# Patient Record
Sex: FEMALE | Race: WHITE | NOT HISPANIC OR LATINO | Employment: OTHER | URBAN - METROPOLITAN AREA
[De-identification: names, ages, dates, MRNs, and addresses within clinical notes are randomized per-mention and may not be internally consistent; named-entity substitution may affect disease eponyms.]

---

## 2017-09-29 ENCOUNTER — HISTORICAL (OUTPATIENT)
Dept: ADMINISTRATIVE | Facility: HOSPITAL | Age: 62
End: 2017-09-29

## 2018-02-19 ENCOUNTER — HISTORICAL (OUTPATIENT)
Dept: ADMINISTRATIVE | Facility: HOSPITAL | Age: 63
End: 2018-02-19

## 2018-04-09 ENCOUNTER — HISTORICAL (OUTPATIENT)
Dept: ADMINISTRATIVE | Facility: HOSPITAL | Age: 63
End: 2018-04-09

## 2018-10-08 ENCOUNTER — HISTORICAL (OUTPATIENT)
Dept: ADMINISTRATIVE | Facility: HOSPITAL | Age: 63
End: 2018-10-08

## 2019-05-01 ENCOUNTER — HISTORICAL (OUTPATIENT)
Dept: ADMINISTRATIVE | Facility: HOSPITAL | Age: 64
End: 2019-05-01

## 2019-05-06 ENCOUNTER — HISTORICAL (OUTPATIENT)
Dept: ADMINISTRATIVE | Facility: HOSPITAL | Age: 64
End: 2019-05-06

## 2019-08-05 LAB — BMD RECOMMENDATION EXT: NORMAL

## 2019-09-26 ENCOUNTER — HOSPITAL ENCOUNTER (OUTPATIENT)
Dept: MEDSURG UNIT | Facility: HOSPITAL | Age: 64
End: 2019-09-27
Attending: SURGERY | Admitting: SURGERY

## 2019-09-26 LAB
ABS NEUT (OLG): 11.84 X10(3)/MCL (ref 2.1–9.2)
ALBUMIN SERPL-MCNC: 3.6 GM/DL (ref 3.4–5)
ALBUMIN/GLOB SERPL: 1.3 RATIO (ref 1.1–2)
ALP SERPL-CCNC: 65 UNIT/L (ref 38–126)
ALT SERPL-CCNC: 15 UNIT/L (ref 12–78)
APPEARANCE, UA: CLEAR
AST SERPL-CCNC: 14 UNIT/L (ref 15–37)
BACTERIA SPEC CULT: NORMAL /HPF
BASOPHILS # BLD AUTO: 0.1 X10(3)/MCL (ref 0–0.2)
BASOPHILS NFR BLD AUTO: 0 %
BILIRUB SERPL-MCNC: 0.3 MG/DL (ref 0.2–1)
BILIRUB UR QL STRIP: NEGATIVE
BILIRUBIN DIRECT+TOT PNL SERPL-MCNC: 0.1 MG/DL (ref 0–0.5)
BILIRUBIN DIRECT+TOT PNL SERPL-MCNC: 0.2 MG/DL (ref 0–0.8)
BUN SERPL-MCNC: 18 MG/DL (ref 7–18)
CALCIUM SERPL-MCNC: 8.5 MG/DL (ref 8.5–10.1)
CHLORIDE SERPL-SCNC: 106 MMOL/L (ref 98–107)
CO2 SERPL-SCNC: 27 MMOL/L (ref 21–32)
COLOR UR: YELLOW
CREAT SERPL-MCNC: 0.89 MG/DL (ref 0.55–1.02)
EOSINOPHIL # BLD AUTO: 0 X10(3)/MCL (ref 0–0.9)
EOSINOPHIL NFR BLD AUTO: 0 %
ERYTHROCYTE [DISTWIDTH] IN BLOOD BY AUTOMATED COUNT: 12.9 % (ref 11.5–17)
GLOBULIN SER-MCNC: 2.7 GM/DL (ref 2.4–3.5)
GLUCOSE (UA): NEGATIVE
GLUCOSE SERPL-MCNC: 118 MG/DL (ref 74–106)
HCT VFR BLD AUTO: 41.1 % (ref 37–47)
HGB BLD-MCNC: 13.2 GM/DL (ref 12–16)
HGB UR QL STRIP: NEGATIVE
KETONES UR QL STRIP: NEGATIVE
LEUKOCYTE ESTERASE UR QL STRIP: NEGATIVE
LIPASE SERPL-CCNC: 113 UNIT/L (ref 73–393)
LYMPHOCYTES # BLD AUTO: 1 X10(3)/MCL (ref 0.6–4.6)
LYMPHOCYTES NFR BLD AUTO: 7 %
MCH RBC QN AUTO: 28.9 PG (ref 27–31)
MCHC RBC AUTO-ENTMCNC: 32.1 GM/DL (ref 33–36)
MCV RBC AUTO: 89.9 FL (ref 80–94)
MONOCYTES # BLD AUTO: 0.7 X10(3)/MCL (ref 0.1–1.3)
MONOCYTES NFR BLD AUTO: 5 %
NEUTROPHILS # BLD AUTO: 11.84 X10(3)/MCL (ref 2.1–9.2)
NEUTROPHILS NFR BLD AUTO: 87 %
NITRITE UR QL STRIP: NEGATIVE
PH UR STRIP: 6.5 [PH] (ref 5–9)
PLATELET # BLD AUTO: 189 X10(3)/MCL (ref 130–400)
PMV BLD AUTO: 9.4 FL (ref 9.4–12.4)
POTASSIUM SERPL-SCNC: 4.6 MMOL/L (ref 3.5–5.1)
PROT SERPL-MCNC: 6.3 GM/DL (ref 6.4–8.2)
PROT UR QL STRIP: NEGATIVE
RBC # BLD AUTO: 4.57 X10(6)/MCL (ref 4.2–5.4)
RBC #/AREA URNS HPF: NORMAL /[HPF]
SODIUM SERPL-SCNC: 142 MMOL/L (ref 136–145)
SP GR UR STRIP: 1.02 (ref 1–1.03)
SQUAMOUS EPITHELIAL, UA: NORMAL
TROPONIN I SERPL-MCNC: <0.02 NG/ML (ref 0.02–0.49)
UROBILINOGEN UR STRIP-ACNC: 1
WBC # SPEC AUTO: 13.7 X10(3)/MCL (ref 4.5–11.5)
WBC #/AREA URNS HPF: NORMAL /[HPF]

## 2019-10-07 ENCOUNTER — HISTORICAL (OUTPATIENT)
Dept: ADMINISTRATIVE | Facility: HOSPITAL | Age: 64
End: 2019-10-07

## 2019-10-07 LAB
ABS NEUT (OLG): 2.96 X10(3)/MCL (ref 2.1–9.2)
BASOPHILS # BLD AUTO: 0.1 X10(3)/MCL (ref 0–0.2)
BASOPHILS NFR BLD AUTO: 1 %
EOSINOPHIL # BLD AUTO: 0.2 X10(3)/MCL (ref 0–0.9)
EOSINOPHIL NFR BLD AUTO: 3 %
ERYTHROCYTE [DISTWIDTH] IN BLOOD BY AUTOMATED COUNT: 12.9 % (ref 11.5–17)
HCT VFR BLD AUTO: 41.5 % (ref 37–47)
HGB BLD-MCNC: 13.3 GM/DL (ref 12–16)
LYMPHOCYTES # BLD AUTO: 2 X10(3)/MCL (ref 0.6–4.6)
LYMPHOCYTES NFR BLD AUTO: 35 %
MCH RBC QN AUTO: 28.9 PG (ref 27–31)
MCHC RBC AUTO-ENTMCNC: 32 GM/DL (ref 33–36)
MCV RBC AUTO: 90.2 FL (ref 80–94)
MONOCYTES # BLD AUTO: 0.5 X10(3)/MCL (ref 0.1–1.3)
MONOCYTES NFR BLD AUTO: 9 %
NEUTROPHILS # BLD AUTO: 2.96 X10(3)/MCL (ref 2.1–9.2)
NEUTROPHILS NFR BLD AUTO: 52 %
PLATELET # BLD AUTO: 231 X10(3)/MCL (ref 130–400)
PMV BLD AUTO: 9.3 FL (ref 9.4–12.4)
RBC # BLD AUTO: 4.6 X10(6)/MCL (ref 4.2–5.4)
WBC # SPEC AUTO: 5.7 X10(3)/MCL (ref 4.5–11.5)

## 2019-12-17 ENCOUNTER — HISTORICAL (OUTPATIENT)
Dept: ADMINISTRATIVE | Facility: HOSPITAL | Age: 64
End: 2019-12-17

## 2019-12-18 ENCOUNTER — HISTORICAL (OUTPATIENT)
Dept: ADMINISTRATIVE | Facility: HOSPITAL | Age: 64
End: 2019-12-18

## 2020-03-03 ENCOUNTER — DOCUMENTATION ONLY (OUTPATIENT)
Dept: GYNECOLOGIC ONCOLOGY | Facility: CLINIC | Age: 65
End: 2020-03-03

## 2020-03-03 NOTE — PROGRESS NOTES
Referring physician: Roger Baker MD  Reason for referral: persistent LSIL vaginal pap    March 2014:  Total laparoscopic hysterectomy with bilateral salpingo oophorectomy.  Pathology showed cervix negative for dysplasia.  Inactive endometrium.  Normal tubes and ovaries.  Uterine fibroid.    August 2017:  Vaginal Pap:  LSI L. Positive high risk HPV.    February 2018:  Vaginal Pap:  ASCUS.  Negative high-risk HPV.    August 2018:  Vaginal Pap:  Normal.    September 2019:  Vaginal Pap:  LSIL.  Negative high-risk HPV.    February 2020:  Vaginal Pap:  LSIL.  Negative high-risk HPV.    Patient referred for further management of abnormal vaginal cytology.

## 2020-03-04 ENCOUNTER — TELEPHONE (OUTPATIENT)
Dept: ADMINISTRATIVE | Facility: OTHER | Age: 65
End: 2020-03-04

## 2020-03-04 RX ORDER — PREDNISONE 20 MG/1
TABLET ORAL
COMMUNITY
Start: 2019-12-23 | End: 2020-12-29

## 2020-03-04 RX ORDER — OSPEMIFENE 60 MG/1
TABLET, FILM COATED ORAL
COMMUNITY
Start: 2020-02-20 | End: 2020-12-29

## 2020-03-04 RX ORDER — CYANOCOBALAMIN 1000 UG/ML
INJECTION, SOLUTION INTRAMUSCULAR; SUBCUTANEOUS
COMMUNITY
Start: 2019-12-23 | End: 2020-12-29

## 2020-03-04 RX ORDER — VORTIOXETINE 20 MG/1
TABLET, FILM COATED ORAL
COMMUNITY
Start: 2020-02-16 | End: 2022-10-27

## 2020-03-04 RX ORDER — LUBIPROSTONE 24 UG/1
CAPSULE, GELATIN COATED ORAL
COMMUNITY
Start: 2020-02-27 | End: 2022-05-13

## 2020-03-04 RX ORDER — ZALEPLON 10 MG/1
CAPSULE ORAL
COMMUNITY
Start: 2019-12-18 | End: 2022-05-13

## 2020-03-04 RX ORDER — ESTROGENS, CONJUGATED 0.45 MG/1
TABLET, FILM COATED ORAL DAILY
COMMUNITY
Start: 2020-02-18

## 2020-03-04 RX ORDER — ESOMEPRAZOLE MAGNESIUM 40 MG/1
CAPSULE, DELAYED RELEASE ORAL
COMMUNITY
Start: 2020-02-16 | End: 2022-10-27

## 2020-03-04 RX ORDER — LEVOTHYROXINE SODIUM 50 UG/1
TABLET ORAL
COMMUNITY
Start: 2019-12-16 | End: 2022-08-09

## 2020-03-04 RX ORDER — TIZANIDINE 4 MG/1
TABLET ORAL
COMMUNITY
Start: 2019-12-23 | End: 2020-12-29

## 2020-03-04 RX ORDER — BENZONATATE 200 MG/1
CAPSULE ORAL
COMMUNITY
Start: 2019-12-23 | End: 2020-12-29

## 2020-03-04 RX ORDER — AZITHROMYCIN 250 MG/1
TABLET, FILM COATED ORAL
COMMUNITY
Start: 2019-12-18 | End: 2020-12-29

## 2020-03-04 RX ORDER — ALBUTEROL SULFATE 90 UG/1
AEROSOL, METERED RESPIRATORY (INHALATION)
COMMUNITY
Start: 2019-12-13 | End: 2020-12-29

## 2020-03-11 ENCOUNTER — TELEPHONE (OUTPATIENT)
Dept: ADMINISTRATIVE | Facility: OTHER | Age: 65
End: 2020-03-11

## 2020-03-12 ENCOUNTER — INITIAL CONSULT (OUTPATIENT)
Dept: GYNECOLOGIC ONCOLOGY | Facility: CLINIC | Age: 65
End: 2020-03-12
Payer: COMMERCIAL

## 2020-03-12 VITALS
WEIGHT: 192.88 LBS | DIASTOLIC BLOOD PRESSURE: 65 MMHG | BODY MASS INDEX: 32.93 KG/M2 | SYSTOLIC BLOOD PRESSURE: 136 MMHG | HEIGHT: 64 IN | HEART RATE: 75 BPM

## 2020-03-12 DIAGNOSIS — R87.622 LOW GRADE SQUAMOUS INTRAEPITH LESION ON CYTOLOGIC SMEAR VAGINA (LGSIL): ICD-10-CM

## 2020-03-12 DIAGNOSIS — N95.2 VAGINAL ATROPHY: ICD-10-CM

## 2020-03-12 PROCEDURE — 57420 COLPOSCOPY: ICD-10-PCS | Mod: S$GLB,,, | Performed by: OBSTETRICS & GYNECOLOGY

## 2020-03-12 PROCEDURE — 3008F PR BODY MASS INDEX (BMI) DOCUMENTED: ICD-10-PCS | Mod: CPTII,S$GLB,, | Performed by: OBSTETRICS & GYNECOLOGY

## 2020-03-12 PROCEDURE — 99204 OFFICE O/P NEW MOD 45 MIN: CPT | Mod: S$GLB,,, | Performed by: OBSTETRICS & GYNECOLOGY

## 2020-03-12 PROCEDURE — 99999 PR PBB SHADOW E&M-EST. PATIENT-LVL III: ICD-10-PCS | Mod: PBBFAC,,, | Performed by: OBSTETRICS & GYNECOLOGY

## 2020-03-12 PROCEDURE — 57420 EXAM OF VAGINA W/SCOPE: CPT | Mod: S$GLB,,, | Performed by: OBSTETRICS & GYNECOLOGY

## 2020-03-12 PROCEDURE — 3008F BODY MASS INDEX DOCD: CPT | Mod: CPTII,S$GLB,, | Performed by: OBSTETRICS & GYNECOLOGY

## 2020-03-12 PROCEDURE — 99999 PR PBB SHADOW E&M-EST. PATIENT-LVL III: CPT | Mod: PBBFAC,,, | Performed by: OBSTETRICS & GYNECOLOGY

## 2020-03-12 PROCEDURE — 99204 PR OFFICE/OUTPT VISIT, NEW, LEVL IV, 45-59 MIN: ICD-10-PCS | Mod: S$GLB,,, | Performed by: OBSTETRICS & GYNECOLOGY

## 2020-03-12 RX ORDER — ESTRADIOL 10 UG/1
10 INSERT VAGINAL
Qty: 24 TABLET | Refills: 3 | Status: SHIPPED | OUTPATIENT
Start: 2020-03-12 | End: 2020-12-29 | Stop reason: SDUPTHER

## 2020-03-12 RX ORDER — ACETAMINOPHEN 500 MG
5000 TABLET ORAL DAILY
COMMUNITY
End: 2023-09-18

## 2020-03-12 RX ORDER — NAPROXEN SODIUM 220 MG/1
81 TABLET, FILM COATED ORAL DAILY
COMMUNITY
End: 2023-09-18

## 2020-03-12 NOTE — LETTER
March 12, 2020      Roger Baker MD  [address] [place] [address]  [place] [address]           [facility] - GYN Oncology  [address]  [place] [address]  Phone: [phone]          Patient: Beronica Olguin   MR Number: [id number]   YOB: 1955   Date of Visit: 3/12/2020       Dear Dr. Roger Baker:    Thank you for referring Beronica Olguin to me for evaluation. Attached you will find relevant portions of my assessment and plan of care.    If you have questions, please do not hesitate to call me. I look forward to following Beronica Olguin along with you.    Sincerely,    Eris Garcia MD    Enclosure  CC:  No Recipients    If you would like to receive this communication electronically, please contact [email or web address] or [phone] to request more information on [facility] [employer] [facility] access.    For providers and/or their staff who would like to refer a patient to [facility], please contact us through our one-stop-shop provider referral line, [facility] , at [phone].    If you feel you have received this communication in error or would no longer like to receive these types of communications, please e-mail [email or web address]

## 2020-03-12 NOTE — PROGRESS NOTES
"Subjective:       Patient ID: Beronica Olguin is a 64 y.o. female.    Chief Complaint: Advice Only and Abnormal Pap Smear    HPI     Referring physician: Roger Baker MD  Reason for referral: persistent LSIL vaginal pap     March 2014:  Total laparoscopic hysterectomy with bilateral salpingo oophorectomy.  Pathology showed cervix negative for dysplasia.  Inactive endometrium.  Normal tubes and ovaries.  Uterine fibroid.     August 2017:  Vaginal Pap:  LSIL. Positive high risk HPV. No treatment was prescribed. No colposcopy was done.      February 2018:  Vaginal Pap:  ASCUS.  Negative high-risk HPV.     August 2018:  Vaginal Pap:  Normal.     September 2019:  Vaginal Pap:  LSIL.  Negative high-risk HPV.     February 2020:  Vaginal Pap:  LSIL.  Negative high-risk HPV.     Patient referred for further management of abnormal vaginal cytology.    Not sexually active at present.   Denies vaginal pain or discomfort. Several years has a "bout" of where the vaginal felt irritated.     Review of Systems   Constitutional: Negative for chills, fatigue and fever.   Respiratory: Negative for cough, shortness of breath and wheezing.    Cardiovascular: Negative for chest pain, palpitations and leg swelling.   Gastrointestinal: Positive for constipation and diarrhea. Negative for abdominal pain, nausea and vomiting.        IBS   Genitourinary: Positive for urgency. Negative for difficulty urinating, dysuria, frequency, genital sores, hematuria, vaginal bleeding, vaginal discharge and vaginal pain.   Musculoskeletal: Positive for back pain.   Neurological: Negative for weakness.   Hematological: Negative for adenopathy. Does not bruise/bleed easily.   Psychiatric/Behavioral: The patient is nervous/anxious.        Objective:   /65   Pulse 75   Ht 5' 4" (1.626 m)   Wt 87.5 kg (192 lb 14.4 oz)   BMI 33.11 kg/m²      Physical Exam   Constitutional: She is oriented to person, place, and time. She appears well-developed and " well-nourished.   HENT:   Head: Normocephalic and atraumatic.   Eyes: No scleral icterus.   Neck: Neck supple. No tracheal deviation present. No thyroid mass and no thyromegaly present.   Cardiovascular: Normal rate and regular rhythm.   Pulmonary/Chest: Effort normal and breath sounds normal. She has no wheezes.   Abdominal: Soft. She exhibits no distension and no mass. There is no hepatosplenomegaly. There is no tenderness. There is no rebound and no guarding.   Genitourinary:   Genitourinary Comments: Bimanual exam:  Vulva: no lesions. Normal appearance  Urethra: Normal size and location. No lesions  Bladder: No masses or tenderness.  Vagina: normal mucosa. No lesion  Cervix: absent.   Uterus: absent.  Adnexa: no masses.  Rectovaginal: Not performed     Musculoskeletal: She exhibits no edema or tenderness.   Lymphadenopathy:     She has no cervical adenopathy.     She has no axillary adenopathy.        Right: No inguinal and no supraclavicular adenopathy present.        Left: No inguinal and no supraclavicular adenopathy present.   Neurological: She is alert and oriented to person, place, and time.   Skin: Skin is warm and dry.   Psychiatric: She has a normal mood and affect. Her behavior is normal. Judgment and thought content normal.       Assessment:       1. Low grade squamous intraepith lesion on cytologic smear vagina (lgsil)    2. Vaginal atrophy        Plan:   Low grade squamous intraepith lesion on cytologic smear vagina (lgsil)  -     estradioL (VAGIFEM) 10 mcg Tab; Place 1 tablet (10 mcg total) vaginally twice a week.  Dispense: 24 tablet; Refill: 3  -     Colposcopy    Vaginal atrophy  -     estradioL (VAGIFEM) 10 mcg Tab; Place 1 tablet (10 mcg total) vaginally twice a week.  Dispense: 24 tablet; Refill: 3      I discussed the patient that I believe the abnormal vaginal Pap is due to atrophic changes.  I reassured her that I did not think  there was a serious problem in terms of dysplasia or cancer  of the vagina.  This helped to relieve her anxiety.    Plan will be for 3 months of vaginal estrogen and then she will return to see me for a follow-up Pap.  If that Pap is normal then she can return to Dr. Baker

## 2020-03-18 NOTE — PROCEDURES
Colposcopy  Date/Time: 3/12/2020 2:00 PM  Performed by: Eris Garcia MD  Authorized by: Eris Garcia MD     Consent Done?:  Yes (Written)    Colposcopy Site:  Vagina  Position:  Supine  Acrowhite Lesion: No    Atypical Vessels: No    Biopsy?: No     Patient tolerated the procedure well with no immediate complications.   Post-operative instructions were provided for the patient.   Patient was discharged and will follow up if any complications occur     Atrophic changes were present in the vagina.  No lesions were seen.

## 2020-06-24 ENCOUNTER — OFFICE VISIT (OUTPATIENT)
Dept: GYNECOLOGIC ONCOLOGY | Facility: CLINIC | Age: 65
End: 2020-06-24
Payer: COMMERCIAL

## 2020-06-24 VITALS
HEART RATE: 76 BPM | SYSTOLIC BLOOD PRESSURE: 134 MMHG | DIASTOLIC BLOOD PRESSURE: 63 MMHG | WEIGHT: 192.25 LBS | BODY MASS INDEX: 33 KG/M2

## 2020-06-24 DIAGNOSIS — B00.9 HERPES: ICD-10-CM

## 2020-06-24 DIAGNOSIS — R87.622 LOW GRADE SQUAMOUS INTRAEPITH LESION ON CYTOLOGIC SMEAR VAGINA (LGSIL): Primary | ICD-10-CM

## 2020-06-24 PROCEDURE — 99213 PR OFFICE/OUTPT VISIT, EST, LEVL III, 20-29 MIN: ICD-10-PCS | Mod: S$GLB,,, | Performed by: OBSTETRICS & GYNECOLOGY

## 2020-06-24 PROCEDURE — 99999 PR PBB SHADOW E&M-EST. PATIENT-LVL III: CPT | Mod: PBBFAC,,, | Performed by: OBSTETRICS & GYNECOLOGY

## 2020-06-24 PROCEDURE — 88141 PR  CYTOPATH CERV/VAG INTERPRET: ICD-10-PCS | Mod: ,,, | Performed by: PATHOLOGY

## 2020-06-24 PROCEDURE — 88141 CYTOPATH C/V INTERPRET: CPT | Mod: ,,, | Performed by: PATHOLOGY

## 2020-06-24 PROCEDURE — 99213 OFFICE O/P EST LOW 20 MIN: CPT | Mod: S$GLB,,, | Performed by: OBSTETRICS & GYNECOLOGY

## 2020-06-24 PROCEDURE — 3008F PR BODY MASS INDEX (BMI) DOCUMENTED: ICD-10-PCS | Mod: CPTII,S$GLB,, | Performed by: OBSTETRICS & GYNECOLOGY

## 2020-06-24 PROCEDURE — 99999 PR PBB SHADOW E&M-EST. PATIENT-LVL III: ICD-10-PCS | Mod: PBBFAC,,, | Performed by: OBSTETRICS & GYNECOLOGY

## 2020-06-24 PROCEDURE — 88175 CYTOPATH C/V AUTO FLUID REDO: CPT | Performed by: PATHOLOGY

## 2020-06-24 PROCEDURE — 3008F BODY MASS INDEX DOCD: CPT | Mod: CPTII,S$GLB,, | Performed by: OBSTETRICS & GYNECOLOGY

## 2020-06-24 RX ORDER — VALACYCLOVIR HYDROCHLORIDE 500 MG/1
500 TABLET, FILM COATED ORAL 2 TIMES DAILY
Qty: 14 TABLET | Refills: 2 | Status: SHIPPED | OUTPATIENT
Start: 2020-06-24 | End: 2020-12-29

## 2020-06-24 NOTE — PROGRESS NOTES
Subjective:       Patient ID: Beronica Olguin is a 64 y.o. female.    Chief Complaint: Follow-up (abn vaginal pap)    HPI     Patient comes in today for follow-up Pap for abnormal vaginal cytology is detailed below.  When seen by me in March 2020 and she was placed on vaginal estrogen. Today she reports a painful lesion on her buttocks that comes and goes over the years.       Her oncologic history is:  Referring physician: Roger Baker MD  Reason for referral: persistent LSIL vaginal pap     March 2014:  Total laparoscopic hysterectomy with bilateral salpingo oophorectomy.  Pathology showed cervix negative for dysplasia.  Inactive endometrium.  Normal tubes and ovaries.  Uterine fibroid.     August 2017:  Vaginal Pap:  LSIL. Positive high risk HPV. No treatment was prescribed. No colposcopy was done.      February 2018:  Vaginal Pap:  ASCUS.  Negative high-risk HPV.     August 2018:  Vaginal Pap:  Normal.     September 2019:  Vaginal Pap:  LSIL.  Negative high-risk HPV.     February 2020:  Vaginal Pap:  LSIL.  Negative high-risk HPV.    March 2020 I performed colposcopy: normal. No biopsy. Started on Vagifem.     Review of Systems   Constitutional: Negative for appetite change, chills, diaphoresis, fatigue, fever and unexpected weight change.   Respiratory: Negative for cough, chest tightness, shortness of breath and wheezing.    Cardiovascular: Negative for chest pain, palpitations and leg swelling.   Gastrointestinal: Negative for abdominal distention, abdominal pain, blood in stool, constipation, diarrhea, nausea and vomiting.   Genitourinary: Positive for frequency. Negative for difficulty urinating, dysuria, flank pain, hematuria, menstrual problem, pelvic pain, vaginal bleeding, vaginal discharge and vaginal pain.   Musculoskeletal: Negative for arthralgias and back pain.   Skin: Positive for rash (L buttock). Negative for color change.   Neurological: Negative for dizziness, weakness, numbness and  headaches.   Hematological: Negative for adenopathy.   Psychiatric/Behavioral: Negative for confusion and sleep disturbance. The patient is not nervous/anxious.        Objective:   /63   Pulse 76   Wt 87.2 kg (192 lb 3.9 oz)   BMI 33.00 kg/m²      Physical Exam  Vitals signs and nursing note reviewed.   Constitutional:       General: She is not in acute distress.     Appearance: Normal appearance. She is well-developed. She is not diaphoretic.   HENT:      Head: Normocephalic and atraumatic.   Eyes:      General: No scleral icterus.  Neck:      Musculoskeletal: Normal range of motion.   Pulmonary:      Effort: Pulmonary effort is normal. No respiratory distress.   Abdominal:      General: There is no distension.      Palpations: Abdomen is soft.   Genitourinary:     Comments: Bimanual exam:  Vulva: no lesions. Normal appearance  Urethra: Normal size and location. No lesions  Bladder: No masses or tenderness.  Vagina: normal mucosa. No lesion. Pap taken  Cervix: absent.   Uterus: absent.  Remainder of exam deferred.  Musculoskeletal: Normal range of motion.      Right lower leg: No edema.      Left lower leg: No edema.   Skin:     General: Skin is warm and dry.      Findings: No rash.             Comments: 2cm erythemic area to upper L buttock with multiple crusted lesions, tender to palpation   Neurological:      Mental Status: She is alert and oriented to person, place, and time.   Psychiatric:         Mood and Affect: Mood normal.         Behavior: Behavior normal.         Assessment:       1. Low grade squamous intraepith lesion on cytologic smear vagina (lgsil)    2. Herpes        Plan:   Low grade squamous intraepith lesion on cytologic smear vagina (lgsil)  -     Liquid-Based Pap Smear, Diagnostic    Herpes  -     valACYclovir (VALTREX) 500 MG tablet; Take 1 tablet (500 mg total) by mouth 2 (two) times daily.  Dispense: 14 tablet; Refill: 2    SHREYA on today's exam  Doing well with Vagifem and reports  improvement with symptoms/dryness  Valtrex rx as above with refills. Advised daily suppression therapy for multiple recurrences.  F/u pap smear  If normal, may resume [facility] care with Dr. Baker

## 2020-07-02 LAB
FINAL PATHOLOGIC DIAGNOSIS: ABNORMAL
Lab: ABNORMAL

## 2020-10-19 ENCOUNTER — HISTORICAL (OUTPATIENT)
Dept: ADMINISTRATIVE | Facility: HOSPITAL | Age: 65
End: 2020-10-19

## 2020-10-19 LAB
ABS NEUT (OLG): 2.8 X10(3)/MCL (ref 2.1–9.2)
ALBUMIN SERPL-MCNC: 3.9 GM/DL (ref 3.4–5)
ALBUMIN/GLOB SERPL: 1.6 RATIO (ref 1.1–2)
ALP SERPL-CCNC: 62 UNIT/L (ref 40–150)
ALT SERPL-CCNC: 10 UNIT/L (ref 0–55)
APPEARANCE, UA: CLEAR
AST SERPL-CCNC: 14 UNIT/L (ref 5–34)
BACTERIA SPEC CULT: NORMAL /HPF
BASOPHILS # BLD AUTO: 0.1 X10(3)/MCL (ref 0–0.2)
BASOPHILS NFR BLD AUTO: 1 %
BILIRUB SERPL-MCNC: 0.6 MG/DL
BILIRUB UR QL STRIP: NEGATIVE
BILIRUBIN DIRECT+TOT PNL SERPL-MCNC: 0.2 MG/DL (ref 0–0.5)
BILIRUBIN DIRECT+TOT PNL SERPL-MCNC: 0.4 MG/DL (ref 0–0.8)
BUN SERPL-MCNC: 17.4 MG/DL (ref 9.8–20.1)
CALCIUM SERPL-MCNC: 8.8 MG/DL (ref 8.4–10.2)
CHLORIDE SERPL-SCNC: 103 MMOL/L (ref 98–107)
CHOLEST SERPL-MCNC: 258 MG/DL
CHOLEST/HDLC SERPL: 3 {RATIO} (ref 0–5)
CO2 SERPL-SCNC: 30 MMOL/L (ref 23–31)
COLOR UR: YELLOW
CREAT SERPL-MCNC: 0.8 MG/DL (ref 0.55–1.02)
EOSINOPHIL # BLD AUTO: 0.3 X10(3)/MCL (ref 0–0.9)
EOSINOPHIL NFR BLD AUTO: 5 %
ERYTHROCYTE [DISTWIDTH] IN BLOOD BY AUTOMATED COUNT: 12.7 % (ref 11.5–17)
GLOBULIN SER-MCNC: 2.5 GM/DL (ref 2.4–3.5)
GLUCOSE (UA): NEGATIVE
GLUCOSE SERPL-MCNC: 102 MG/DL (ref 82–115)
HCT VFR BLD AUTO: 43.8 % (ref 37–47)
HDLC SERPL-MCNC: 74 MG/DL (ref 35–60)
HGB BLD-MCNC: 14 GM/DL (ref 12–16)
HGB UR QL STRIP: NEGATIVE
KETONES UR QL STRIP: NEGATIVE
LDLC SERPL CALC-MCNC: 165 MG/DL (ref 50–140)
LEUKOCYTE ESTERASE UR QL STRIP: NEGATIVE
LYMPHOCYTES # BLD AUTO: 1.9 X10(3)/MCL (ref 0.6–4.6)
LYMPHOCYTES NFR BLD AUTO: 35 %
MCH RBC QN AUTO: 28.7 PG (ref 27–31)
MCHC RBC AUTO-ENTMCNC: 32 GM/DL (ref 33–36)
MCV RBC AUTO: 89.9 FL (ref 80–94)
MONOCYTES # BLD AUTO: 0.4 X10(3)/MCL (ref 0.1–1.3)
MONOCYTES NFR BLD AUTO: 8 %
NEUTROPHILS # BLD AUTO: 2.8 X10(3)/MCL (ref 2.1–9.2)
NEUTROPHILS NFR BLD AUTO: 52 %
NITRITE UR QL STRIP: NEGATIVE
PH UR STRIP: 7 [PH] (ref 5–9)
PLATELET # BLD AUTO: 216 X10(3)/MCL (ref 130–400)
PMV BLD AUTO: 9.9 FL (ref 9.4–12.4)
POTASSIUM SERPL-SCNC: 4.8 MMOL/L (ref 3.5–5.1)
PROT SERPL-MCNC: 6.4 GM/DL (ref 5.8–7.6)
PROT UR QL STRIP: NEGATIVE
RBC # BLD AUTO: 4.87 X10(6)/MCL (ref 4.2–5.4)
RBC #/AREA URNS HPF: NORMAL /[HPF]
SODIUM SERPL-SCNC: 138 MMOL/L (ref 136–145)
SP GR UR STRIP: 1.01 (ref 1–1.03)
SQUAMOUS EPITHELIAL, UA: NORMAL
TRIGL SERPL-MCNC: 97 MG/DL (ref 37–140)
TSH SERPL-ACNC: 1.66 UIU/ML (ref 0.35–4.94)
UROBILINOGEN UR STRIP-ACNC: 0.2
VLDLC SERPL CALC-MCNC: 19 MG/DL
WBC # SPEC AUTO: 5.4 X10(3)/MCL (ref 4.5–11.5)
WBC #/AREA URNS HPF: NORMAL /[HPF]

## 2020-11-03 ENCOUNTER — HISTORICAL (OUTPATIENT)
Dept: ADMINISTRATIVE | Facility: HOSPITAL | Age: 65
End: 2020-11-03

## 2020-12-28 NOTE — PROGRESS NOTES
Subjective:       Patient ID: Beronica Olguin is a 65 y.o. female.    Chief Complaint: Follow-up    HPI     Patient comes in today for follow-up for abnormal vaginal cytology as detailed below.  Using vaginal estrogen.   Pap in June 2020 after vaginal estrogen use was LSIL.      Pap: 6/24/2020: LSIL   Her oncologic history is:  Referring physician: Roger Baker MD  Reason for referral: persistent LSIL vaginal pap     March 2014:  Total laparoscopic hysterectomy with bilateral salpingo oophorectomy.  Pathology showed cervix negative for dysplasia.  Inactive endometrium.  Normal tubes and ovaries.  Uterine fibroid.     August 2017:  Vaginal Pap:  LSIL. Positive high risk HPV. No treatment was prescribed. No colposcopy was done.      February 2018:  Vaginal Pap:  ASCUS.  Negative high-risk HPV.     August 2018:  Vaginal Pap:  Normal.     September 2019:  Vaginal Pap:  LSIL.  Negative high-risk HPV.     February 2020:  Vaginal Pap:  LSIL.  Negative high-risk HPV.     March 2020 I performed colposcopy: normal. No biopsy. Started on Vagifem.     Review of Systems   Constitutional: Negative for chills, fatigue and fever.   Respiratory: Negative for cough, shortness of breath and wheezing.    Cardiovascular: Negative for chest pain, palpitations and leg swelling.   Gastrointestinal: Negative for abdominal pain, constipation, diarrhea, nausea and vomiting.   Genitourinary: Negative for difficulty urinating, dysuria, frequency, genital sores, hematuria, urgency, vaginal bleeding, vaginal discharge and vaginal pain.   Neurological: Negative for weakness.   Hematological: Negative for adenopathy. Does not bruise/bleed easily.   Psychiatric/Behavioral: The patient is not nervous/anxious.        Objective:   /71   Pulse 73   Wt 82.5 kg (181 lb 14.1 oz)   BMI 31.22 kg/m²      Physical Exam  Constitutional:       Appearance: Normal appearance. She is well-developed.   HENT:      Head: Normocephalic and atraumatic.    Eyes:      General: No scleral icterus.  Neck:      Musculoskeletal: Neck supple.      Thyroid: No thyroid mass or thyromegaly.      Trachea: No tracheal deviation.   Cardiovascular:      Rate and Rhythm: Normal rate and regular rhythm.   Pulmonary:      Effort: Pulmonary effort is normal.      Breath sounds: Normal breath sounds. No wheezing.   Abdominal:      General: There is no distension.      Palpations: Abdomen is soft. There is no mass.      Tenderness: There is no abdominal tenderness. There is no guarding or rebound.   Genitourinary:     Comments: Vulva: no lesions. Normal appearance  Urethra: Normal size and location. No lesions  Bladder: No masses or tenderness.  Vagina: normal mucosa. No lesion.   Cervix: absent.   Uterus: absent.  Bimanual: no masses.   Musculoskeletal:         General: No tenderness.   Lymphadenopathy:      Cervical: No cervical adenopathy.      Upper Body:      Right upper body: No supraclavicular adenopathy.      Left upper body: No supraclavicular adenopathy.   Skin:     General: Skin is warm and dry.   Neurological:      Mental Status: She is alert and oriented to person, place, and time.   Psychiatric:         Behavior: Behavior normal.         Thought Content: Thought content normal.         Judgment: Judgment normal.         Assessment:       1. Low grade squamous intraepith lesion on cytologic smear vagina (lgsil)    2. Vaginal atrophy        Plan:   Low grade squamous intraepith lesion on cytologic smear vagina (lgsil)  RTC in 6 months for pap  If normal, then can follow up with Dr. Baker in [place]  Vaginal atrophy  Continue vaginal estrogen  Vagina is healthier appearing.   Refill of vagifem

## 2020-12-29 ENCOUNTER — OFFICE VISIT (OUTPATIENT)
Dept: GYNECOLOGIC ONCOLOGY | Facility: CLINIC | Age: 65
End: 2020-12-29
Payer: COMMERCIAL

## 2020-12-29 VITALS
WEIGHT: 181.88 LBS | HEART RATE: 73 BPM | SYSTOLIC BLOOD PRESSURE: 114 MMHG | BODY MASS INDEX: 31.22 KG/M2 | DIASTOLIC BLOOD PRESSURE: 71 MMHG

## 2020-12-29 DIAGNOSIS — N95.2 VAGINAL ATROPHY: ICD-10-CM

## 2020-12-29 DIAGNOSIS — R87.622 LOW GRADE SQUAMOUS INTRAEPITH LESION ON CYTOLOGIC SMEAR VAGINA (LGSIL): Primary | ICD-10-CM

## 2020-12-29 PROCEDURE — 3008F PR BODY MASS INDEX (BMI) DOCUMENTED: ICD-10-PCS | Mod: CPTII,S$GLB,, | Performed by: OBSTETRICS & GYNECOLOGY

## 2020-12-29 PROCEDURE — 99213 OFFICE O/P EST LOW 20 MIN: CPT | Mod: S$GLB,,, | Performed by: OBSTETRICS & GYNECOLOGY

## 2020-12-29 PROCEDURE — 3008F BODY MASS INDEX DOCD: CPT | Mod: CPTII,S$GLB,, | Performed by: OBSTETRICS & GYNECOLOGY

## 2020-12-29 PROCEDURE — 99999 PR PBB SHADOW E&M-EST. PATIENT-LVL III: ICD-10-PCS | Mod: PBBFAC,,, | Performed by: OBSTETRICS & GYNECOLOGY

## 2020-12-29 PROCEDURE — 1126F PR PAIN SEVERITY QUANTIFIED, NO PAIN PRESENT: ICD-10-PCS | Mod: S$GLB,,, | Performed by: OBSTETRICS & GYNECOLOGY

## 2020-12-29 PROCEDURE — 99999 PR PBB SHADOW E&M-EST. PATIENT-LVL III: CPT | Mod: PBBFAC,,, | Performed by: OBSTETRICS & GYNECOLOGY

## 2020-12-29 PROCEDURE — 99213 PR OFFICE/OUTPT VISIT, EST, LEVL III, 20-29 MIN: ICD-10-PCS | Mod: S$GLB,,, | Performed by: OBSTETRICS & GYNECOLOGY

## 2020-12-29 PROCEDURE — 1126F AMNT PAIN NOTED NONE PRSNT: CPT | Mod: S$GLB,,, | Performed by: OBSTETRICS & GYNECOLOGY

## 2020-12-29 RX ORDER — ESTRADIOL 10 UG/1
10 INSERT VAGINAL
Qty: 24 TABLET | Refills: 3 | Status: SHIPPED | OUTPATIENT
Start: 2020-12-31 | End: 2021-08-03 | Stop reason: SDUPTHER

## 2021-02-24 ENCOUNTER — HISTORICAL (OUTPATIENT)
Dept: ADMINISTRATIVE | Facility: HOSPITAL | Age: 66
End: 2021-02-24

## 2021-03-03 ENCOUNTER — HISTORICAL (OUTPATIENT)
Dept: RESPIRATORY THERAPY | Facility: HOSPITAL | Age: 66
End: 2021-03-03

## 2021-05-13 ENCOUNTER — HISTORICAL (OUTPATIENT)
Dept: ADMINISTRATIVE | Facility: HOSPITAL | Age: 66
End: 2021-05-13

## 2021-05-13 LAB
BUN SERPL-MCNC: 13.3 MG/DL (ref 9.8–20.1)
CALCIUM SERPL-MCNC: 9.1 MG/DL (ref 8.4–10.2)
CHLORIDE SERPL-SCNC: 104 MMOL/L (ref 98–107)
CO2 SERPL-SCNC: 31 MMOL/L (ref 23–31)
CREAT SERPL-MCNC: 0.84 MG/DL (ref 0.55–1.02)
CREAT/UREA NIT SERPL: 16
GLUCOSE SERPL-MCNC: 93 MG/DL (ref 82–115)
POTASSIUM SERPL-SCNC: 4.7 MMOL/L (ref 3.5–5.1)
SODIUM SERPL-SCNC: 143 MMOL/L (ref 136–145)
TSH SERPL-ACNC: 2.31 UIU/ML (ref 0.35–4.94)

## 2021-06-28 ENCOUNTER — HISTORICAL (OUTPATIENT)
Dept: ADMINISTRATIVE | Facility: HOSPITAL | Age: 66
End: 2021-06-28

## 2021-07-26 ENCOUNTER — HISTORICAL (OUTPATIENT)
Dept: RADIOLOGY | Facility: HOSPITAL | Age: 66
End: 2021-07-26

## 2021-08-03 ENCOUNTER — OFFICE VISIT (OUTPATIENT)
Dept: GYNECOLOGIC ONCOLOGY | Facility: CLINIC | Age: 66
End: 2021-08-03
Payer: COMMERCIAL

## 2021-08-03 VITALS
WEIGHT: 186.06 LBS | DIASTOLIC BLOOD PRESSURE: 65 MMHG | BODY MASS INDEX: 31.94 KG/M2 | SYSTOLIC BLOOD PRESSURE: 122 MMHG | HEART RATE: 70 BPM

## 2021-08-03 DIAGNOSIS — R87.622 LOW GRADE SQUAMOUS INTRAEPITH LESION ON CYTOLOGIC SMEAR VAGINA (LGSIL): Primary | ICD-10-CM

## 2021-08-03 DIAGNOSIS — N95.2 VAGINAL ATROPHY: ICD-10-CM

## 2021-08-03 DIAGNOSIS — N90.5 VULVAR ATROPHY: ICD-10-CM

## 2021-08-03 PROCEDURE — 3288F FALL RISK ASSESSMENT DOCD: CPT | Mod: CPTII,S$GLB,, | Performed by: OBSTETRICS & GYNECOLOGY

## 2021-08-03 PROCEDURE — 3078F PR MOST RECENT DIASTOLIC BLOOD PRESSURE < 80 MM HG: ICD-10-PCS | Mod: CPTII,S$GLB,, | Performed by: OBSTETRICS & GYNECOLOGY

## 2021-08-03 PROCEDURE — 99213 OFFICE O/P EST LOW 20 MIN: CPT | Mod: S$GLB,,, | Performed by: OBSTETRICS & GYNECOLOGY

## 2021-08-03 PROCEDURE — 1160F PR REVIEW ALL MEDS BY PRESCRIBER/CLIN PHARMACIST DOCUMENTED: ICD-10-PCS | Mod: CPTII,S$GLB,, | Performed by: OBSTETRICS & GYNECOLOGY

## 2021-08-03 PROCEDURE — 3078F DIAST BP <80 MM HG: CPT | Mod: CPTII,S$GLB,, | Performed by: OBSTETRICS & GYNECOLOGY

## 2021-08-03 PROCEDURE — 3074F SYST BP LT 130 MM HG: CPT | Mod: CPTII,S$GLB,, | Performed by: OBSTETRICS & GYNECOLOGY

## 2021-08-03 PROCEDURE — 1126F PR PAIN SEVERITY QUANTIFIED, NO PAIN PRESENT: ICD-10-PCS | Mod: CPTII,S$GLB,, | Performed by: OBSTETRICS & GYNECOLOGY

## 2021-08-03 PROCEDURE — 1159F MED LIST DOCD IN RCRD: CPT | Mod: CPTII,S$GLB,, | Performed by: OBSTETRICS & GYNECOLOGY

## 2021-08-03 PROCEDURE — 1100F PR PT FALLS ASSESS DOC 2+ FALLS/FALL W/INJURY/YR: ICD-10-PCS | Mod: CPTII,S$GLB,, | Performed by: OBSTETRICS & GYNECOLOGY

## 2021-08-03 PROCEDURE — 99213 PR OFFICE/OUTPT VISIT, EST, LEVL III, 20-29 MIN: ICD-10-PCS | Mod: S$GLB,,, | Performed by: OBSTETRICS & GYNECOLOGY

## 2021-08-03 PROCEDURE — 3008F BODY MASS INDEX DOCD: CPT | Mod: CPTII,S$GLB,, | Performed by: OBSTETRICS & GYNECOLOGY

## 2021-08-03 PROCEDURE — 1160F RVW MEDS BY RX/DR IN RCRD: CPT | Mod: CPTII,S$GLB,, | Performed by: OBSTETRICS & GYNECOLOGY

## 2021-08-03 PROCEDURE — 99999 PR PBB SHADOW E&M-EST. PATIENT-LVL III: ICD-10-PCS | Mod: PBBFAC,,, | Performed by: OBSTETRICS & GYNECOLOGY

## 2021-08-03 PROCEDURE — 3074F PR MOST RECENT SYSTOLIC BLOOD PRESSURE < 130 MM HG: ICD-10-PCS | Mod: CPTII,S$GLB,, | Performed by: OBSTETRICS & GYNECOLOGY

## 2021-08-03 PROCEDURE — 3008F PR BODY MASS INDEX (BMI) DOCUMENTED: ICD-10-PCS | Mod: CPTII,S$GLB,, | Performed by: OBSTETRICS & GYNECOLOGY

## 2021-08-03 PROCEDURE — 1100F PTFALLS ASSESS-DOCD GE2>/YR: CPT | Mod: CPTII,S$GLB,, | Performed by: OBSTETRICS & GYNECOLOGY

## 2021-08-03 PROCEDURE — 1159F PR MEDICATION LIST DOCUMENTED IN MEDICAL RECORD: ICD-10-PCS | Mod: CPTII,S$GLB,, | Performed by: OBSTETRICS & GYNECOLOGY

## 2021-08-03 PROCEDURE — 3288F PR FALLS RISK ASSESSMENT DOCUMENTED: ICD-10-PCS | Mod: CPTII,S$GLB,, | Performed by: OBSTETRICS & GYNECOLOGY

## 2021-08-03 PROCEDURE — 88175 CYTOPATH C/V AUTO FLUID REDO: CPT | Performed by: OBSTETRICS & GYNECOLOGY

## 2021-08-03 PROCEDURE — 99999 PR PBB SHADOW E&M-EST. PATIENT-LVL III: CPT | Mod: PBBFAC,,, | Performed by: OBSTETRICS & GYNECOLOGY

## 2021-08-03 PROCEDURE — 1126F AMNT PAIN NOTED NONE PRSNT: CPT | Mod: CPTII,S$GLB,, | Performed by: OBSTETRICS & GYNECOLOGY

## 2021-08-03 RX ORDER — NAPROXEN SODIUM 220 MG/1
81 TABLET, FILM COATED ORAL
COMMUNITY
End: 2022-10-27

## 2021-08-03 RX ORDER — ESTRADIOL 10 UG/1
10 INSERT VAGINAL
Qty: 24 TABLET | Refills: 3 | Status: SHIPPED | OUTPATIENT
Start: 2021-08-05 | End: 2021-10-12 | Stop reason: SDUPTHER

## 2021-08-03 RX ORDER — CYCLOBENZAPRINE HCL 10 MG
10 TABLET ORAL 2 TIMES DAILY
COMMUNITY
Start: 2021-06-28 | End: 2022-05-13

## 2021-08-03 RX ORDER — ESOMEPRAZOLE MAGNESIUM 40 MG/1
40 CAPSULE, DELAYED RELEASE ORAL
COMMUNITY
Start: 2020-03-04 | End: 2022-10-27

## 2021-08-03 RX ORDER — VALACYCLOVIR HYDROCHLORIDE 500 MG/1
500 TABLET, FILM COATED ORAL 2 TIMES DAILY
COMMUNITY
Start: 2021-06-22 | End: 2022-05-13

## 2021-08-03 RX ORDER — METOPROLOL SUCCINATE 25 MG/1
TABLET, EXTENDED RELEASE ORAL
COMMUNITY
Start: 2021-05-19 | End: 2021-08-03

## 2021-08-03 RX ORDER — ESTRADIOL 0.1 MG/G
CREAM VAGINAL
Qty: 1 TUBE | Refills: 6 | Status: SHIPPED | OUTPATIENT
Start: 2021-08-03 | End: 2022-05-13

## 2021-08-09 LAB
CLINICAL INFO: ABNORMAL
CYTO CVX: ABNORMAL
CYTOLOGIST CVX/VAG CYTO: ABNORMAL
CYTOLOGIST CVX/VAG CYTO: ABNORMAL
CYTOLOGY CMNT CVX/VAG CYTO-IMP: ABNORMAL
CYTOLOGY PAP THIN PREP EXPLANATION: ABNORMAL
DATE OF PREVIOUS PAP: ABNORMAL
DATE PREVIOUS BX: NO
GEN CATEG CVX/VAG CYTO-IMP: ABNORMAL
LMP START DATE: ABNORMAL
MICROORGANISM CVX/VAG CYTO: ABNORMAL
PATHOLOGIST CVX/VAG CYTO: ABNORMAL
SERVICE CMNT-IMP: ABNORMAL
SPECIMEN SOURCE CVX/VAG CYTO: ABNORMAL
STAT OF ADQ CVX/VAG CYTO-IMP: ABNORMAL

## 2021-09-13 ENCOUNTER — TELEPHONE (OUTPATIENT)
Dept: GYNECOLOGIC ONCOLOGY | Facility: CLINIC | Age: 66
End: 2021-09-13

## 2021-09-21 ENCOUNTER — HISTORICAL (OUTPATIENT)
Dept: ADMINISTRATIVE | Facility: HOSPITAL | Age: 66
End: 2021-09-21

## 2021-09-21 ENCOUNTER — TELEPHONE (OUTPATIENT)
Dept: GYNECOLOGIC ONCOLOGY | Facility: CLINIC | Age: 66
End: 2021-09-21

## 2021-09-21 LAB — SARS-COV-2 RNA RESP QL NAA+PROBE: NEGATIVE

## 2021-09-23 ENCOUNTER — HISTORICAL (OUTPATIENT)
Dept: ADMINISTRATIVE | Facility: HOSPITAL | Age: 66
End: 2021-09-23

## 2021-09-23 LAB — SARS-COV-2 RNA RESP QL NAA+PROBE: NEGATIVE

## 2021-10-12 ENCOUNTER — OFFICE VISIT (OUTPATIENT)
Dept: GYNECOLOGIC ONCOLOGY | Facility: CLINIC | Age: 66
End: 2021-10-12
Payer: COMMERCIAL

## 2021-10-12 VITALS
HEART RATE: 69 BPM | SYSTOLIC BLOOD PRESSURE: 138 MMHG | WEIGHT: 183 LBS | DIASTOLIC BLOOD PRESSURE: 67 MMHG | BODY MASS INDEX: 31.41 KG/M2

## 2021-10-12 DIAGNOSIS — R87.622 LOW GRADE SQUAMOUS INTRAEPITH LESION ON CYTOLOGIC SMEAR VAGINA (LGSIL): ICD-10-CM

## 2021-10-12 DIAGNOSIS — N95.2 VAGINAL ATROPHY: ICD-10-CM

## 2021-10-12 PROCEDURE — 99213 OFFICE O/P EST LOW 20 MIN: CPT | Mod: PBBFAC,25 | Performed by: OBSTETRICS & GYNECOLOGY

## 2021-10-12 PROCEDURE — 99999 PR PBB SHADOW E&M-EST. PATIENT-LVL III: CPT | Mod: PBBFAC,,, | Performed by: OBSTETRICS & GYNECOLOGY

## 2021-10-12 PROCEDURE — 99214 PR OFFICE/OUTPT VISIT, EST, LEVL IV, 30-39 MIN: ICD-10-PCS | Mod: 25,S$GLB,, | Performed by: OBSTETRICS & GYNECOLOGY

## 2021-10-12 PROCEDURE — 57420 COLPOSCOPY: ICD-10-PCS | Mod: S$GLB,,, | Performed by: OBSTETRICS & GYNECOLOGY

## 2021-10-12 PROCEDURE — 57420 EXAM OF VAGINA W/SCOPE: CPT | Mod: PBBFAC | Performed by: OBSTETRICS & GYNECOLOGY

## 2021-10-12 PROCEDURE — 99214 OFFICE O/P EST MOD 30 MIN: CPT | Mod: 25,S$GLB,, | Performed by: OBSTETRICS & GYNECOLOGY

## 2021-10-12 PROCEDURE — 99999 PR PBB SHADOW E&M-EST. PATIENT-LVL III: ICD-10-PCS | Mod: PBBFAC,,, | Performed by: OBSTETRICS & GYNECOLOGY

## 2021-10-12 RX ORDER — IVERMECTIN 3 MG/1
15 TABLET ORAL DAILY
COMMUNITY
Start: 2021-09-24 | End: 2022-05-13

## 2021-10-12 RX ORDER — TRAZODONE HYDROCHLORIDE 50 MG/1
50 TABLET ORAL NIGHTLY
COMMUNITY
Start: 2021-09-24 | End: 2022-05-13

## 2021-10-12 RX ORDER — FLUOXETINE HYDROCHLORIDE 40 MG/1
40 CAPSULE ORAL
COMMUNITY
Start: 2021-09-21 | End: 2023-09-18

## 2021-10-12 RX ORDER — ESTRADIOL 10 UG/1
10 INSERT VAGINAL
Qty: 24 TABLET | Refills: 3 | Status: SHIPPED | OUTPATIENT
Start: 2021-10-14 | End: 2022-05-13

## 2021-12-08 ENCOUNTER — HISTORICAL (OUTPATIENT)
Dept: ADMINISTRATIVE | Facility: HOSPITAL | Age: 66
End: 2021-12-08

## 2021-12-09 LAB — RAPID GROUP A STREP (OHS): NEGATIVE

## 2022-03-29 ENCOUNTER — TELEPHONE (OUTPATIENT)
Dept: GYNECOLOGIC ONCOLOGY | Facility: CLINIC | Age: 67
End: 2022-03-29
Payer: MEDICARE

## 2022-03-29 NOTE — TELEPHONE ENCOUNTER
----- Message from Gloria Hinkle MA sent at 3/28/2022  9:53 AM CDT -----    ----- Message -----  From: Leann Cortes  Sent: 3/28/2022   9:38 AM CDT  To: Wilfr[facility] Pace Staff    Pt#[phone]    Pt is calling to reschedule her appt. She's going to be out of town

## 2022-04-10 ENCOUNTER — HISTORICAL (OUTPATIENT)
Dept: ADMINISTRATIVE | Facility: HOSPITAL | Age: 67
End: 2022-04-10
Payer: MEDICARE

## 2022-04-29 VITALS
SYSTOLIC BLOOD PRESSURE: 139 MMHG | WEIGHT: 182 LBS | SYSTOLIC BLOOD PRESSURE: 142 MMHG | OXYGEN SATURATION: 97 % | BODY MASS INDEX: 31.07 KG/M2 | OXYGEN SATURATION: 97 % | BODY MASS INDEX: 30.48 KG/M2 | HEIGHT: 64 IN | HEIGHT: 64 IN | WEIGHT: 180.75 LBS | DIASTOLIC BLOOD PRESSURE: 86 MMHG | DIASTOLIC BLOOD PRESSURE: 85 MMHG | WEIGHT: 178.56 LBS | BODY MASS INDEX: 30.86 KG/M2 | DIASTOLIC BLOOD PRESSURE: 68 MMHG | SYSTOLIC BLOOD PRESSURE: 115 MMHG | HEIGHT: 64 IN

## 2022-04-30 NOTE — H&P
Patient:   Beronica Olguin            MRN: [id number]            FIN: [id number]               Age:   64 years     Sex:  Female     :  1955   Associated Diagnoses:   None   Author:   George Pichardo      Basic Information   Admit information:  Abd pain .    Source of history:  Self, Spouse, Medical record.    Present at bedside:  Significant other.    Referral source:  Emergency department.    History limitation:  None.       History of Present Illness   64F presented to the ER with 4 days of abd pain. States that she had worsening pain over the last 12-24 horus with n/v and localization of pain to the RLQ. Had chills but did not check temperature. Presented to ER for evaluation and was found to have leukocytosis and appendicitis on CT .      Review of Systems   Constitutional:  Chills, Fatigue, Decreased activity.    Eye:  No recent visual problem.    Ear/Nose/Mouth/Throat:  Negative.    Respiratory:  No shortness of breath, No cough, No sputum production.    Cardiovascular:  No chest pain, No bradycardia, No syncope.    Gastrointestinal:  Nausea, Vomiting.         Abdominal pain: Right, Lower quadrant, The severity is moderate, Characterized as ( Continuous ).    Genitourinary:  Negative.    Immunologic:  Negative.    Musculoskeletal:  No neck pain, No joint pain, No muscle pain.    Integumentary:  Negative.    Neurologic:  Alert and oriented X4, No headache.    Psychiatric:  No anxiety, No depression.       Health Status   Allergies:    Allergies (3) Active Reaction  codeine Vomiting  Demerol HCl Vomiting  Ultram Vomiting     Current medications:    Medications (6) Active  Scheduled: (2)  enoxaparin 40mg/0.4ml Inj  40 mg 0.4 mL, Subcutaneous, Daily  piperacillin-tazobactam  3.375 gm 1 EA, IV Piggyback, q8hr  Continuous: (2)  sodium chloride 0.9% 1,000 mL  1,000 mL, IV, 1,000 mL/hr  sodium chloride 0.9% 1,000 mL  1,000 mL, IV, 125 mL/hr  PRN: (2)  morphine 4 mg/mL preservative-free Soln  2  mg 0.5 mL, IV Push, q4hr  ondansetron 2 mg/mL inj - 2mL  4 mg 2 mL, IV Push, q4hr     Problem list:    Active Problems (3)  Acid reflux   Hypercholesterolemia   Hypothyroidism         Histories   Past Medical History:    Active  Hypercholesterolemia (27354791)  Acid reflux (844628119)  Hypothyroidism (66985360)  Resolved  arrythmias (9077157029):  Resolved.  Endometrial hyperplasia (403473526):  Resolved.   Family History:    Father  Arthritis.  Mother  Hypertension.  Diabetes mellitus type 2  Cervix cancer.  Skin cancer.     Procedure history:    Hysterectomy Laparoscopic Robotic Assist (.) on 3/5/2014 at 58 Years.  Comments:  3/5/2014 12:55 CST - Edis HARRISON , Adelia RG  auto-populated from documented surgical case  Breast augmentation (3946540208) in  at 47 Years.  FESS - Functional endoscopic sinus surgery - posterior ethmoidectomy (271419615) in  at 45 Years.   section (31721697) in  at 28 Years.   section (51096899) in  at 27 Years.  Comments:  3/3/2014 9:51 ALBA - Odette Perez RN  x2  Tonsillectomy (930394240).   Social History        Social & Psychosocial Habits    Alcohol  2014 Risk Assessment: Low Risk      Comment: rarely - 2014 09:54 - Odette Perez RN    Substance Use  2014 Risk Assessment: Denies Substance Abuse    2019  Use: Never    Tobacco  2014 Risk Assessment: Denies Tobacco Use    2019  Use: Never (less than 100 in l    Patient Wants Consult For Cessation Counseling N/A  .        Physical Examination   General:  Alert and oriented, No acute distress.    Eye:  Pupils are equal, round and reactive to light, Extraocular movements are intact.    Neck:  Supple, Non-tender.    Respiratory:  Lungs are clear to auscultation, Respirations are non-labored, Breath sounds are equal, Symmetrical chest wall expansion.    Cardiovascular:  Normal rate, Regular rhythm, Good pulses equal in all extremities, Normal peripheral perfusion.     Gastrointestinal:  Soft, Non-distended, Normal bowel sounds, tender to RLQ.       Vital Signs (last 24 hrs)_____  Last Charted___________  Temp Oral     37.4 DegC  (SEP 26 12:31)  Heart Rate Peripheral   78 bpm  (SEP 26 12:31)  Resp Rate         16 br/min  (SEP 26 08:51)  SBP      102 mmHg  (SEP 26 12:31)  DBP      L 57mmHg  (SEP 26 12:31)  SpO2      96 %  (SEP 26 12:31)     Musculoskeletal:  Normal range of motion, Normal strength, No deformity.    Integumentary:  Warm, Dry.    Neurologic:  Alert, Oriented.    Psychiatric:  Cooperative, Appropriate mood & affect.       Review / Management   Labs Last 24 Hours   Chemistry  Hematology/Coagulation    Sodium Lvl: 142 mmol/L (09/26/19 09:01:00) WBC: 13.7 x10(3)/mcL High (09/26/19 09:01:00)   Potassium Lvl: 4.6 mmol/L (09/26/19 09:01:00) RBC: 4.57 x10(6)/mcL (09/26/19 09:01:00)   Chloride: 106 mmol/L (09/26/19 09:01:00) Hgb: 13.2 gm/dL (09/26/19 09:01:00)   CO2: 27 mmol/L (09/26/19 09:01:00) Hct: 41.1 % (09/26/19 09:01:00)   Calcium Lvl: 8.5 mg/dL (09/26/19 09:01:00) Platelet: 189 x10(3)/mcL (09/26/19 09:01:00)   Glucose Lvl: 118 mg/dL High (09/26/19 09:01:00) MCV: 89.9 fL (09/26/19 09:01:00)   BUN: 18 mg/dL (09/26/19 09:01:00) MCH: 28.9 pg (09/26/19 09:01:00)   Creatinine: 0.89 mg/dL (09/26/19 09:01:00) MCHC: 32.1 gm/dL Low (09/26/19 09:01:00)   eGFR-AA: >60 (09/26/19 09:01:00) RDW: 12.9 % (09/26/19 09:01:00)   eGFR-DOMENICO: >60 (09/26/19 09:01:00) MPV: 9.4 fL (09/26/19 09:01:00)   Bili Total: 0.3 mg/dL (09/26/19 09:01:00) Abs Neut: 11.84 x10(3)/mcL High (09/26/19 09:01:00)   Bili Direct: 0.1 mg/dL (09/26/19 09:01:00) Neutro Auto: 87 % (09/26/19 09:01:00)   Bili Indirect: 0.2 mg/dL (09/26/19 09:01:00) Lymph Auto: 7 % (09/26/19 09:01:00)   AST: 14 unit/L Low (09/26/19 09:01:00) Mono Auto: 5 % (09/26/19 09:01:00)   ALT: 15 unit/L (09/26/19 09:01:00) Eos Auto: 0 % (09/26/19 09:01:00)   Alk Phos: 65 unit/L (09/26/19 09:01:00) Abs Eos: 0 x10(3)/mcL (09/26/19 09:01:00)   Total  Protein: 6.3 gm/dL Low (09/26/19 09:01:00) Basophil Auto: 0 % (09/26/19 09:01:00)   Albumin Lvl: 3.6 gm/dL (09/26/19 09:01:00) Abs Neutro: 11.84 x10(3)/mcL High (09/26/19 09:01:00)   Globulin: 2.7 gm/dL (09/26/19 09:01:00) Abs Lymph: 1 x10(3)/mcL (09/26/19 09:01:00)   A/G Ratio: 1.3 ratio (09/26/19 09:01:00) Abs Mono: 0.7 x10(3)/mcL (09/26/19 09:01:00)   Lipase Lvl: 113 unit/L (09/26/19 09:01:00) Abs Baso: 0.1 x10(3)/mcL (09/26/19 09:01:00)   Troponin-I: <0.02 (09/26/19 09:01:00)      Accession: [id number]  Order: CT Abdomen and Pelvis W Contrast  Report Dt/Tm: 09/26/2019 11:39  Report:      History.  Lower abdominal pain.     Reference.  No prior CT images are available for comparison     Technique.  Helical acquisition through the abdomen and pelvis with IV contrast.  Three plane reconstructions were provided for review. DLP 1107 mGycm.  Automatic exposure control, adjustment of mA/kV or iterative  reconstruction technique was used to reduce radiation.     Findings.  There is some bibasilar atelectasis or scarring. Heart size upper  limit normal.     The liver, spleen, gallbladder, pancreas, adrenals and kidneys are  within normal limits.      No bowel obstruction. Some colonic wall thickening most conspicuous  around the splenic flexure is favored related to under distention. The  appendix is mildly dilated and fluid-filled with wall thickening and  mild periappendiceal fat stranding. No gross perforation or abscess.     Urinary bladder is unremarkable. The uterus is surgically absent.  There is no free fluid. The abdominal aorta is normal in caliber.  There are no enlarged retroperitoneal lymph nodes.      There are moderate degenerative changes of the spine.     Impression.  1. Findings compatible with early acute appendicitis. No gross  perforation or drainable abscess.  2. Areas of colonic wall thickening are favored related to under  distention but would recommend correlation with colonoscopy if  patient  is not up-to-date on screening     Critical Result: Appendicitis     Results were reported to [facility] in the ED at 1132 on 9/26/2019.           Impression and Plan   Acute appendicitis  Leukocytosis    To OR  Admit to floor  Zosyn  NPO  IVF

## 2022-04-30 NOTE — ED PROVIDER NOTES
Patient:   Beronica Olguin            MRN: [id number]            FIN: [id number]               Age:   64 years     Sex:  Female     :  1955   Associated Diagnoses:   Acute appendicitis   Author:   Carmita Escobar      Basic Information   Time seen: Date & time 2019 08:18:00.   History source: Patient.   Arrival mode: Private vehicle.   History limitation: None.   Additional information: Chief Complaint from Nursing Triage Note : Chief Complaint   2019 7:37 CDT       Chief Complaint           Patient c/o lower abd pain with N/V that started last night, she has seen Dr Farmer in the past  .      History of Present Illness   The patient presents with 63 y/o female who presents with diffuse abdominal pain but worse in RLQ with n/v x 12 hours. no known fever but does c/o chills. addie bailey.  The onset was 1  days ago.  The course/duration of symptoms is constant.  The character of symptoms is dull.  The Location of pain at onset was diffuse and abdominal.  The Location of pain at present is diffuse, abdominal and worse in rlq.  Radiating pain: none. The exacerbating factor is none.  The relieving factor is none.  Therapy today: none.  Associated symptoms: nausea and vomiting.        Review of Systems   Constitutional symptoms:  No fever, no chills, no weakness.    Skin symptoms:  No rash, no pruritus.    Respiratory symptoms:  No shortness of breath, no orthopnea, no cough.    Cardiovascular symptoms:  No chest pain, no palpitations, no peripheral edema.    Gastrointestinal symptoms:  Abdominal pain, moderate, diffuse, worse in RLQ, nausea, vomiting.    Neurologic symptoms:  No headache, no dizziness.              Additional review of systems information: All other systems reviewed and otherwise negative.      Health Status   Allergies:    Allergic Reactions (Selected)  Severity Not Documented  Codeine- Vomiting.  Demerol HCl- Vomiting.  Ultram- Nausea and vomiting.,    Allergies (3)  Active Reaction  codeine Vomiting  Demerol HCl Vomiting  Ultram Vomiting  .   Medications:  (Selected)   Documented Medications  Documented  Premarin 0.45 mg oral tablet: 0.45 mg = 1 tab(s), Oral, Daily, # 30 tab(s), 0 Refill(s)  Trintellix 20 mg oral tablet: 20 mg = 1 tab(s), Oral, Daily  aspirin 81 mg oral tablet: 81 mg = 1 tab(s), Oral, Daily, 0 Refill(s)  levothyroxine 50 mcg (0.05 mg) oral tablet: Oral, Daily, 0 Refill(s).      Past Medical/ Family/ Social History   Medical history:    Active  Hypercholesterolemia (31728183)  Acid reflux (836688256)  Hypothyroidism (22405161)  Resolved  arrythmias (3868896434):  Resolved.  Endometrial hyperplasia (977975975):  Resolved..   Surgical history:    Hysterectomy Laparoscopic Robotic Assist (.) performed by Samuel POWELL, Roger BENAVIDES on 3/5/2014 at 58 Years.  Comments:  3/5/2014 12:55 CST - Edis HARRISON , Adelia RG  auto-populated from documented surgical case  Breast augmentation (SNOMED CT 6723353089) in  at 47 Years.  FESS - Functional endoscopic sinus surgery - posterior ethmoidectomy (SNOMED CT 378968520) in  at 45 Years.   section (SNOMED CT 43241489) in  at 28 Years.   section (SNOMED CT 89480583) in  at 27 Years.  Comments:  3/3/2014 9:51 CST - Chris HARRISON , Odette NASSAR  x2  Tonsillectomy (SNOMED CT 677402052)..   Family history:    Diabetes mellitus type 2  Mother  Hypertension.  Mother  Cervix cancer.  Mother  Arthritis.  Father  Skin cancer.  Mother  .   Problem list:    Active Problems (3)  Acid reflux   Hypercholesterolemia   Hypothyroidism   .      Physical Examination               Vital Signs   Vital Signs   2019 7:37 CDT       Peripheral Pulse Rate     89 bpm                             Respiratory Rate          18 br/min                             SpO2                      98 %                             Oxygen Therapy            Room air                             Systolic Blood Pressure   122 mmHg                              Diastolic Blood Pressure  72 mmHg  .      Vital Signs (last 24 hrs)_____  Last Charted___________  Heart Rate Peripheral   89 bpm  (SEP 26 07:37)  Resp Rate         18 br/min  (SEP 26 07:37)  SBP      122 mmHg  (SEP 26 07:37)  DBP      72 mmHg  (SEP 26 07:37)  SpO2      98 %  (SEP 26 07:37)  .   Measurements   9/26/2019 7:37 CDT       Weight Dosing             81 kg                             Weight Measured and Calculated in Lbs     178.57 lb                             Weight Estimated          81 kg                             Height/Length Estimated   165 cm                             Body Mass Index Estimated 29.75 kg/m2  .   Basic Oxygen Information   9/26/2019 7:37 CDT       SpO2                      98 %                             Oxygen Therapy            Room air  .   General:  Alert, no acute distress.    Skin:  Warm, dry, pink.    Head:  Normocephalic, atraumatic.    Neck:  Supple, trachea midline, no tenderness.    Eye   Cardiovascular:  Regular rate and rhythm, No murmur, Normal peripheral perfusion.    Respiratory:  Lungs are clear to auscultation, respirations are non-labored, breath sounds are equal.    Gastrointestinal:  Soft, Non distended, Tenderness: Mild, generalized, worse upon RLQ palpation, Guarding: Minimal, right lower quadrant, Rebound: Negative, Bowel sounds: Normal, Signs: McBurney's negative.    Back:  Nontender, Normal range of motion.    Musculoskeletal:  Normal ROM, normal strength.    Neurological:  Alert and oriented to person, place, time, and situation, normal sensory observed, normal motor observed, normal speech observed, normal coordination observed.    Psychiatric:  Cooperative, appropriate mood & affect.       Medical Decision Making   Differential Diagnosis:  Abdominal pain, Appendicitis.    Documents reviewed:  Emergency department nurses' notes.   Orders  Launch Orders   Laboratory:  UA Total a reflex to culture (Order): Stat collect, Urine, 9/26/2019 8:18 CDT,  Nurse collect, Print Label By Order Location  Lipase Level (Order): Stat collect, 9/26/2019 8:18 CDT, Blood, Lab Collect, Print Label By Order Location, 9/26/2019 8:18 CDT  CMP (Order): Stat collect, 9/26/2019 8:18 CDT, Blood, Lab Collect, Print Label By Order Location, 9/26/2019 8:18 CDT  CBC w/ Auto Diff (Order): Now collect, 9/26/2019 8:18 CDT, Blood, Lab Collect, Print Label By Order Location, 9/26/2019 8:18 CDT  Pharmacy:  morphine 4 mg/mL preservative-free intravenous solution (Order): 4 mg, form: Injection, IV Push, Once-NOW, first dose 9/26/2019 8:19 CDT, stop date 9/26/2019 8:19 CDT, STAT, ( > 7 on pain scale)  Zofran 2 mg/mL injectable solution (Order): 4 mg, form: Injection, IV Push, Once, first dose 9/26/2019 8:19 CDT, stop date 9/26/2019 8:19 CDT, STAT  Normal Saline Infusion 1,000 mL (Order): 1,000 mL, 1,000 mL, IV, 1,000 mL/hr, start date 9/26/2019 8:18 CDT, Launch Orders   Radiology:  CT Abdomen and Pelvis W Contrast (Order): Stat, 9/26/2019 10:19 CDT, Abdominal Pain, None, Stretcher, Patient Has IV?, Rad Type, Launch Orders   Pharmacy:  Zosyn 3.375 gm (for IVPB) (Order): 3.375 gm, form: Infusion, IV Piggyback, Once, first dose 9/26/2019 12:24 CDT, stop date 9/26/2019 12:24 CDT, STAT.    Results review:  Lab results : Lab View   9/26/2019 9:22 CDT       UA Appear                 CLEAR                             UA Color                  YELLOW                             UA Spec Grav              1.021                             UA Bili                   Negative                             UA pH                     6.5                             UA Urobilinogen           1.0                             UA Blood                  Negative                             UA Glucose                Negative                             UA Ketones                Negative                             UA Protein                Negative                             UA Nitrite                Negative                              UA Leuk Est               Negative                             UA WBC                    NONE SEEN                             UA RBC                    NONE SEEN                             UA Bacteria               NONE SEEN /HPF                             UA Squam Epithelial       NONE SEEN    9/26/2019 9:01 CDT       Sodium Lvl                142 mmol/L                             Potassium Lvl             4.6 mmol/L                             Chloride                  106 mmol/L                             CO2                       27.0 mmol/L                             Calcium Lvl               8.5 mg/dL                             Glucose Lvl               118 mg/dL  HI                             BUN                       18.0 mg/dL                             Creatinine                0.89 mg/dL                             eGFR-AA                   >60 mL/min/1.73 m2  NA                             eGFR-DOMENICO                  >60 mL/min/1.73 m2  NA                             Bili Total                0.3 mg/dL                             Bili Direct               0.10 mg/dL                             Bili Indirect             0.20 mg/dL                             AST                       14 unit/L  LOW                             ALT                       15 unit/L                             Alk Phos                  65 unit/L                             Total Protein             6.3 gm/dL  LOW                             Albumin Lvl               3.60 gm/dL                             Globulin                  2.70 gm/dL                             A/G Ratio                 1.3 ratio                             Lipase Lvl                113 unit/L                             Troponin-I                <0.02 ng/mL                             WBC                       13.7 x10(3)/mcL  HI                             RBC                       4.57 x10(6)/mcL                             Hgb                        13.2 gm/dL                             Hct                       41.1 %                             Platelet                  189 x10(3)/mcL                             MCV                       89.9 fL                             MCH                       28.9 pg                             MCHC                      32.1 gm/dL  LOW                             RDW                       12.9 %                             MPV                       9.4 fL                             Abs Neut                  11.84 x10(3)/mcL  HI                             Neutro Auto               87 %  NA                             Lymph Auto                7 %  NA                             Mono Auto                 5 %  NA                             Eos Auto                  0 %  NA                             Abs Eos                   0.0 x10(3)/mcL                             Basophil Auto             0 %  NA                             Abs Neutro                11.84 x10(3)/mcL  HI                             Abs Lymph                 1.0 x10(3)/mcL                             Abs Mono                  0.7 x10(3)/mcL                             Abs Baso                  0.1 x10(3)/mcL  ,    No qualifying data available.    Radiology results:  Rad Results (ST)  < 12 hrs   Accession: [id number]  Order: CT Abdomen and Pelvis W Contrast  Report Dt/Tm: 09/26/2019 11:39  Report:      History.  Lower abdominal pain.     Reference.  No prior CT images are available for comparison     Technique.  Helical acquisition through the abdomen and pelvis with IV contrast.  Three plane reconstructions were provided for review. DLP 1107 mGycm.  Automatic exposure control, adjustment of mA/kV or iterative  reconstruction technique was used to reduce radiation.     Findings.  There is some bibasilar atelectasis or scarring. Heart size upper  limit normal.     The liver, spleen, gallbladder, pancreas, adrenals and kidneys  are  within normal limits.      No bowel obstruction. Some colonic wall thickening most conspicuous  around the splenic flexure is favored related to under distention. The  appendix is mildly dilated and fluid-filled with wall thickening and  mild periappendiceal fat stranding. No gross perforation or abscess.     Urinary bladder is unremarkable. The uterus is surgically absent.  There is no free fluid. The abdominal aorta is normal in caliber.  There are no enlarged retroperitoneal lymph nodes.      There are moderate degenerative changes of the spine.     Impression.  1. Findings compatible with early acute appendicitis. No gross  perforation or drainable abscess.  2. Areas of colonic wall thickening are favored related to under  distention but would recommend correlation with colonoscopy if patient  is not up-to-date on screening     Critical Result: Appendicitis     Results were reported to Vidal in the ED at 1132 on 9/26/2019.      .      Reexamination/ Reevaluation   Vital signs   Basic Oxygen Information   9/26/2019 7:37 CDT       SpO2                      98 %                             Oxygen Therapy            Room air        Impression and Plan   Diagnosis   Acute appendicitis ([id number] K35.80)      Calls-Consults   -  Recommends spoke with kianna pruett for surgery. admits patient..   Plan   Condition: Stable.    Disposition: Admit time  9/26/2019 12:28:00.    Notes: spoke with dr. yao regarding admission. he will have face to face with patient..

## 2022-04-30 NOTE — ED PROVIDER NOTES
Patient:   Beronica Olguin            MRN: [id number]            FIN: [id number]               Age:   64 years     Sex:  Female     :  1955   Associated Diagnoses:   None   Author:   Micky POWELL, Farrukh KRAUS      Addendum      Teaching-Supervisory Addendum-Brief   I participated in the following activities of this patients care: the medical history, the physical exam, medical decision making.   I personally performed: supervision of the patient's care, the medical history, the physical exam.   The case was discussed with: the nurse practitioner.   Evaluation and management service: I agree with the evaluation and management decisions made in this patient's care.   Results interpretation: I agree with the study interpretation in this patient's care.   Notes: I had face-to-face interaction with this patient..   My face-to-face interaction with the patient reveals that she's been having some nonspecific abdominal pain for the last several days.  Began with increased pain overnight decreased appetite and vomiting.    Physical exam patient awake alert, does not appear uncomfortable   CT reveals appendicitis.  Surgery has been counseled

## 2022-04-30 NOTE — OP NOTE
Patient:   Beronica Olguin            MRN: [id number]            FIN: [id number]               Age:   64 years     Sex:  Female     :  1955   Associated Diagnoses:   None   Author:   Maegan Beck MD      Brief Operative Note   Operative Information   Date/ Time:  2019 15:03:00.     Preoperative Diagnosis: Acute Appendicitis.     Postoperative Diagnosis: same.     Procedures Performed: Laparoscopic Appendectomy.     Surgeon: Maegan Beck MD.     Assistant: Tai POWELL, Adelia Downey Removed: appendix  .     Esimated blood loss: No blood loss, loss  5  cc.     Description of Procedure/Findings/    Complications: After informed consent was obtained, the patient was taken to the operative suite and general endotracheal anesthesia was induced. A timeout was performed confirming patient and procedure and the abdomen was prepped and draped in the normal sterile fashion. A 5mm incision was made above the patients umbilicus and a Veres needle was used to establish abdominal insufflation. Using an 5mm optiview trocar the camera was introduced, and under direct visualization a 12mm port was placed in the left lower quadrant as well as a 5mm port suprapubically. We then placed the patient in trendelenburg and airplaned toward the left. The appendix was easily visualized, grasped and a window was bluntly dissected in the mesentery adjacent to the appendix. A blue staple load on an endoGIA stapler was used to transect the appendiceal base and a white staple load was used to transect the appendiceal mesentery. The appendix was placed in an endocatch bag and removed through the 12mm port. The appendiceal base was inspected and found to be hemostatic. The ports were removed under direct visualization and the 12mm port fascia was closed with 0 vicryl suture. The skin was closed with 4-0 vicryl suture and sterile dressings were applied. All sponge and needle counts were correct at the  completion of the case, the patient was awakened from anesthesia, extubated, and taken to the PACU in stable condition. .

## 2022-05-02 NOTE — HISTORICAL [facility] CERNER
This is a historical note converted from [employer]. Formatting and pictures may have been removed.  Please reference [employer] for original formatting and attached multimedia. Chief Complaint  Productive coughing with post nasal onset 1 month ago along with low energy; denies fever or sore throat.  History of Present Illness  64-year-old present to clinic with history of productive coughing?on and off associated with nasal congestion and postnasal drip since 1 month.? Concerns of fatigue. ?No sore throat or difficulty swallowing, no fever. ?No chest pain.  Review of Systems  Constitutional : _+ ?fatigue, No Fever  HEENT : _No sore throat,+ sinus congestion  Neck : Negative except as documented in History of present illness  Respiratory : _Coughing, mucus production  Cardiovascular : _No chest pain, no palpitations  Gastrointestinal : _Negative except HPI  Integumentary : _No skin rash  Physical Exam  Vitals & Measurements  T:?36.8? ?C (Oral)? HR:?82(Peripheral)? RR:?16? BP:?139/86? SpO2:?97%?  HT:?162?cm? WT:?81?kg? BMI:?30.86?  General : Alert and Oriented, No apparent distress, afebrile  Neck - supple  HENT : Oropharynx?very mild redness and swelling.? Tonsils absent.?? TMs intact mild fluid no redness.??  Respiratory :?Bilateral equal breath sounds, nonlabored respirations  Cardiovascular : Rate, rhythm regular, normal volume pulse, no murmur  Gastrointestinal:?Full abdomen, soft, nontender to palpate  Integumentary : Warm, Dry and no rash  Assessment/Plan  1.?Acute bronchitis?J20.9  ?Cold mist vaporizer and cough drops to keep the airways moist.? Mucinex TM for cough and cold.? Continue Zyrtec for nasal congestion  Call or return to clinic for any questions  Reviewed chest x-ray today, no concerns of acute finding.  Ordered:  azithromycin, = 1 packet(s), Oral, As Directed, per package labeling, X 5 day(s), # 6 tab(s), 0 Refill(s), Pharmacy: [facility][id number]  predniSONE, 20 mg = 1 tab(s), Oral, BID, X 3  day(s), # 6 tab(s), 0 Refill(s), Pharmacy: [facility] #[id number]  Office/Outpatient Visit Level 3 Established [id number] PC, Acute bronchitis, [facility], 19 17:54:00 CST  ?  2.?Cough?R05  ?As above  ?   Problem List/Past Medical History  Ongoing  Acid reflux  Hypercholesterolemia  Hypothyroidism  Historical  arrythmias  Endometrial hyperplasia  Procedure/Surgical History  Appendectomy Laparoscopic (2019)  Laparoscopy, surgical, appendectomy (2019)  Resection of Appendix, Percutaneous Endoscopic Approach (2019)  Hysterectomy Laparoscopic Robotic Assist (.) (2014)  Laparoscopic removal of both ovaries and tubes at same operative episode (2014)  Laparoscopic robotic assisted procedure (2014)  Laparoscopically assisted vaginal hysterectomy (LAVH) (2014)  Breast augmentation ()  FESS - Functional endoscopic sinus surgery - posterior ethmoidectomy ()   section ()   section ()  Tonsillectomy   Medications  aspirin 81 mg oral tablet, 81 mg= 1 tab(s), Oral, Daily  gabapentin 300 mg oral capsule, 300 mg= 1 cap(s), Oral, TID  levothyroxine 50 mcg (0.05 mg) oral tablet, Oral, Daily  prednisONE 20 mg oral tablet, 20 mg= 1 tab(s), Oral, BID  Premarin 0.45 mg oral tablet, 0.45 mg= 1 tab(s), Oral, Daily  Trintellix 20 mg oral tablet, 20 mg= 1 tab(s), Oral, Daily  Vitamin D, 5000, Oral, Daily  Zithromax Z-Marcellus 250 mg oral tablet, 1 packet(s), Oral, As Directed  Zofran 4 mg oral tablet, 4 mg= 1 tab(s), Oral, q8hr, PRN  ZyrTEC, Daily  Allergies  Demerol HCl?(Vomiting)  Ultram?(Vomiting, Nausea)  codeine?(Vomiting)  Social History  Abuse/Neglect  No, 2019  Alcohol - Low Risk, 2014  Substance Use - Denies Substance Abuse, 2014  Never, 2019  Tobacco - Denies Tobacco Use, 2014  Never (less than 100 in lifetime), No, 2019  Family History  Arthritis.: Father.  Cervix cancer.: Mother.  Diabetes mellitus type 2:  Mother.  Hypertension.: Mother.  Skin cancer.: Mother.  Health Maintenance  Health Maintenance  ???Pending?(in the next year)  ??? ??OverDue  ??? ? ? ?Diabetes Screening due??and every?  ??? ? ? ?Alcohol Misuse Screening due??01/01/19??and every 1??year(s)  ??? ??Due?  ??? ? ? ?ADL Screening due??12/18/19??and every 1??year(s)  ??? ? ? ?Breast Cancer Screening due??12/18/19??and every?  ??? ? ? ?Colorectal Screening due??12/18/19??and every?  ??? ? ? ?Depression Screening due??12/18/19??and every?  ??? ? ? ?Influenza Vaccine due??12/18/19??and every?  ??? ? ? ?Tetanus Vaccine due??12/18/19??and every 10??year(s)  ??? ? ? ?Zoster Vaccine due??12/18/19??and every 100??year(s)  ??? ??Due In Future?  ??? ? ? ?Obesity Screening not due until??01/01/20??and every 1??year(s)  ??? ? ? ?Aspirin Therapy for CVD Prevention not due until??09/27/20??and every 1??year(s)  ??? ? ? ?Blood Pressure Screening not due until??12/17/20??and every 1??year(s)  ??? ? ? ?Body Mass Index Check not due until??12/17/20??and every 1??year(s)  ???Satisfied?(in the past 1 year)  ??? ??Satisfied?  ??? ? ? ?Aspirin Therapy for CVD Prevention on??09/27/19.??Satisfied by Earlene Bustamante RN  ??? ? ? ?Blood Pressure Screening on??12/18/19.??Satisfied by Adithya Dias LPN  ??? ? ? ?Body Mass Index Check on??12/18/19.??Satisfied by Adithya Dias LPN  ??? ? ? ?Cervical Cancer Screening on??08/28/19.??Satisfied by danny Bishop  ??? ? ? ?Diabetes Screening on??10/10/19.??Satisfied by Odalys Bautista  ??? ? ? ?Influenza Vaccine on??09/26/19.??Satisfied by Maty Montemayor LPN  ??? ? ? ?Lipid Screening on??12/17/19.??Satisfied by Teetee Schulte  ??? ? ? ?Obesity Screening on??12/18/19.??Satisfied by Adithya Dias LPN  ?

## 2022-05-02 NOTE — HISTORICAL [facility]
This is a historical note converted from [employer]. Formatting and pictures may have been removed.  Please reference [employer]erkrystal for original formatting and attached multimedia. Admit and Discharge Dates  Admit Date: 09/26/2019  Discharge Date: 09/27/2019  Physicians  Attending Physician - Ebony POWELL, Maegan Green  Admitting Physician - Ebony POWELL, Maegan Green  Primary Care Physician - Christos POWELL, Angel NGO  Discharge Diagnosis  Abdominal pain?[id number]  Acute appendicitis?K35.80  Surgical Procedures  09/26/2019 - [id number] - Appendectomy Laparoscopic  Immunizations  No immunizations recorded for this visit.  Admission Information  4F presented to the ER with 4 days of abd pain. States that she had worsening pain over the last 12-24 horus with n/v and localization of pain to the RLQ. Had chills but did not check temperature. Presented to ER for evaluation and was found to have leukocytosis and appendicitis on CT .?  Hospital Course  She was admitted and underwent laparoscopic appendectomy which she tolerated well. Post-operatively she was afebrile with stable vitals, tolerating a regular diet without nausea vomiting, with good pain control on PO medications, ambulating and voiding spontaneously. She was determined to be stable for discharge home on POD#1.  Significant Findings  Acute appendicitis  Time Spent on discharge  <20 minutes  Objective  Vitals & Measurements  T:?36.5? ?C (Oral)? HR:?65(Peripheral)? RR:?20? BP:?102/58?  Physical Exam  Gen: NAD  HEENT: normocephalic, atraumatic, no scleral icterus  CV: regular rate and rhythm  Resp: no increased work of breathing, MIKALA  GI: abdomen soft, appropriately TTP, not distended, laparoscopic port-site incisions with steri-strips c/d/i  : no sprague  Vasc: warm and well-perfused  Patient Discharge Condition  Good  Discharge Disposition  Home   Discharge Medication Reconciliation  Prescribed  acetaminophen-oxyCODONE (Percocet 5/325 oral  tablet)?1 tab(s), Oral, q6hr, PRN pain, moderate  ondansetron (Zofran 4 mg oral tablet)?4 mg, Oral, q8hr, PRN as needed for nausea/vomiting  Continue  aspirin (aspirin 81 mg oral tablet)?81 mg, Oral, Daily  cetirizine (ZyrTEC), Daily  conjugated estrogens (Premarin 0.45 mg oral tablet)?0.45 mg, Oral, Daily  ergocalciferol (Vitamin D)?5000, Oral, Daily  levothyroxine (levothyroxine 50 mcg (0.05 mg) oral tablet), Oral, Daily  vortioxetine (Trintellix 20 mg oral tablet)?20 mg, Oral, Daily  Education and Orders Provided  Appendicitis, Easy-to-Read  Laparoscopic Appendectomy, Adult, Care After  Discharge Wound Care - 09/27/19 14:32:00 CDT, 1-2x/Day, Okay to shower and let soap and water run over incisions. Do not submerge in bathtubs or pools. Steri-strips will peel up and fall off over the next week.?  Discharge - 09/27/19 14:32:00 CDT, Home, Give all scheduled vaccinations prior to discharge.?  Discharge Activity - Activity as Tolerated, No heavy lifting >10-15 pounds for 4 weeks.?  Discharge Diet - Regular?  Follow up  Surgical Hospitalist Clinic Appointment, on 10/07/2019  ????Keep scheduled appointment      did well post lappy with standard postop pains.? OK for discharge

## 2022-05-02 NOTE — HISTORICAL [facility]
This is a historical note converted from [employer]. Formatting and pictures may have been removed.  Please reference Luann for original formatting and attached multimedia. Chief Complaint  pt states numbness down the R leg and pain is mostly in the ?R thigh. from the back to the thigh. PT recommended to see specialist.  History of Present Illness  66 year old female presents today for evaluation of her right hip and low back pain.? Her pain is been present for couple months. ?Is located in the groin and does radiate down the thigh. ?She gets occasional numbness. ?She was?seen by her PCP and treated with physical therapy which provided?some relief.? She gets occasional low back pain.? She denies any history of injury.  Review of Systems  Systemic: No fever, no chills, and no recent weight change.  Head: No headache - frequent.  Eyes: No vision problems.  Otolarnygeal: No hearing loss, no earache, no epistaxis, no hoarseness, and no tooth pain. Gums normal.  Cardiovascular: No chest pain or discomfort and no palpitations.  Pulmonary: No pulmonary symptoms - no dyspnea, no shortness of breath  Gastrointestinal: Appetite not decreased. No dysphagia and no constant eructation. No nausea, no vomiting, no abdominal pain, no hematochezia.  Genitourinary: No genitourinary symptoms - No urinary hesitancy. No urinary loss of control - no burning sensation during urination.  Musculoskeletal: No calf muscle cramps and no localized soft tissue swelling  Neurological: No fainting and no convulsions.  Psychological: no depression.  Skin: No rash.  Physical Exam  Vitals & Measurements  T:?98.4? ?F (Oral)? HR:?75(Peripheral)? BP:?142/85?  HT:?162.00?cm? WT:?82.550?kg? BMI:?31.45?  ?  ?  Right hip exam  No signs of edema, erythema, or induration. No muscle atrophy seen. Tenderness to palpation over the greater trochanteric region. No tenderness over the sacroiliac joint.  Pain was elicited by active internal rotation with the hip  extended. Pain was elicited by active external rotation with the hip extended. Pain was elicited by active internal rotation with the hip flexed. Pain was elicited by active external rotation with the hip flexed. Pain was elicited by active motion. Pain was elicited by passive motion. Hip was not dislocated or subluxed. No instability or laxity seen.  Passive hip abduction?30 degrees  Passive hip flexion 90 degrees  Passive internal rotation 10 degrees  Passive external rotation 20 degrees  Neurological:  Motor (Motor Strength): No weakness of the left hip was observed.  ?  ?  Tests  Imaging:  Right hip x-ray with two or more views of the AP and lateral aspects were performed.  Impressions  moderate Joint space narrowing with periacetabular osteophytes.  ?  Lumbar radiographs reviewed?from June 2021 showing?mild degenerative disc disease.? She does have?lumbosacral?autofusion seen.  Assessment/Plan  1.?Primary osteoarthritis of right hip?M16.11  ?recommend physical therapy program focusing on hip capsular stretching.? Discussed fluroscopic guided right hip intra-articular corticosteroid injection?if continued pain.? F/u as needed  Ordered:  XR Hip Right 2 Views, Routine, 12/08/21 11:33:00 CST, None, Stretcher, Patient Has IV?, Rad Type, Primary osteoarthritis of right hip, Not Scheduled, 12/08/21 11:33:00 CST  ?  2.?Lumbar degenerative disc disease?M51.36  ?  Referrals  PT/OT Ambulatory Referral, Specialty: Physical Therapy, Start: 12/08/21 11:43:00 CST, 4, Anit Grav Hip Abductor & Extensor Exc.  Aquatics for ROM & Strength  Isotonic hamstring & Closed Chain Quad  No Dry Needling  Therapeutic Excercise  Stretch and Strengthen, Instruction...   Problem List/Past Medical History  Ongoing  Acid reflux  Dysesthesia  Hypercholesterolemia  Hyperreflexia  Hypothyroidism  Lumbago  Lumbar degenerative disc disease  Nerve disorder  Primary osteoarthritis of right hip  Urge urinary incontinence  Urinary  frequency  Historical  arrythmias  Endometrial hyperplasia  Procedure/Surgical History  Appendectomy Laparoscopic (2019)  Laparoscopy, surgical, appendectomy (2019)  Resection of Appendix, Percutaneous Endoscopic Approach (2019)  Right knee scope ()  Hysterectomy Laparoscopic Robotic Assist (.) (2014)  Laparoscopic removal of both ovaries and tubes at same operative episode (2014)  Laparoscopic robotic assisted procedure (2014)  Laparoscopically assisted vaginal hysterectomy (LAVH) (2014)  Breast augmentation ()  FESS - Functional endoscopic sinus surgery - posterior ethmoidectomy ()   section ()   section ()  Tonsillectomy   Medications  Amitiza 24 mcg oral capsule, 24 mcg= 1 cap(s), Oral, Daily  aspirin 81 mg oral tablet, 81 mg= 1 tab(s), Oral, Daily  cyclobenzaprine 10 mg oral tablet, 10 mg= 1 tab(s), Oral, qPM  esomeprazole 40 mg oral DR capsule, 40 mg= 1 cap(s), Oral, Daily  FLUoxetine 40 mg oral capsule, 40 mg= 1 cap(s), Oral, qAM  levothyroxine 50 mcg (0.05 mg) oral tablet, Oral, Daily  Premarin 0.45 mg oral tablet, 0.45 mg= 1 tab(s), Oral, Daily  Vitamin D, 5000, Oral, Daily  Allergies  Demerol HCl?(Vomiting)  Ultram?(Vomiting, Nausea)  codeine?(Vomiting)  Social History  Abuse/Neglect  No, No, Yes, 2021  Alcohol - Low Risk, 2014  Current, Wine, 1-2 times per year, 2020  Substance Use - Denies Substance Abuse, 2014  Never, 2020  Tobacco - Denies Tobacco Use, 2014  Never (less than 100 in lifetime), N/A, 2021  Family History  Arthritis.: Father.  Cancer: Mother.  Cervix cancer.: Mother.  Diabetes mellitus type 2: Mother and Sister.  Hypertension.: Mother.  Muscular dystrophy: Father.  Skin cancer.: Mother.  Immunizations  Vaccine Date Status   COVID-19 mRNA, LNP-S, PF - Moderna 2021 Recorded   COVID-19 mRNA, LNP-S, PF - Moderna 2021 Recorded   influenza virus vaccine, inactivated  09/30/2009 Recorded   Health Maintenance  Health Maintenance  ???Pending?(in the next year)  ??? ??OverDue  ??? ? ? ?Aspirin Therapy for CVD Prevention due??09/27/20??and every 1??year(s)  ??? ? ? ?Alcohol Misuse Screening due??01/02/21??and every 1??year(s)  ??? ? ? ?Cognitive Screening due??01/02/21??and every 1??year(s)  ??? ? ? ?Functional Assessment due??01/02/21??and every 1??year(s)  ??? ??Due?  ??? ? ? ?ADL Screening due??12/08/21??and every 1??year(s)  ??? ? ? ?Colorectal Screening due??12/08/21??Unknown Frequency  ??? ? ? ?Medicare Annual Wellness Exam due??12/08/21??and every 1??year(s)  ??? ? ? ?Pneumococcal Vaccine due??12/08/21??Unknown Frequency  ??? ? ? ?Tetanus Vaccine due??12/08/21??and every 10??year(s)  ??? ? ? ?Zoster Vaccine due??12/08/21??Unknown Frequency  ??? ??Due In Future?  ??? ? ? ?Obesity Screening not due until??01/01/22??and every 1??year(s)  ??? ? ? ?Advance Directive not due until??01/02/22??and every 1??year(s)  ??? ? ? ?Fall Risk Assessment not due until??01/02/22??and every 1??year(s)  ???Satisfied?(in the past 1 year)  ??? ??Satisfied?  ??? ? ? ?Advance Directive on??09/21/21.??Satisfied by Adam Connors LPN  ??? ? ? ?Blood Pressure Screening on??12/08/21.??Satisfied by Moriah Gamino  ??? ? ? ?Body Mass Index Check on??12/08/21.??Satisfied by Moriah Gamino  ??? ? ? ?Bone Density Screening on??07/27/21.??Satisfied by Jamila Naranjo  ??? ? ? ?Diabetes Screening on??05/13/21.??Satisfied by Dennise Horn  ??? ? ? ?Fall Risk Assessment on??12/08/21.??Satisfied by Moriah Gamino  ??? ? ? ?Influenza Vaccine on??12/08/21.??Satisfied by Moriah Gamino  ??? ? ? ?Obesity Screening on??12/08/21.??Satisfied by Moriah Gamino  ?

## 2022-05-11 ENCOUNTER — LAB VISIT (OUTPATIENT)
Dept: LAB | Facility: HOSPITAL | Age: 67
End: 2022-05-11
Attending: INTERNAL MEDICINE
Payer: MEDICARE

## 2022-05-11 DIAGNOSIS — E03.9 MYXEDEMA HEART DISEASE: ICD-10-CM

## 2022-05-11 DIAGNOSIS — Z00.00 ROUTINE GENERAL MEDICAL EXAMINATION AT A HEALTH CARE FACILITY: Primary | ICD-10-CM

## 2022-05-11 DIAGNOSIS — I51.9 MYXEDEMA HEART DISEASE: ICD-10-CM

## 2022-05-11 DIAGNOSIS — E78.5 HYPERLIPIDEMIA, UNSPECIFIED HYPERLIPIDEMIA TYPE: ICD-10-CM

## 2022-05-11 DIAGNOSIS — K21.9 GASTROESOPHAGEAL REFLUX DISEASE, UNSPECIFIED WHETHER ESOPHAGITIS PRESENT: ICD-10-CM

## 2022-05-11 LAB
ALBUMIN SERPL-MCNC: 3.7 GM/DL (ref 3.4–4.8)
ALBUMIN/GLOB SERPL: 1.5 RATIO (ref 1.1–2)
ALP SERPL-CCNC: 69 UNIT/L (ref 40–150)
ALT SERPL-CCNC: 9 UNIT/L (ref 0–55)
APPEARANCE UR: CLEAR
AST SERPL-CCNC: 13 UNIT/L (ref 5–34)
BACTERIA #/AREA URNS AUTO: NORMAL /HPF
BASOPHILS # BLD AUTO: 0.04 X10(3)/MCL (ref 0–0.2)
BASOPHILS NFR BLD AUTO: 0.7 %
BILIRUB UR QL STRIP.AUTO: NEGATIVE MG/DL
BILIRUBIN DIRECT+TOT PNL SERPL-MCNC: 0.2 MG/DL (ref 0–0.5)
BILIRUBIN DIRECT+TOT PNL SERPL-MCNC: 0.3 MG/DL (ref 0–0.8)
BILIRUBIN DIRECT+TOT PNL SERPL-MCNC: 0.5 MG/DL
BUN SERPL-MCNC: 16.2 MG/DL (ref 9.8–20.1)
CALCIUM SERPL-MCNC: 9 MG/DL (ref 8.4–10.2)
CHLORIDE SERPL-SCNC: 102 MMOL/L (ref 98–107)
CHOLEST SERPL-MCNC: 274 MG/DL
CHOLEST/HDLC SERPL: 4 {RATIO} (ref 0–5)
CO2 SERPL-SCNC: 28 MMOL/L (ref 23–31)
COLOR UR AUTO: YELLOW
CREAT SERPL-MCNC: 0.75 MG/DL (ref 0.55–1.02)
EOSINOPHIL # BLD AUTO: 0.24 X10(3)/MCL (ref 0–0.9)
EOSINOPHIL NFR BLD AUTO: 4.1 %
ERYTHROCYTE [DISTWIDTH] IN BLOOD BY AUTOMATED COUNT: 13.1 % (ref 11.5–17)
GLOBULIN SER-MCNC: 2.4 GM/DL (ref 2.4–3.5)
GLUCOSE SERPL-MCNC: 102 MG/DL (ref 82–115)
GLUCOSE UR QL STRIP.AUTO: NEGATIVE MG/DL
HCT VFR BLD AUTO: 42.9 % (ref 37–47)
HDLC SERPL-MCNC: 72 MG/DL (ref 35–60)
HGB BLD-MCNC: 14 GM/DL (ref 12–16)
IMM GRANULOCYTES # BLD AUTO: 0.02 X10(3)/MCL (ref 0–0.02)
IMM GRANULOCYTES NFR BLD AUTO: 0.3 % (ref 0–0.43)
KETONES UR QL STRIP.AUTO: NEGATIVE MG/DL
LDLC SERPL CALC-MCNC: 179 MG/DL (ref 50–140)
LEUKOCYTE ESTERASE UR QL STRIP.AUTO: NEGATIVE UNIT/L
LYMPHOCYTES # BLD AUTO: 1.61 X10(3)/MCL (ref 0.6–4.6)
LYMPHOCYTES NFR BLD AUTO: 27.3 %
MCH RBC QN AUTO: 29 PG (ref 27–31)
MCHC RBC AUTO-ENTMCNC: 32.6 MG/DL (ref 33–36)
MCV RBC AUTO: 88.8 FL (ref 80–94)
MONOCYTES # BLD AUTO: 0.44 X10(3)/MCL (ref 0.1–1.3)
MONOCYTES NFR BLD AUTO: 7.5 %
NEUTROPHILS # BLD AUTO: 3.6 X10(3)/MCL (ref 2.1–9.2)
NEUTROPHILS NFR BLD AUTO: 60.1 %
NITRITE UR QL STRIP.AUTO: NEGATIVE
NRBC BLD AUTO-RTO: 0 %
PH UR STRIP.AUTO: 6.5 [PH]
PLATELET # BLD AUTO: 249 X10(3)/MCL (ref 130–400)
PMV BLD AUTO: 9.7 FL (ref 9.4–12.4)
POTASSIUM SERPL-SCNC: 4.4 MMOL/L (ref 3.5–5.1)
PROT SERPL-MCNC: 6.1 GM/DL (ref 5.8–7.6)
PROT UR QL STRIP.AUTO: NEGATIVE MG/DL
RBC # BLD AUTO: 4.83 X10(6)/MCL (ref 4.2–5.4)
RBC #/AREA URNS AUTO: NORMAL /HPF
RBC UR QL AUTO: NEGATIVE UNIT/L
SODIUM SERPL-SCNC: 137 MMOL/L (ref 136–145)
SP GR UR STRIP.AUTO: 1.02 (ref 1–1.03)
SQUAMOUS #/AREA URNS AUTO: NORMAL /LPF
TRIGL SERPL-MCNC: 117 MG/DL (ref 37–140)
TSH SERPL-ACNC: 2.38 UIU/ML (ref 0.35–4.94)
UROBILINOGEN UR STRIP-ACNC: 0.2 MG/DL
VLDLC SERPL CALC-MCNC: 23 MG/DL
WBC # SPEC AUTO: 5.9 X10(3)/MCL (ref 4.5–11.5)
WBC #/AREA URNS AUTO: NORMAL /HPF

## 2022-05-11 PROCEDURE — 81001 URINALYSIS AUTO W/SCOPE: CPT

## 2022-05-11 PROCEDURE — 36415 COLL VENOUS BLD VENIPUNCTURE: CPT

## 2022-05-11 PROCEDURE — 80061 LIPID PANEL: CPT

## 2022-05-11 PROCEDURE — 80053 COMPREHEN METABOLIC PANEL: CPT

## 2022-05-11 PROCEDURE — 85025 COMPLETE CBC W/AUTO DIFF WBC: CPT

## 2022-05-11 PROCEDURE — 81003 URINALYSIS AUTO W/O SCOPE: CPT

## 2022-05-11 PROCEDURE — 84443 ASSAY THYROID STIM HORMONE: CPT

## 2022-05-16 ENCOUNTER — OFFICE VISIT (OUTPATIENT)
Dept: INTERNAL MEDICINE | Facility: CLINIC | Age: 67
End: 2022-05-16
Payer: MEDICARE

## 2022-05-16 VITALS
TEMPERATURE: 98 F | BODY MASS INDEX: 31.92 KG/M2 | DIASTOLIC BLOOD PRESSURE: 80 MMHG | HEIGHT: 64 IN | WEIGHT: 187 LBS | HEART RATE: 78 BPM | SYSTOLIC BLOOD PRESSURE: 138 MMHG | RESPIRATION RATE: 18 BRPM

## 2022-05-16 DIAGNOSIS — Z00.00 WELLNESS EXAMINATION: Primary | ICD-10-CM

## 2022-05-16 DIAGNOSIS — E03.9 HYPOTHYROIDISM, UNSPECIFIED TYPE: ICD-10-CM

## 2022-05-16 DIAGNOSIS — M19.90 ARTHRITIS: ICD-10-CM

## 2022-05-16 PROBLEM — N90.5 VULVAR ATROPHY: Status: RESOLVED | Noted: 2021-08-03 | Resolved: 2022-05-16

## 2022-05-16 PROBLEM — R87.622 LOW GRADE SQUAMOUS INTRAEPITH LESION ON CYTOLOGIC SMEAR VAGINA (LGSIL): Status: RESOLVED | Noted: 2020-03-12 | Resolved: 2022-05-16

## 2022-05-16 PROCEDURE — 99397 PER PM REEVAL EST PAT 65+ YR: CPT | Mod: GY,,, | Performed by: INTERNAL MEDICINE

## 2022-05-16 PROCEDURE — 99397 PR PREVENTIVE VISIT,EST,65 & OVER: ICD-10-PCS | Mod: GY,,, | Performed by: INTERNAL MEDICINE

## 2022-05-16 NOTE — PROGRESS NOTES
Angel Matos MD        PATIENT NAME: Beronica Olguin  : 1955  DATE: 22  MRN: [id number]      Billing Provider: Angel Matos MD  Level of Service: MA PREVENTIVE VISIT,EST,65 & OVER  Patient PCP Information     Provider PCP Type    Angel Matos MD General          Reason for Visit / Chief Complaint: Medicare AWV, Fatigue, Hip Pain (Arthritis/), and general arthritic pain everywhere       Update PCP  Update Chief Complaint         History of Present Illness / Problem Focused Workflow     Beronica Olguin presents to the clinic with Medicare AWV, Fatigue, Hip Pain (Arthritis/), and general arthritic pain everywhere     Beronica having her annual visit today.  She complained about a flare from arthritis.  She had seen rheumatology in the past.  He has also saw Dr. Nielson last year mentions something about her bone density.  She had a scan in  which she says was abnormal.  I reviewed the scan of 2019 which was normal.    Fatigue  Associated symptoms include fatigue.   Hip Pain         Review of Systems   Review of Systems   Constitutional: Positive for fatigue.   HENT: Negative.    Eyes: Negative.    Respiratory: Negative.    Cardiovascular: Negative.    Gastrointestinal: Negative.    Endocrine: Negative.    Genitourinary: Negative.    Musculoskeletal: Positive for back pain.   Integumentary:  Negative.   Neurological: Negative.    Psychiatric/Behavioral: Negative.         Medical / Social / Family History     Past Medical History:   Diagnosis Date    Arthritis 2017    LGSIL    Depression     Menopause     Thyroid disease     Vulvar atrophy 8/3/2021       Past Surgical History:   Procedure Laterality Date    AUGMENTATION OF BREAST          HYSTERECTOMY      ALLI/BSO for PMB.     MA REMOVAL OF OVARY/TUBE(S)      TONSILLECTOMY         Social History  Ms. Olguin  reports that she has never smoked. She has never used smokeless tobacco. She reports current alcohol use of  about 1.0 standard drink of alcohol per week. She reports that she does not use drugs.    Family History  Ms.'s Vige  family history includes Diabetes in her maternal grandfather and maternal grandmother; Heart disease in her maternal grandfather; Hypertension in her mother.    Medications and Allergies     Medications  Outpatient Medications Marked as Taking for the 5/16/22 encounter (Office Visit) with Angel Sher MD   Medication Sig Dispense Refill    aspirin 81 MG Chew Take 81 mg by mouth once daily.      cholecalciferol, vitamin D3, 125 mcg (5,000 unit) Tab Take 5,000 Units by mouth once daily.      esomeprazole (NEXIUM) 40 MG capsule       FLUoxetine 40 MG capsule Take 40 mg by mouth.      levothyroxine (SYNTHROID) 50 MCG tablet       PREMARIN 0.45 mg tablet          Allergies  Review of patient's allergies indicates:   Allergen Reactions    Codeine Nausea And Vomiting     hyperemesis    Demerol [meperidine] Nausea And Vomiting     HYPEREMESIS    Tramadol Nausea Only and Nausea And Vomiting       Physical Examination     Vitals:    05/16/22 1035   BP: 138/80   Pulse: 78   Resp: 18   Temp: 98 °F (36.7 °C)     Physical Exam  Constitutional:       Appearance: Normal appearance.   HENT:      Head: Normocephalic and atraumatic.      Right Ear: Tympanic membrane normal.      Left Ear: Tympanic membrane normal.      Nose: Nose normal.      Mouth/Throat:      Mouth: Mucous membranes are moist.   Eyes:      Extraocular Movements: Extraocular movements intact.      Pupils: Pupils are equal, round, and reactive to light.   Cardiovascular:      Rate and Rhythm: Normal rate and regular rhythm.      Pulses: Normal pulses.   Pulmonary:      Effort: Pulmonary effort is normal.      Breath sounds: Normal breath sounds.   Abdominal:      General: Abdomen is flat. Bowel sounds are normal.      Palpations: Abdomen is soft.   Musculoskeletal:         General: Normal range of motion.      Cervical back: Normal  range of motion and neck supple.   Skin:     General: Skin is warm and dry.   Neurological:      General: No focal deficit present.      Mental Status: She is alert and oriented to person, place, and time.   Psychiatric:         Mood and Affect: Mood normal.         Behavior: Behavior normal.          Assessment and Plan (including Health Maintenance)      Problem List  Smart Sets  Document Outside HM   :    Plan:   Wellness examination    Hypothyroidism, unspecified type       Will make referral to rheumatologist.  She will try to track down her bone density and report to us.  She will return in 1 year for annual exam.      Health Maintenance Due   Topic Date Due    Hepatitis C Screening  Never done    TETANUS VACCINE  Never done    Shingles Vaccine (1 of 2) Never done    Pneumococcal Vaccines (Age 65+) (1 - PCV) Never done    COVID-19 Vaccine (3 - Booster for Moderna series) 08/11/2021    Mammogram  06/30/2022       Problem List Items Addressed This Visit        Endocrine    RESOLVED: Hypothyroid      Other Visit Diagnoses     Wellness examination    -  Primary          Health Maintenance Topics with due status: Not Due       Topic Last Completion Date    Influenza Vaccine 09/30/2009    Colorectal Cancer Screening 03/16/2018    DEXA Scan 07/26/2021    Lipid Panel 05/11/2022       No future appointments.         Signature:  Angel Matos MD  [facility]  [facility] [facility] [id number]    Date of encounter: 5/16/22

## 2022-05-17 ENCOUNTER — PATIENT OUTREACH (OUTPATIENT)
Dept: ADMINISTRATIVE | Facility: HOSPITAL | Age: 67
End: 2022-05-17
Payer: MEDICARE

## 2022-05-17 NOTE — PROGRESS NOTES
Population Health. Out Reach.  The following record(s)  below were uploaded for Health Maintenance .     8/5/19 DEXA SCREENING

## 2022-06-13 ENCOUNTER — TELEPHONE (OUTPATIENT)
Dept: INTERNAL MEDICINE | Facility: CLINIC | Age: 67
End: 2022-06-13
Payer: MEDICARE

## 2022-06-13 DIAGNOSIS — M19.90 ARTHRITIS: Primary | ICD-10-CM

## 2022-06-13 NOTE — TELEPHONE ENCOUNTER
----- Message from LONNIE Weber sent at 6/13/2022  5:14 PM CDT -----  Regarding: RE: rheumatology referral    Please find out what rheumatologist she wants to go to, so we can re-send send.  If she does not have a preference we can send to Dr. Andino      ----- Message -----  From: Beronica Roblero  Sent: 6/13/2022   9:39 AM CDT  To: Angel Sher MD  Subject: rheumatology referral                            Patient called and did not hear from rheumatologist you referred her to.  I printed referral and there is no name of rheumatologist on referral.

## 2022-07-10 ENCOUNTER — HOSPITAL ENCOUNTER (EMERGENCY)
Facility: HOSPITAL | Age: 67
Discharge: HOME | End: 2022-07-10
Attending: EMERGENCY MEDICINE
Payer: MEDICARE

## 2022-07-10 VITALS
HEART RATE: 82 BPM | RESPIRATION RATE: 18 BRPM | SYSTOLIC BLOOD PRESSURE: 112 MMHG | BODY MASS INDEX: 30.9 KG/M2 | WEIGHT: 180 LBS | OXYGEN SATURATION: 97 % | TEMPERATURE: 98 F | DIASTOLIC BLOOD PRESSURE: 58 MMHG

## 2022-07-10 DIAGNOSIS — M54.50 ACUTE LEFT-SIDED LOW BACK PAIN WITHOUT SCIATICA: Primary | ICD-10-CM

## 2022-07-10 LAB
ALBUMIN SERPL-MCNC: 3.8 GM/DL (ref 3.4–4.8)
ALBUMIN/GLOB SERPL: 1.2 RATIO (ref 1.1–2)
ALP SERPL-CCNC: 82 UNIT/L (ref 40–150)
ALT SERPL-CCNC: 10 UNIT/L (ref 0–55)
APPEARANCE UR: CLEAR
AST SERPL-CCNC: 13 UNIT/L (ref 5–34)
BACTERIA #/AREA URNS AUTO: NORMAL /HPF
BASOPHILS # BLD AUTO: 0.05 X10(3)/MCL (ref 0–0.2)
BASOPHILS NFR BLD AUTO: 0.6 %
BILIRUB UR QL STRIP.AUTO: NEGATIVE MG/DL
BILIRUBIN DIRECT+TOT PNL SERPL-MCNC: 0.5 MG/DL
BUN SERPL-MCNC: 13.4 MG/DL (ref 9.8–20.1)
CALCIUM SERPL-MCNC: 9.5 MG/DL (ref 8.4–10.2)
CHLORIDE SERPL-SCNC: 104 MMOL/L (ref 98–107)
CO2 SERPL-SCNC: 26 MMOL/L (ref 23–31)
COLOR UR AUTO: YELLOW
CREAT SERPL-MCNC: 0.9 MG/DL (ref 0.55–1.02)
EOSINOPHIL # BLD AUTO: 0.14 X10(3)/MCL (ref 0–0.9)
EOSINOPHIL NFR BLD AUTO: 1.6 %
ERYTHROCYTE [DISTWIDTH] IN BLOOD BY AUTOMATED COUNT: 12.9 % (ref 11.5–17)
FLUAV AG UPPER RESP QL IA.RAPID: NOT DETECTED
FLUBV AG UPPER RESP QL IA.RAPID: NOT DETECTED
GLOBULIN SER-MCNC: 3.1 GM/DL (ref 2.4–3.5)
GLUCOSE SERPL-MCNC: 103 MG/DL (ref 82–115)
GLUCOSE UR QL STRIP.AUTO: NEGATIVE MG/DL
HCT VFR BLD AUTO: 43.3 % (ref 37–47)
HGB BLD-MCNC: 14 GM/DL (ref 12–16)
IMM GRANULOCYTES # BLD AUTO: 0.03 X10(3)/MCL (ref 0–0.04)
IMM GRANULOCYTES NFR BLD AUTO: 0.3 %
KETONES UR QL STRIP.AUTO: NEGATIVE MG/DL
LEUKOCYTE ESTERASE UR QL STRIP.AUTO: ABNORMAL UNIT/L
LYMPHOCYTES # BLD AUTO: 1.91 X10(3)/MCL (ref 0.6–4.6)
LYMPHOCYTES NFR BLD AUTO: 22 %
MCH RBC QN AUTO: 29 PG (ref 27–31)
MCHC RBC AUTO-ENTMCNC: 32.3 MG/DL (ref 33–36)
MCV RBC AUTO: 89.8 FL (ref 80–94)
MONOCYTES # BLD AUTO: 0.64 X10(3)/MCL (ref 0.1–1.3)
MONOCYTES NFR BLD AUTO: 7.4 %
NEUTROPHILS # BLD AUTO: 5.9 X10(3)/MCL (ref 2.1–9.2)
NEUTROPHILS NFR BLD AUTO: 68.1 %
NITRITE UR QL STRIP.AUTO: NEGATIVE
NRBC BLD AUTO-RTO: 0 %
PH UR STRIP.AUTO: 7 [PH]
PLATELET # BLD AUTO: 260 X10(3)/MCL (ref 130–400)
PMV BLD AUTO: 9.1 FL (ref 7.4–10.4)
POTASSIUM SERPL-SCNC: 4.3 MMOL/L (ref 3.5–5.1)
PROT SERPL-MCNC: 6.9 GM/DL (ref 5.8–7.6)
PROT UR QL STRIP.AUTO: NEGATIVE MG/DL
RBC # BLD AUTO: 4.82 X10(6)/MCL (ref 4.2–5.4)
RBC #/AREA URNS AUTO: <5 /HPF
RBC UR QL AUTO: NEGATIVE UNIT/L
SARS-COV-2 RNA RESP QL NAA+PROBE: NOT DETECTED
SODIUM SERPL-SCNC: 140 MMOL/L (ref 136–145)
SP GR UR STRIP.AUTO: 1.02 (ref 1–1.03)
SQUAMOUS #/AREA URNS AUTO: <5 /HPF
UROBILINOGEN UR STRIP-ACNC: 0.2 MG/DL
WBC # SPEC AUTO: 8.7 X10(3)/MCL (ref 4.5–11.5)
WBC #/AREA URNS AUTO: <5 /HPF

## 2022-07-10 PROCEDURE — 63600175 PHARM REV CODE 636 W HCPCS: Performed by: NURSE PRACTITIONER

## 2022-07-10 PROCEDURE — 87636 SARSCOV2 & INF A&B AMP PRB: CPT | Performed by: NURSE PRACTITIONER

## 2022-07-10 PROCEDURE — 99284 EMERGENCY DEPT VISIT MOD MDM: CPT | Mod: 25,CS

## 2022-07-10 PROCEDURE — 63600175 PHARM REV CODE 636 W HCPCS: Performed by: PHYSICIAN ASSISTANT

## 2022-07-10 PROCEDURE — 36415 COLL VENOUS BLD VENIPUNCTURE: CPT | Performed by: PHYSICIAN ASSISTANT

## 2022-07-10 PROCEDURE — 96372 THER/PROPH/DIAG INJ SC/IM: CPT | Performed by: NURSE PRACTITIONER

## 2022-07-10 PROCEDURE — 85025 COMPLETE CBC W/AUTO DIFF WBC: CPT | Performed by: PHYSICIAN ASSISTANT

## 2022-07-10 PROCEDURE — 25000003 PHARM REV CODE 250: Performed by: PHYSICIAN ASSISTANT

## 2022-07-10 PROCEDURE — 96372 THER/PROPH/DIAG INJ SC/IM: CPT | Performed by: PHYSICIAN ASSISTANT

## 2022-07-10 PROCEDURE — 25000003 PHARM REV CODE 250: Performed by: NURSE PRACTITIONER

## 2022-07-10 PROCEDURE — 81001 URINALYSIS AUTO W/SCOPE: CPT | Performed by: PHYSICIAN ASSISTANT

## 2022-07-10 PROCEDURE — 80053 COMPREHEN METABOLIC PANEL: CPT | Performed by: PHYSICIAN ASSISTANT

## 2022-07-10 RX ORDER — KETOROLAC TROMETHAMINE 10 MG/1
10 TABLET, FILM COATED ORAL EVERY 6 HOURS
Qty: 20 TABLET | Refills: 0 | Status: SHIPPED | OUTPATIENT
Start: 2022-07-10 | End: 2022-07-15

## 2022-07-10 RX ORDER — HYDROCODONE BITARTRATE AND ACETAMINOPHEN 5; 325 MG/1; MG/1
1 TABLET ORAL EVERY 12 HOURS PRN
Qty: 6 TABLET | Refills: 0 | Status: SHIPPED | OUTPATIENT
Start: 2022-07-10 | End: 2022-07-13

## 2022-07-10 RX ORDER — METHOCARBAMOL 500 MG/1
1000 TABLET, FILM COATED ORAL
Status: COMPLETED | OUTPATIENT
Start: 2022-07-10 | End: 2022-07-10

## 2022-07-10 RX ORDER — DEXAMETHASONE SODIUM PHOSPHATE 4 MG/ML
8 INJECTION, SOLUTION INTRA-ARTICULAR; INTRALESIONAL; INTRAMUSCULAR; INTRAVENOUS; SOFT TISSUE
Status: COMPLETED | OUTPATIENT
Start: 2022-07-10 | End: 2022-07-10

## 2022-07-10 RX ORDER — HYDROCODONE BITARTRATE AND ACETAMINOPHEN 5; 325 MG/1; MG/1
1 TABLET ORAL
Status: COMPLETED | OUTPATIENT
Start: 2022-07-10 | End: 2022-07-10

## 2022-07-10 RX ORDER — METHOCARBAMOL 500 MG/1
1000 TABLET, FILM COATED ORAL 3 TIMES DAILY PRN
Qty: 30 TABLET | Refills: 0 | Status: SHIPPED | OUTPATIENT
Start: 2022-07-10 | End: 2022-07-15

## 2022-07-10 RX ORDER — KETOROLAC TROMETHAMINE 30 MG/ML
30 INJECTION, SOLUTION INTRAMUSCULAR; INTRAVENOUS
Status: COMPLETED | OUTPATIENT
Start: 2022-07-10 | End: 2022-07-10

## 2022-07-10 RX ADMIN — DEXAMETHASONE SODIUM PHOSPHATE 8 MG: 4 INJECTION, SOLUTION INTRAMUSCULAR; INTRAVENOUS at 04:07

## 2022-07-10 RX ADMIN — HYDROCODONE BITARTRATE AND ACETAMINOPHEN 1 TABLET: 5; 325 TABLET ORAL at 04:07

## 2022-07-10 RX ADMIN — KETOROLAC TROMETHAMINE 30 MG: 30 INJECTION, SOLUTION INTRAMUSCULAR; INTRAVENOUS at 01:07

## 2022-07-10 RX ADMIN — METHOCARBAMOL 1000 MG: 500 TABLET ORAL at 01:07

## 2022-07-10 NOTE — DISCHARGE INSTRUCTIONS
Heat to back. Take toradol for pain/inflammation, take with food. Take methocarbamol for muscle spasms/tightness. Norco for more severe pain, do not take and drive. Will call if covid or flu comes back +. Stay hydrated.

## 2022-07-10 NOTE — FIRST PROVIDER EVALUATION
Medical screening exam completed.  I have conducted a focused provider triage encounter, findings are as follows:    Brief history of present illness:  65 yo female presents to ED for evaluation of lower left back pain/spasms for the past 3 days. Subjective fever. Denies any dysuria or hematuria.     Vitals:    07/10/22 1230   BP: (!) 145/73   Pulse: 82   Resp: 16   Temp: 97.9 °F (36.6 °C)   TempSrc: Oral   SpO2: 97%   Weight: 81.6 kg (180 lb)       Pertinent physical exam:  Awake, alert and oriented     Brief workup plan:  Labs, UA, XR lumbar    Preliminary workup initiated; this workup will be continued and followed by the physician or advanced practice provider that is assigned to the patient when roomed.

## 2022-07-10 NOTE — ED PROVIDER NOTES
Encounter Date: 7/10/2022       History     Chief Complaint   Patient presents with    Back Pain     Pt presents c/o subjective fever with left lower back pain. Onset Friday.       65 y/o female who presents with c/o left side low back pain radiates up for 3 days now. She states she had a temp of 100 yesterday. No dysuria. Unknown of any specific injury/trauma which would have triggered pain but she does have back issues. Mild nausea. No cough/congestion.     The history is provided by the patient. No  was used.   Back Pain   This is a new problem. Illness onset: 3 days ago. The problem has been gradually worsening. The pain is associated with no known injury. The pain is present in the lumbar spine. The quality of the pain is described as stabbing. The pain is at a severity of 5/10. Associated symptoms include a fever. She has tried NSAIDs for the symptoms.     Review of patient's allergies indicates:   Allergen Reactions    Codeine Nausea And Vomiting     hyperemesis    Demerol [meperidine] Nausea And Vomiting     HYPEREMESIS    Tramadol Nausea Only and Nausea And Vomiting     Past Medical History:   Diagnosis Date    Arthritis 08/2017    LGSIL    Depression     Menopause     Thyroid disease     Vulvar atrophy 8/3/2021     Past Surgical History:   Procedure Laterality Date    AUGMENTATION OF BREAST      2014    HYSTERECTOMY  2014    ALLI/BSO for PMB.     NE REMOVAL OF OVARY/TUBE(S)      TONSILLECTOMY       Family History   Problem Relation Age of Onset    Hypertension Mother     Diabetes Maternal Grandmother     Heart disease Maternal Grandfather     Diabetes Maternal Grandfather     Ovarian cancer Neg Hx     Uterine cancer Neg Hx     Breast cancer Neg Hx     Colon cancer Neg Hx      Social History     Tobacco Use    Smoking status: Never Smoker    Smokeless tobacco: Never Used   Substance Use Topics    Alcohol use: Yes     Alcohol/week: 1.0 standard drink     Types: 1  Glasses of wine per week     Comment: OCC    Drug use: Never     Review of Systems   Constitutional: Positive for fever.   Musculoskeletal: Positive for back pain.   All other systems reviewed and are negative.      Physical Exam     Initial Vitals [07/10/22 1230]   BP Pulse Resp Temp SpO2   (!) 145/73 82 16 97.9 °F (36.6 °C) 97 %      MAP       --         Physical Exam    Nursing note and vitals reviewed.  Constitutional: She appears well-developed and well-nourished.   Eyes: Conjunctivae are normal.   Cardiovascular: Regular rhythm and intact distal pulses.   Pulmonary/Chest: Breath sounds normal. No respiratory distress.   Musculoskeletal:      Lumbar back: Tenderness present.        Back:       Comments: Bilateral UE and LE strength equal and strong. ambulatory     Neurological: She is alert and oriented to person, place, and time. She has normal strength.   Skin: Skin is warm and dry.   Psychiatric: She has a normal mood and affect.         ED Course   Procedures  Labs Reviewed   URINALYSIS, REFLEX TO URINE CULTURE - Abnormal; Notable for the following components:       Result Value    Leukocyte Esterase, UA Trace (*)     All other components within normal limits   CBC WITH DIFFERENTIAL - Abnormal; Notable for the following components:    MCHC 32.3 (*)     All other components within normal limits   URINALYSIS, MICROSCOPIC - Normal   CBC W/ AUTO DIFFERENTIAL    Narrative:     The following orders were created for panel order CBC auto differential.  Procedure                               Abnormality         Status                     ---------                               -----------         ------                     CBC with Differential[[id number]]        Abnormal            Final result                 Please view results for these tests on the individual orders.   COMPREHENSIVE METABOLIC PANEL   COVID/FLU A&B PCR          Imaging Results          X-Ray Lumbar Spine Ap And Lateral (Final result)  Result  time 07/10/22 12:57:20    Final result by Cathy Macias MD (07/10/22 12:57:20)                 Impression:      1. No acute abnormality identified.  2. Multilevel degenerative changes of the lumbar spine.      Electronically signed by: Cathy Macias  Date:    07/10/2022  Time:    12:57             Narrative:    EXAMINATION:  XR LUMBAR SPINE AP AND LATERAL    CLINICAL HISTORY:  low back pain;    COMPARISON:  X-rays dated 06/28/2021    FINDINGS:  There are 4 non-rib-bearing lumbar-type partial sacralization of L5.  There is grade 1 anterolisthesis of L4 over L5, unchanged.  The vertebral body heights are maintained.  There is mild multilevel disc height loss with marginal osteophyte formation.  Moderate to severe facet arthropathy is present in the lower lumbar spine.  The soft tissues are unremarkable.                                 Medications   ketorolac injection 30 mg (30 mg Intramuscular Given 7/10/22 1310)   methocarbamoL tablet 1,000 mg (1,000 mg Oral Given 7/10/22 1309)   dexamethasone injection 8 mg (8 mg Intramuscular Given 7/10/22 1630)   HYDROcodone-acetaminophen 5-325 mg per tablet 1 tablet (1 tablet Oral Given 7/10/22 1630)     Medical Decision Making:   Clinical Tests:   Lab Tests: Ordered and Reviewed  Radiological Study: Ordered and Reviewed  ED Management:  Lab work unremarkable. UA clean. Xray shows degenerative. Will covid swab. Medications given in ER and are helping.     Additional MDM:   Differential Diagnosis:   Other: The following diagnoses were also considered and will be evaluated: lumbar strain, covid19.                    Clinical Impression:   Final diagnoses:  [M54.50] Acute left-sided low back pain without sciatica (Primary)          ED Disposition Condition    Discharge Stable        ED Prescriptions     Medication Sig Dispense Start Date End Date Auth. Provider    ketorolac (TORADOL) 10 mg tablet Take 1 tablet (10 mg total) by mouth every 6 (six) hours. for 5 days 20  tablet 7/10/2022 7/15/2022 LONNIE Proctor    methocarbamoL (ROBAXIN) 500 MG Tab Take 2 tablets (1,000 mg total) by mouth 3 (three) times daily as needed (muscle spasms/tightness). 30 tablet 7/10/2022 7/15/2022 LONNIE Proctor    HYDROcodone-acetaminophen (NORCO) 5-325 mg per tablet Take 1 tablet by mouth every 12 (twelve) hours as needed for Pain. 6 tablet 7/10/2022 7/13/2022 LONNIE Proctor        Follow-up Information     Follow up With Specialties Details Why Contact Info    Angel Sher MD Internal Medicine Call in 1 week As needed, If symptoms worsen [address] [place] [id number]  [phone]             LONNIE Proctor  07/10/22 [id number]

## 2022-09-09 ENCOUNTER — OFFICE VISIT (OUTPATIENT)
Dept: URGENT CARE | Facility: CLINIC | Age: 67
End: 2022-09-09
Payer: MEDICARE

## 2022-09-09 VITALS
HEART RATE: 69 BPM | HEIGHT: 64 IN | TEMPERATURE: 99 F | BODY MASS INDEX: 31.58 KG/M2 | SYSTOLIC BLOOD PRESSURE: 161 MMHG | OXYGEN SATURATION: 97 % | WEIGHT: 185 LBS | DIASTOLIC BLOOD PRESSURE: 79 MMHG | RESPIRATION RATE: 20 BRPM

## 2022-09-09 DIAGNOSIS — L50.9 URTICARIA: Primary | ICD-10-CM

## 2022-09-09 PROCEDURE — 99214 OFFICE O/P EST MOD 30 MIN: CPT | Mod: ,,, | Performed by: FAMILY MEDICINE

## 2022-09-09 PROCEDURE — 99214 PR OFFICE/OUTPT VISIT, EST, LEVL IV, 30-39 MIN: ICD-10-PCS | Mod: ,,, | Performed by: FAMILY MEDICINE

## 2022-09-09 RX ORDER — PREDNISONE 20 MG/1
20 TABLET ORAL 2 TIMES DAILY
Qty: 6 TABLET | Refills: 0 | Status: SHIPPED | OUTPATIENT
Start: 2022-09-09 | End: 2022-09-12

## 2022-09-09 RX ORDER — TRIAMCINOLONE ACETONIDE 1 MG/G
CREAM TOPICAL 2 TIMES DAILY
Qty: 45 G | Refills: 1 | Status: SHIPPED | OUTPATIENT
Start: 2022-09-09 | End: 2022-10-27

## 2022-09-09 RX ORDER — ESTRADIOL 10 UG/1
1 INSERT VAGINAL
COMMUNITY
Start: 2022-08-10 | End: 2023-09-18

## 2022-09-09 NOTE — PATIENT INSTRUCTIONS
Continue antihistamine at bedtime.  Take prednisone with food. Cream to main bothersome areas. May also benefit from antacid like Mylanta.   ER for severe worsening.

## 2022-09-09 NOTE — PROGRESS NOTES
"Subjective:       Patient ID: Beronica Olguin is a 67 y.o. female.    Vitals:  height is 5' 4" (1.626 m) and weight is 83.9 kg (185 lb). Her oral temperature is 98.9 °F (37.2 °C). Her blood pressure is 161/79 (abnormal) and her pulse is 69. Her respiration is 20 and oxygen saturation is 97%.     Chief Complaint: Rash (Hives on trunk x 4 days, right wrist redness and swelling since last night)    Hives on trunk x 4 days, right wrist redness and swelling since last night.  No NVD, no fever, no SOB. No new foods, soaps, or detergents. Has had increase stress level with mother's illnesses.       Rash  Pertinent negatives include no congestion, cough or fever.   Constitution: Negative for fever.   HENT:  Negative for congestion.    Eyes:  Negative for eye redness.   Respiratory:  Negative for cough.    Genitourinary:  Negative for frequency and urgency.   Musculoskeletal:  Negative for pain.   Skin:  Positive for rash.   Neurological:  Negative for dizziness.     Objective:      Physical Exam   HENT:   Head: Normocephalic.   Nose: Nose normal.   Mouth/Throat: Mucous membranes are moist.   Cardiovascular: Normal rate.   Pulmonary/Chest: Effort normal.   Abdominal: Normal appearance.   Neurological: She is alert.   Skin:         Comments: Urticaria to belt line in patches and also spread throughout the abdomen. No fluctuance. Pos swelling to bite of the R wrist without fluctuance or exudate.    Nursing note and vitals reviewed.      Assessment:       1. Urticaria          Plan:         Urticaria  -     predniSONE (DELTASONE) 20 MG tablet; Take 1 tablet (20 mg total) by mouth 2 (two) times daily. for 3 days  Dispense: 6 tablet; Refill: 0  -     triamcinolone acetonide 0.1% (KENALOG) 0.1 % cream; Apply topically 2 (two) times daily.  Dispense: 45 g; Refill: 1                   "

## 2022-09-18 ENCOUNTER — HISTORICAL (OUTPATIENT)
Dept: ADMINISTRATIVE | Facility: HOSPITAL | Age: 67
End: 2022-09-18
Payer: MEDICARE

## 2022-10-27 ENCOUNTER — OFFICE VISIT (OUTPATIENT)
Dept: RHEUMATOLOGY | Facility: CLINIC | Age: 67
End: 2022-10-27
Payer: MEDICARE

## 2022-10-27 VITALS
SYSTOLIC BLOOD PRESSURE: 138 MMHG | HEART RATE: 95 BPM | WEIGHT: 187 LBS | OXYGEN SATURATION: 95 % | RESPIRATION RATE: 18 BRPM | TEMPERATURE: 98 F | BODY MASS INDEX: 31.92 KG/M2 | HEIGHT: 64 IN | DIASTOLIC BLOOD PRESSURE: 84 MMHG

## 2022-10-27 DIAGNOSIS — M79.7 FIBROMYALGIA SYNDROME: ICD-10-CM

## 2022-10-27 DIAGNOSIS — L50.9 HIVES: ICD-10-CM

## 2022-10-27 DIAGNOSIS — M19.90 ARTHRITIS: ICD-10-CM

## 2022-10-27 DIAGNOSIS — M06.00 SERONEGATIVE RHEUMATOID ARTHRITIS: Primary | ICD-10-CM

## 2022-10-27 DIAGNOSIS — F51.01 PRIMARY INSOMNIA: ICD-10-CM

## 2022-10-27 PROCEDURE — 99213 OFFICE O/P EST LOW 20 MIN: CPT | Mod: PBBFAC | Performed by: INTERNAL MEDICINE

## 2022-10-27 PROCEDURE — 99999 PR PBB SHADOW E&M-EST. PATIENT-LVL III: CPT | Mod: PBBFAC,,, | Performed by: INTERNAL MEDICINE

## 2022-10-27 PROCEDURE — 99999 PR PBB SHADOW E&M-EST. PATIENT-LVL III: ICD-10-PCS | Mod: PBBFAC,,, | Performed by: INTERNAL MEDICINE

## 2022-10-27 PROCEDURE — 99204 OFFICE O/P NEW MOD 45 MIN: CPT | Mod: S$PBB,,, | Performed by: INTERNAL MEDICINE

## 2022-10-27 PROCEDURE — 99204 PR OFFICE/OUTPT VISIT, NEW, LEVL IV, 45-59 MIN: ICD-10-PCS | Mod: S$PBB,,, | Performed by: INTERNAL MEDICINE

## 2022-10-27 RX ORDER — HYDROGEN PEROXIDE 3 %
20 SOLUTION, NON-ORAL MISCELLANEOUS
COMMUNITY
End: 2022-10-27

## 2022-10-27 RX ORDER — HYDROXYZINE HYDROCHLORIDE 10 MG/1
10 TABLET, FILM COATED ORAL NIGHTLY
Qty: 30 TABLET | Refills: 5 | Status: SHIPPED | OUTPATIENT
Start: 2022-10-27 | End: 2022-11-28

## 2022-10-27 RX ORDER — HYDROXYCHLOROQUINE SULFATE 200 MG/1
200 TABLET, FILM COATED ORAL 2 TIMES DAILY
Qty: 60 TABLET | Refills: 5 | Status: SHIPPED | OUTPATIENT
Start: 2022-10-27 | End: 2023-02-07

## 2022-10-27 RX ORDER — OMEPRAZOLE 40 MG/1
40 CAPSULE, DELAYED RELEASE ORAL EVERY MORNING
Qty: 30 CAPSULE | Refills: 11 | Status: SHIPPED | OUTPATIENT
Start: 2022-10-27 | End: 2023-02-07 | Stop reason: SDUPTHER

## 2022-10-27 NOTE — PROGRESS NOTES
"Subjective:       Patient ID: Beronica Olguin is a 67 y.o. female.    Chief Complaint: Establish Care (NP-Arthritis )    Pt  is complaining of joint pain involving his MCP PIP wrist elbow shoulders hips knees and ankles bilaterally.  Is 9/10 in intensity dull in quality and continuous.  It is associated with a morning stiffness lasting for more than 60 minutes. Pt reports having difficulty maintaining a good night of sleep and this has been associated with myalgia of 9/10 in intensity.  This pain is dull continuous and gets worse mainly at night.  It is associated with fatigue.  No fever no chills no others.        Review of Systems   Constitutional:  Negative for appetite change, chills and fever.   HENT:  Negative for congestion, ear pain, mouth sores, nosebleeds and trouble swallowing.    Eyes:  Negative for photophobia and discharge.   Respiratory:  Negative for chest tightness and shortness of breath.    Cardiovascular:  Negative for chest pain.   Gastrointestinal:  Negative for abdominal pain and vomiting.   Endocrine: Negative.    Genitourinary:  Negative for hematuria.   Musculoskeletal:         As per HPI   Skin:  Positive for rash.   Neurological:  Negative for weakness.       Objective:   /84 (BP Location: Right arm, Patient Position: Sitting, BP Method: Medium (Automatic))   Pulse 95   Temp 98.1 °F (36.7 °C) (Oral)   Resp 18   Ht 5' 4" (1.626 m)   Wt 84.8 kg (187 lb)   SpO2 95%   BMI 32.10 kg/m²      Physical Exam   Constitutional: She is oriented to person, place, and time. She appears well-developed and well-nourished. No distress.   HENT:   Head: Normocephalic and atraumatic.   Right Ear: External ear normal.   Left Ear: External ear normal.   Eyes: Pupils are equal, round, and reactive to light.   Cardiovascular: Normal rate, regular rhythm and normal heart sounds.   Pulmonary/Chest: Breath sounds normal.   Abdominal: Soft. There is no abdominal tenderness.   Musculoskeletal:      " Right shoulder: Tenderness present.      Left shoulder: Tenderness present.      Right elbow: Tenderness present.      Left elbow: Tenderness present.      Right wrist: Tenderness present.      Left wrist: Tenderness present.      Cervical back: Neck supple.      Right hip: Tenderness present.      Left hip: Tenderness present.      Right knee: Tenderness present.      Left knee: Tenderness present.      Right ankle: Tenderness present.      Left ankle: Tenderness present.   Lymphadenopathy:     She has no cervical adenopathy.   Neurological: She is alert and oriented to person, place, and time. She displays normal reflexes. No cranial nerve deficit or sensory deficit. She exhibits normal muscle tone. Coordination normal.   Skin: Rash noted. There is erythema.   Vitals reviewed.      Right Side Rheumatological Exam     The patient is tender to palpation of the shoulder, elbow, wrist, knee, 1st PIP, 1st MCP, 2nd PIP, 2nd MCP, 3rd PIP, 3rd MCP, 4th PIP, 4th MCP, 5th PIP, hip, ankle, 1st MTP, 2nd MTP, 3rd MTP, 4th MTP, 5th MTP, 1st toe IP, 2nd toe IP, 3rd toe IP, 4th toe IP and 5th toe IP    Left Side Rheumatological Exam     The patient is tender to palpation of the shoulder, elbow, wrist, knee, 1st PIP, 1st MCP, 2nd PIP, 2nd MCP, 3rd PIP, 3rd MCP, 4th PIP, 4th MCP, 5th PIP, 5th MCP, hip, ankle, 1st MTP, 2nd MTP, 3rd MTP, 4th MTP, 5th MTP, 1st toe IP, 2nd toe IP, 3rd toe IP, 4th toe IP and 5th toe IP.       Completed Fibromyalgia exam 18/18 tender points.  No data to display     Assessment:       1. Seronegative rheumatoid arthritis    2. Arthritis    3. Fibromyalgia syndrome    4. Primary insomnia    5. Hives            Medication List with Changes/Refills   New Medications    HYDROXYCHLOROQUINE (PLAQUENIL) 200 MG TABLET    Take 1 tablet (200 mg total) by mouth 2 (two) times daily. After food       Start Date: 10/27/2022End Date: 4/29/2023    HYDROXYZINE HCL (ATARAX) 10 MG TAB    Take 1 tablet (10 mg total) by mouth  nightly.       Start Date: 10/27/2022End Date: --    OMEPRAZOLE (PRILOSEC) 40 MG CAPSULE    Take 1 capsule (40 mg total) by mouth every morning.       Start Date: 10/27/2022End Date: 10/27/2023   Current Medications    ASPIRIN 81 MG CHEW    Take 81 mg by mouth once daily.       Start Date: --        End Date: --    CHOLECALCIFEROL, VITAMIN D3, 125 MCG (5,000 UNIT) TAB    Take 5,000 Units by mouth once daily.       Start Date: --        End Date: --    DESVENLAFAXINE SUCCINATE (PRISTIQ ORAL)    Take by mouth Daily.       Start Date: --        End Date: --    ESTRADIOL (VAGIFEM) 10 MCG TAB    Place 1 tablet vaginally twice a week.       Start Date: 8/10/2022 End Date: --    FLUOXETINE 40 MG CAPSULE    Take 40 mg by mouth.       Start Date: 9/21/2021 End Date: --    LEVOTHYROXINE (SYNTHROID) 50 MCG TABLET    TAKE ONE TABLET BY MOUTH ONCE DAILY       Start Date: 8/9/2022  End Date: --    PREMARIN 0.45 MG TABLET           Start Date: 2/18/2020 End Date: --   Discontinued Medications    ASPIRIN 81 MG CHEW    Take 81 mg by mouth.       Start Date: --        End Date: 10/27/2022    ESOMEPRAZOLE (NEXIUM) 20 MG CAPSULE    Take 20 mg by mouth before breakfast.       Start Date: --        End Date: 10/27/2022    ESOMEPRAZOLE (NEXIUM) 40 MG CAPSULE           Start Date: 2/16/2020 End Date: 10/27/2022    ESOMEPRAZOLE (NEXIUM) 40 MG CAPSULE    Take 40 mg by mouth.       Start Date: 3/4/2020  End Date: 10/27/2022    TRIAMCINOLONE ACETONIDE 0.1% (KENALOG) 0.1 % CREAM    Apply topically 2 (two) times daily.       Start Date: 9/9/2022  End Date: 10/27/2022    TRINTELLIX 20 MG TAB           Start Date: 2/16/2020 End Date: 10/27/2022         Plan:       Problem List Items Addressed This Visit    None  Visit Diagnoses       Seronegative rheumatoid arthritis    -  Primary    Relevant Orders    CBC Auto Differential    Comprehensive Metabolic Panel    CRP, High Sensitivity    Vitamin D    Rheumatoid Quantitative    Cyclic Citrullinated  Peptide Antibody, IgG    Antinuclear Ab, HEp-2 Substrate    Hepatitis B Surface Antigen    Hepatitis C Antibody    TSH    T4, Free    Arthritis        Relevant Orders    CBC Auto Differential    Comprehensive Metabolic Panel    CRP, High Sensitivity    Vitamin D    Rheumatoid Quantitative    Cyclic Citrullinated Peptide Antibody, IgG    Antinuclear Ab, HEp-2 Substrate    Hepatitis B Surface Antigen    Hepatitis C Antibody    TSH    T4, Free    Fibromyalgia syndrome        Relevant Orders    CBC Auto Differential    Comprehensive Metabolic Panel    CRP, High Sensitivity    Vitamin D    Rheumatoid Quantitative    Cyclic Citrullinated Peptide Antibody, IgG    Antinuclear Ab, HEp-2 Substrate    Hepatitis B Surface Antigen    Hepatitis C Antibody    TSH    T4, Free    Primary insomnia        Relevant Orders    CBC Auto Differential    Comprehensive Metabolic Panel    CRP, High Sensitivity    Vitamin D    Rheumatoid Quantitative    Cyclic Citrullinated Peptide Antibody, IgG    Antinuclear Ab, HEp-2 Substrate    Hepatitis B Surface Antigen    Hepatitis C Antibody    TSH    T4, Free    Hives        Relevant Orders    CBC Auto Differential    Comprehensive Metabolic Panel    CRP, High Sensitivity    Vitamin D    Rheumatoid Quantitative    Cyclic Citrullinated Peptide Antibody, IgG    Antinuclear Ab, HEp-2 Substrate    Hepatitis B Surface Antigen    Hepatitis C Antibody    TSH    T4, Free

## 2022-11-03 ENCOUNTER — TELEPHONE (OUTPATIENT)
Dept: RHEUMATOLOGY | Facility: CLINIC | Age: 67
End: 2022-11-03
Payer: MEDICARE

## 2022-11-03 NOTE — TELEPHONE ENCOUNTER
Please inform this patient that her labs are normal except for her elevated crp which means inflammation in the joints from rheumatoid arthritis. If the meds she is on are helping, that is great. If they are not we can add methotrexate once a week and folic acid once per day. This will be an add on and it will help. Thanks avis

## 2022-11-03 NOTE — TELEPHONE ENCOUNTER
----- Message from Cristiane Merino sent at 11/3/2022 11:14 AM CDT -----  Regarding: LAB RESULTS  PATIENT CALLED REQUESTING RESULTS FROM LAB WORK. [phone]

## 2022-11-03 NOTE — TELEPHONE ENCOUNTER
Patient wanted to be sure about her DX before stating other medications offered.  She states her SASHA was positive, so is it because of RA or immune disorder.  Please advise.

## 2022-11-26 PROBLEM — E03.9 HYPOTHYROID: Chronic | Status: ACTIVE | Noted: 2022-05-16

## 2022-11-28 ENCOUNTER — OFFICE VISIT (OUTPATIENT)
Dept: INTERNAL MEDICINE | Facility: CLINIC | Age: 67
End: 2022-11-28
Payer: MEDICARE

## 2022-11-28 VITALS
HEIGHT: 64 IN | BODY MASS INDEX: 32.5 KG/M2 | HEART RATE: 87 BPM | DIASTOLIC BLOOD PRESSURE: 78 MMHG | SYSTOLIC BLOOD PRESSURE: 128 MMHG | WEIGHT: 190.38 LBS | OXYGEN SATURATION: 97 %

## 2022-11-28 DIAGNOSIS — F32.A DEPRESSION, UNSPECIFIED DEPRESSION TYPE: ICD-10-CM

## 2022-11-28 DIAGNOSIS — E55.9 VITAMIN D DEFICIENCY: ICD-10-CM

## 2022-11-28 DIAGNOSIS — K21.9 GASTROESOPHAGEAL REFLUX DISEASE, UNSPECIFIED WHETHER ESOPHAGITIS PRESENT: ICD-10-CM

## 2022-11-28 DIAGNOSIS — M06.9 RHEUMATOID ARTHRITIS, INVOLVING UNSPECIFIED SITE, UNSPECIFIED WHETHER RHEUMATOID FACTOR PRESENT: ICD-10-CM

## 2022-11-28 DIAGNOSIS — L50.9 URTICARIA: Primary | ICD-10-CM

## 2022-11-28 PROCEDURE — 99213 OFFICE O/P EST LOW 20 MIN: CPT | Mod: ,,, | Performed by: INTERNAL MEDICINE

## 2022-11-28 PROCEDURE — 99213 PR OFFICE/OUTPT VISIT, EST, LEVL III, 20-29 MIN: ICD-10-PCS | Mod: ,,, | Performed by: INTERNAL MEDICINE

## 2022-11-28 RX ORDER — CETIRIZINE HYDROCHLORIDE 10 MG/1
10 TABLET ORAL DAILY
COMMUNITY
End: 2023-09-18

## 2022-11-28 RX ORDER — MINERAL OIL
180 ENEMA (ML) RECTAL DAILY
COMMUNITY
End: 2023-02-07

## 2022-11-28 RX ORDER — DESVENLAFAXINE SUCCINATE 25 MG/1
1 TABLET, EXTENDED RELEASE ORAL EVERY MORNING
COMMUNITY
Start: 2022-11-17 | End: 2023-10-18

## 2022-11-28 NOTE — PROGRESS NOTES
Angel Matos MD        PATIENT NAME: Beronica Olguin  : 1955  DATE: 22  MRN: [id number]      Billing Provider: Angel Matos MD  Level of Service: [place] OFFICE/OUTPT VISIT, EST, LEVL III, 20-29 MIN  Patient PCP Information       Provider PCP Type    Angel Matos MD General            Reason for Visit / Chief Complaint: Back Pain and chronic hives       Update PCP  Update Chief Complaint         History of Present Illness / Problem Focused Workflow     Beronica Olguin presents to the clinic with Back Pain and chronic hives     Beronica is here for 2 specific problems   With last few months she is developed a rash which was determined to be urticaria she is now seeing a rheumatologist who was diagnosed with rheumatoid arthritis   She also has back issues and would like to see Dr. Yazan Negrete the neurosurgeon and needs a referral      Review of Systems   Review of Systems   Constitutional: Negative.    HENT: Negative.     Eyes: Negative.    Respiratory: Negative.     Cardiovascular: Negative.    Gastrointestinal: Negative.    Endocrine: Negative.    Genitourinary: Negative.    Musculoskeletal:  Positive for back pain.   Integumentary:  Positive for rash.   Neurological: Negative.    Psychiatric/Behavioral: Negative.        Medical / Social / Family History     Past Medical History:   Diagnosis Date    Anxiety disorder, unspecified     Depression     GERD (gastroesophageal reflux disease)     Menopause     Mixed hyperlipidemia     Thyroid nodule     Vitamin D deficiency        Past Surgical History:   Procedure Laterality Date    APPENDECTOMY  2019    AUGMENTATION OF BREAST      2014    BREAST SURGERY  2002     SECTION  &    HYSTERECTOMY      ALLI/BSO for PMB.     [place] REMOVAL OF OVARY/TUBE(S)      TONSILLECTOMY         Social History  Ms. Olguin  reports that she has never smoked. She has never been exposed to tobacco smoke. She has never used smokeless tobacco. She reports  current alcohol use of about 1.0 standard drink per week. She reports that she does not use drugs.    Family History  Ms.'s Vige  family history includes COPD in her mother; Cancer in her mother; Depression in her mother; Diabetes in her maternal grandfather, maternal grandmother, mother, paternal grandmother, and sister; Heart disease in her maternal grandfather; Hypertension in her mother; Lung cancer in her mother.    Medications and Allergies     Medications  Outpatient Medications Marked as Taking for the 11/28/22 encounter (Office Visit) with Angel Sher MD   Medication Sig Dispense Refill    aspirin 81 MG Chew Take 81 mg by mouth once daily.      cetirizine (ZYRTEC) 10 MG tablet Take 10 mg by mouth once daily.      cholecalciferol, vitamin D3, 125 mcg (5,000 unit) Tab Take 5,000 Units by mouth once daily.      desvenlafaxine succinate (PRISTIQ) 25 mg Tb24 Take 1 tablet by mouth every morning.      estradioL (VAGIFEM) 10 mcg Tab Place 1 tablet vaginally twice a week.      fexofenadine (ALLEGRA) 180 MG tablet Take 180 mg by mouth once daily.      FLUoxetine 40 MG capsule Take 40 mg by mouth.      hydrOXYchloroQUINE (PLAQUENIL) 200 mg tablet Take 1 tablet (200 mg total) by mouth 2 (two) times daily. After food 60 tablet 5    levothyroxine (SYNTHROID) 50 MCG tablet TAKE ONE TABLET BY MOUTH ONCE DAILY 90 tablet 3    omeprazole (PRILOSEC) 40 MG capsule Take 1 capsule (40 mg total) by mouth every morning. 30 capsule 11    PREMARIN 0.45 mg tablet       [DISCONTINUED] desvenlafaxine succinate (PRISTIQ ORAL) Take by mouth Daily.         Allergies  Review of patient's allergies indicates:   Allergen Reactions    Codeine Nausea And Vomiting     hyperemesis    Demerol [meperidine] Nausea And Vomiting     HYPEREMESIS    Tramadol Nausea Only and Nausea And Vomiting       Physical Examination     Vitals:    11/28/22 1458   BP: 128/78   Pulse: 87     Physical Exam  Constitutional:       Appearance: Normal appearance.    HENT:      Head: Normocephalic and atraumatic.      Right Ear: Tympanic membrane normal.      Left Ear: Tympanic membrane normal.      Nose: Nose normal.      Mouth/Throat:      Mouth: Mucous membranes are moist.   Eyes:      Extraocular Movements: Extraocular movements intact.      Pupils: Pupils are equal, round, and reactive to light.   Cardiovascular:      Rate and Rhythm: Normal rate and regular rhythm.      Pulses: Normal pulses.   Pulmonary:      Effort: Pulmonary effort is normal.      Breath sounds: Normal breath sounds.   Abdominal:      General: Abdomen is flat. Bowel sounds are normal.      Palpations: Abdomen is soft.   Musculoskeletal:         General: Normal range of motion.      Cervical back: Normal range of motion and neck supple.   Skin:     General: Skin is warm and dry.   Neurological:      General: No focal deficit present.      Mental Status: She is alert and oriented to person, place, and time.   Psychiatric:         Mood and Affect: Mood normal.         Behavior: Behavior normal.        Assessment and Plan (including Health Maintenance)      Problem List  Smart Sets  Document Outside HM   :    Plan:   Urticaria    Depression, unspecified depression type    Vitamin D deficiency    Gastroesophageal reflux disease, unspecified whether esophagitis present    Rheumatoid arthritis, involving unspecified site, unspecified whether rheumatoid factor present     Referral made to Dr. Vaishali Suarez for evaluation of urticaria   Referral to Neurosurgery Dr. White for her back issues.           Health Maintenance Due   Topic Date Due    TETANUS VACCINE  Never done    Shingles Vaccine (1 of 2) Never done    Pneumococcal Vaccines (Age 65+) (1 - PCV) Never done    COVID-19 Vaccine (3 - Booster for Moderna series) 05/06/2021    Influenza Vaccine (1) 09/01/2022       Problem List Items Addressed This Visit          Psychiatric    Depression (Chronic)       Immunology/Multi System    Rheumatoid arthritis        Endocrine    Vitamin D deficiency       GI    GERD (gastroesophageal reflux disease)     Other Visit Diagnoses       Urticaria    -  Primary            Health Maintenance Topics with due status: Not Due       Topic Last Completion Date    Colorectal Cancer Screening 03/16/2018    DEXA Scan 07/26/2021    Lipid Panel 05/11/2022    Mammogram 07/11/2022       Future Appointments   Date Time Provider Department Center   2/3/2023  9:15 AM MD LEONARDO MckeonROSETTA [facility]   6/2/2023  8:40 AM Angel Sher MD [place] [id number]            Signature:  Angel Sher MD  [facility]  [address] [facility] [id number]    Date of encounter: 11/28/22

## 2022-12-06 ENCOUNTER — TELEPHONE (OUTPATIENT)
Dept: INTERNAL MEDICINE | Facility: CLINIC | Age: 67
End: 2022-12-06
Payer: MEDICARE

## 2022-12-06 NOTE — TELEPHONE ENCOUNTER
----- Message from Nevaeh Shi sent at 12/6/2022  9:30 AM CST -----  Regarding: Referral  Siddharth White office Neurosurgeon  [phone]     Called and stated a referral was sent to them on pt and needs any imaging MRI and or Ct you can call her at number above or can fax to [phone] .. please advise

## 2022-12-21 ENCOUNTER — TELEPHONE (OUTPATIENT)
Dept: INTERNAL MEDICINE | Facility: CLINIC | Age: 67
End: 2022-12-21
Payer: MEDICARE

## 2022-12-21 DIAGNOSIS — M54.9 DORSALGIA, UNSPECIFIED: Primary | ICD-10-CM

## 2022-12-28 ENCOUNTER — PATIENT MESSAGE (OUTPATIENT)
Dept: INTERNAL MEDICINE | Facility: CLINIC | Age: 67
End: 2022-12-28
Payer: MEDICARE

## 2022-12-29 NOTE — TELEPHONE ENCOUNTER
MRI Lumbar requested prior to Initial Neurosurgery Visit.  Abnormal MRI Lumbar reviewed will notify patient and have faxed to her Neurosurgeon for her initial visit.

## 2023-02-07 ENCOUNTER — OFFICE VISIT (OUTPATIENT)
Dept: RHEUMATOLOGY | Facility: CLINIC | Age: 68
End: 2023-02-07
Payer: MEDICARE

## 2023-02-07 VITALS
DIASTOLIC BLOOD PRESSURE: 73 MMHG | TEMPERATURE: 99 F | BODY MASS INDEX: 31.82 KG/M2 | OXYGEN SATURATION: 96 % | HEIGHT: 64 IN | SYSTOLIC BLOOD PRESSURE: 111 MMHG | WEIGHT: 186.38 LBS | HEART RATE: 73 BPM

## 2023-02-07 DIAGNOSIS — K21.9 GASTROESOPHAGEAL REFLUX DISEASE, UNSPECIFIED WHETHER ESOPHAGITIS PRESENT: ICD-10-CM

## 2023-02-07 DIAGNOSIS — M06.00 SERONEGATIVE RHEUMATOID ARTHRITIS: Primary | ICD-10-CM

## 2023-02-07 DIAGNOSIS — E03.9 HYPOTHYROIDISM, UNSPECIFIED TYPE: Chronic | ICD-10-CM

## 2023-02-07 DIAGNOSIS — M48.062 SPINAL STENOSIS OF LUMBAR REGION WITH NEUROGENIC CLAUDICATION: ICD-10-CM

## 2023-02-07 DIAGNOSIS — E55.9 VITAMIN D DEFICIENCY: ICD-10-CM

## 2023-02-07 DIAGNOSIS — F32.A DEPRESSION, UNSPECIFIED DEPRESSION TYPE: Chronic | ICD-10-CM

## 2023-02-07 PROCEDURE — 99999 PR PBB SHADOW E&M-EST. PATIENT-LVL III: CPT | Mod: PBBFAC,,, | Performed by: INTERNAL MEDICINE

## 2023-02-07 PROCEDURE — 99213 OFFICE O/P EST LOW 20 MIN: CPT | Mod: PBBFAC | Performed by: INTERNAL MEDICINE

## 2023-02-07 PROCEDURE — 99214 PR OFFICE/OUTPT VISIT, EST, LEVL IV, 30-39 MIN: ICD-10-PCS | Mod: S$PBB,,, | Performed by: INTERNAL MEDICINE

## 2023-02-07 PROCEDURE — 99214 OFFICE O/P EST MOD 30 MIN: CPT | Mod: S$PBB,,, | Performed by: INTERNAL MEDICINE

## 2023-02-07 PROCEDURE — 99999 PR PBB SHADOW E&M-EST. PATIENT-LVL III: ICD-10-PCS | Mod: PBBFAC,,, | Performed by: INTERNAL MEDICINE

## 2023-02-07 RX ORDER — DICLOFENAC SODIUM 50 MG/1
50 TABLET, DELAYED RELEASE ORAL 2 TIMES DAILY PRN
Qty: 60 TABLET | Refills: 5 | Status: SHIPPED | OUTPATIENT
Start: 2023-02-07 | End: 2023-10-18

## 2023-02-07 RX ORDER — GABAPENTIN 100 MG/1
100 CAPSULE ORAL 2 TIMES DAILY
Qty: 60 CAPSULE | Refills: 5 | Status: SHIPPED | OUTPATIENT
Start: 2023-02-07 | End: 2023-06-02

## 2023-02-07 RX ORDER — FOLIC ACID 1 MG/1
1 TABLET ORAL DAILY
Qty: 30 TABLET | Refills: 5 | Status: SHIPPED | OUTPATIENT
Start: 2023-02-07 | End: 2023-06-07

## 2023-02-07 RX ORDER — OMEPRAZOLE 40 MG/1
40 CAPSULE, DELAYED RELEASE ORAL EVERY MORNING
Qty: 30 CAPSULE | Refills: 11 | Status: SHIPPED | OUTPATIENT
Start: 2023-02-07 | End: 2023-10-13 | Stop reason: SDUPTHER

## 2023-02-07 RX ORDER — TIZANIDINE 4 MG/1
4 TABLET ORAL NIGHTLY
Qty: 30 TABLET | Refills: 5 | Status: SHIPPED | OUTPATIENT
Start: 2023-02-07 | End: 2023-06-02

## 2023-02-07 RX ORDER — METHOTREXATE 2.5 MG/1
10 TABLET ORAL
Qty: 20 TABLET | Refills: 5 | Status: SHIPPED | OUTPATIENT
Start: 2023-02-07 | End: 2023-03-10

## 2023-02-07 NOTE — PROGRESS NOTES
"Subjective:       Patient ID: Beronica Olguin is a 67 y.o. female.    Chief Complaint: Follow-up (Follow up RA. Patient states that after starting the Plaquenil she started having eye pain and blurred vision so she stopped it. She is not on anything currently and having a lot of joint pain. Pain is 6/10)    The patient is complaining of joint pain involving the MCP PIP wrist elbow shoulders hips knees and ankles bilaterally.  The pain is 6/10 in intensity dull in quality and continuous.  That is associated with a morning stiffness lasting for more than 60 minutes.  Is also having difficulty maintaining a good night of sleep.  This has been associated with myalgias.  Muscle aches are 5/10 in intensity dull in quality and continuous.  They are associated with fatigue.  No fever no chills no others.  MRI L/S SPINAL STENOSIS     Review of Systems   Constitutional:  Negative for appetite change, chills and fever.   HENT:  Negative for congestion, ear pain, mouth sores, nosebleeds and trouble swallowing.    Eyes:  Negative for photophobia and discharge.   Respiratory:  Negative for chest tightness and shortness of breath.    Cardiovascular:  Negative for chest pain.   Gastrointestinal:  Negative for abdominal pain and vomiting.   Endocrine: Negative.    Genitourinary:  Negative for hematuria.   Musculoskeletal:         As per HPI   Skin:  Negative for rash.   Neurological:  Negative for weakness.       Objective:   /73 (BP Location: Left arm, Patient Position: Sitting, BP Method: Medium (Automatic))   Pulse 73   Temp 98.7 °F (37.1 °C) (Oral)   Ht 5' 4" (1.626 m)   Wt 84.6 kg (186 lb 6.4 oz)   SpO2 96%   BMI 32.00 kg/m²      Physical Exam   Constitutional: She is oriented to person, place, and time. She appears well-developed and well-nourished. No distress.   HENT:   Head: Normocephalic and atraumatic.   Right Ear: External ear normal.   Left Ear: External ear normal.   Eyes: Pupils are equal, round, and " reactive to light.   Cardiovascular: Normal rate, regular rhythm and normal heart sounds.   Pulmonary/Chest: Breath sounds normal.   Abdominal: Soft. There is no abdominal tenderness.   Musculoskeletal:      Right shoulder: Tenderness present.      Left shoulder: Tenderness present.      Right elbow: Tenderness present.      Left elbow: Tenderness present.      Right wrist: Tenderness present.      Left wrist: Tenderness present.      Cervical back: Neck supple.      Right hip: Tenderness present.      Left hip: Tenderness present.      Right knee: Tenderness present.      Left knee: Tenderness present.      Right ankle: Tenderness present.      Left ankle: Tenderness present.   Lymphadenopathy:     She has no cervical adenopathy.   Neurological: She is alert and oriented to person, place, and time. She displays normal reflexes. No cranial nerve deficit or sensory deficit. She exhibits normal muscle tone. Coordination normal.   Skin: No rash noted. No erythema.   Vitals reviewed.      Right Side Rheumatological Exam     The patient is tender to palpation of the shoulder, elbow, wrist, knee, 1st PIP, 1st MCP, 2nd PIP, 2nd MCP, 3rd PIP, 3rd MCP, 4th PIP, 4th MCP, 5th PIP, hip, ankle, 1st MTP, 2nd MTP, 3rd MTP, 4th MTP, 5th MTP, 1st toe IP, 2nd toe IP, 3rd toe IP, 4th toe IP and 5th toe IP    Left Side Rheumatological Exam     The patient is tender to palpation of the shoulder, elbow, wrist, knee, 1st PIP, 1st MCP, 2nd PIP, 2nd MCP, 3rd PIP, 3rd MCP, 4th PIP, 4th MCP, 5th PIP, 5th MCP, hip, ankle, 1st MTP, 2nd MTP, 3rd MTP, 4th MTP, 5th MTP, 1st toe IP, 2nd toe IP, 3rd toe IP, 4th toe IP and 5th toe IP.       Completed Fibromyalgia exam 18/18 tender points.  No data to display     Assessment:       1. Seronegative rheumatoid arthritis    2. Hypothyroidism, unspecified type    3. Vitamin D deficiency    4. Gastroesophageal reflux disease, unspecified whether esophagitis present    5. Depression, unspecified depression  type    6. Spinal stenosis of lumbar region with neurogenic claudication            Medication List with Changes/Refills   New Medications    DICLOFENAC (VOLTAREN) 50 MG EC TABLET    Take 1 tablet (50 mg total) by mouth 2 (two) times daily as needed (pain, after food).       Start Date: 2/7/2023  End Date: --    FOLIC ACID (FOLVITE) 1 MG TABLET    Take 1 tablet (1 mg total) by mouth once daily. After food       Start Date: 2/7/2023  End Date: 8/6/2023    GABAPENTIN (NEURONTIN) 100 MG CAPSULE    Take 1 capsule (100 mg total) by mouth 2 (two) times daily.       Start Date: 2/7/2023  End Date: 2/7/2024    METHOTREXATE 2.5 MG TAB    Take 4 tablets (10 mg total) by mouth every 7 days. EVERY        TAKE 4 TAB TOGETHER AFTER SUPPER       Start Date: 2/7/2023  End Date: 8/6/2023    TIZANIDINE (ZANAFLEX) 4 MG TABLET    Take 1 tablet (4 mg total) by mouth nightly.       Start Date: 2/7/2023  End Date: 8/6/2023   Current Medications    ASPIRIN 81 MG CHEW    Take 81 mg by mouth once daily.       Start Date: --        End Date: --    CETIRIZINE (ZYRTEC) 10 MG TABLET    Take 10 mg by mouth once daily.       Start Date: --        End Date: --    CHOLECALCIFEROL, VITAMIN D3, 125 MCG (5,000 UNIT) TAB    Take 5,000 Units by mouth once daily.       Start Date: --        End Date: --    DESVENLAFAXINE SUCCINATE (PRISTIQ) 25 MG TB24    Take 1 tablet by mouth every morning.       Start Date: 11/17/2022End Date: --    ESTRADIOL (VAGIFEM) 10 MCG TAB    Place 1 tablet vaginally twice a week.       Start Date: 8/10/2022 End Date: --    FLUOXETINE 40 MG CAPSULE    Take 40 mg by mouth.       Start Date: 9/21/2021 End Date: --    LEVOTHYROXINE (SYNTHROID) 50 MCG TABLET    TAKE ONE TABLET BY MOUTH ONCE DAILY       Start Date: 8/9/2022  End Date: --    PREMARIN 0.45 MG TABLET    once daily.       Start Date: 2/18/2020 End Date: --   Changed and/or Refilled Medications    Modified Medication Previous Medication    OMEPRAZOLE (PRILOSEC) 40 MG  CAPSULE omeprazole (PRILOSEC) 40 MG capsule       Take 1 capsule (40 mg total) by mouth every morning.    Take 1 capsule (40 mg total) by mouth every morning.       Start Date: 2/7/2023  End Date: 2/7/2024    Start Date: 10/27/2022End Date: 2/7/2023   Discontinued Medications    FEXOFENADINE (ALLEGRA) 180 MG TABLET    Take 180 mg by mouth once daily.       Start Date: --        End Date: 2/7/2023    HYDROXYCHLOROQUINE (PLAQUENIL) 200 MG TABLET    Take 1 tablet (200 mg total) by mouth 2 (two) times daily. After food       Start Date: 10/27/2022End Date: 2/7/2023         Plan:         Problem List Items Addressed This Visit          Psychiatric    Depression (Chronic)    Relevant Medications    omeprazole (PRILOSEC) 40 MG capsule    gabapentin (NEURONTIN) 100 MG capsule    folic acid (FOLVITE) 1 MG tablet    methotrexate 2.5 MG Tab    tiZANidine (ZANAFLEX) 4 MG tablet    diclofenac (VOLTAREN) 50 MG EC tablet       Immunology/Multi System    Seronegative rheumatoid arthritis - Primary    Relevant Medications    omeprazole (PRILOSEC) 40 MG capsule    gabapentin (NEURONTIN) 100 MG capsule    folic acid (FOLVITE) 1 MG tablet    methotrexate 2.5 MG Tab    tiZANidine (ZANAFLEX) 4 MG tablet    diclofenac (VOLTAREN) 50 MG EC tablet       Endocrine    Hypothyroid (Chronic)    Relevant Medications    omeprazole (PRILOSEC) 40 MG capsule    gabapentin (NEURONTIN) 100 MG capsule    folic acid (FOLVITE) 1 MG tablet    methotrexate 2.5 MG Tab    tiZANidine (ZANAFLEX) 4 MG tablet    diclofenac (VOLTAREN) 50 MG EC tablet    Vitamin D deficiency    Relevant Medications    omeprazole (PRILOSEC) 40 MG capsule    gabapentin (NEURONTIN) 100 MG capsule    folic acid (FOLVITE) 1 MG tablet    methotrexate 2.5 MG Tab    tiZANidine (ZANAFLEX) 4 MG tablet    diclofenac (VOLTAREN) 50 MG EC tablet       GI    GERD (gastroesophageal reflux disease)    Relevant Medications    omeprazole (PRILOSEC) 40 MG capsule    gabapentin (NEURONTIN) 100 MG  capsule    folic acid (FOLVITE) 1 MG tablet    methotrexate 2.5 MG Tab    tiZANidine (ZANAFLEX) 4 MG tablet    diclofenac (VOLTAREN) 50 MG EC tablet     Other Visit Diagnoses       Spinal stenosis of lumbar region with neurogenic claudication        Relevant Medications    omeprazole (PRILOSEC) 40 MG capsule    gabapentin (NEURONTIN) 100 MG capsule    folic acid (FOLVITE) 1 MG tablet    methotrexate 2.5 MG Tab    tiZANidine (ZANAFLEX) 4 MG tablet    diclofenac (VOLTAREN) 50 MG EC tablet

## 2023-03-09 ENCOUNTER — PATIENT MESSAGE (OUTPATIENT)
Dept: RHEUMATOLOGY | Facility: CLINIC | Age: 68
End: 2023-03-09
Payer: MEDICARE

## 2023-03-09 DIAGNOSIS — M06.00 SERONEGATIVE RHEUMATOID ARTHRITIS: Primary | ICD-10-CM

## 2023-03-10 RX ORDER — LEFLUNOMIDE 20 MG/1
20 TABLET ORAL
Qty: 30 TABLET | Refills: 11 | Status: SHIPPED | OUTPATIENT
Start: 2023-03-10 | End: 2023-06-07

## 2023-03-10 NOTE — TELEPHONE ENCOUNTER
Four weeks is enough time for methotrexate to start working.  If there is no improvement we can stop the methotrexate, keep the folic acid, and start leflunomide 1 tablet once daily after supper instead of the methotrexate.  We can keep all the other medicines as well.  I called and the new medicine for her.  Thank you BPH

## 2023-04-25 NOTE — TELEPHONE ENCOUNTER
MRI lumbar spine required prior to initial Neurosurgery visit ordered.  Will have staff notify to complete at [facility].    Orders Placed This Encounter    MRI Lumbar Spine Without Contrast        None

## 2023-05-18 DIAGNOSIS — E03.9 HYPOTHYROIDISM, UNSPECIFIED TYPE: Primary | Chronic | ICD-10-CM

## 2023-05-18 RX ORDER — LEVOTHYROXINE SODIUM 50 UG/1
TABLET ORAL
Qty: 90 TABLET | Refills: 3 | Status: SHIPPED | OUTPATIENT
Start: 2023-05-18

## 2023-05-23 ENCOUNTER — PATIENT MESSAGE (OUTPATIENT)
Dept: RESEARCH | Facility: HOSPITAL | Age: 68
End: 2023-05-23
Payer: MEDICARE

## 2023-05-31 DIAGNOSIS — I10 HYPERTENSION, UNSPECIFIED TYPE: ICD-10-CM

## 2023-05-31 DIAGNOSIS — R30.0 DYSURIA: ICD-10-CM

## 2023-05-31 DIAGNOSIS — E11.9 TYPE 2 DIABETES MELLITUS WITHOUT COMPLICATION, UNSPECIFIED WHETHER LONG TERM INSULIN USE: Primary | ICD-10-CM

## 2023-05-31 DIAGNOSIS — E78.5 HYPERLIPIDEMIA, UNSPECIFIED HYPERLIPIDEMIA TYPE: ICD-10-CM

## 2023-05-31 DIAGNOSIS — E03.9 HYPOTHYROIDISM, UNSPECIFIED TYPE: ICD-10-CM

## 2023-05-31 DIAGNOSIS — Z00.00 WELLNESS EXAMINATION: ICD-10-CM

## 2023-06-02 ENCOUNTER — LAB VISIT (OUTPATIENT)
Dept: LAB | Facility: HOSPITAL | Age: 68
End: 2023-06-02
Attending: INTERNAL MEDICINE
Payer: MEDICARE

## 2023-06-02 ENCOUNTER — OFFICE VISIT (OUTPATIENT)
Dept: INTERNAL MEDICINE | Facility: CLINIC | Age: 68
End: 2023-06-02
Payer: MEDICARE

## 2023-06-02 VITALS
HEART RATE: 69 BPM | HEIGHT: 64 IN | OXYGEN SATURATION: 98 % | BODY MASS INDEX: 31.04 KG/M2 | SYSTOLIC BLOOD PRESSURE: 140 MMHG | WEIGHT: 181.81 LBS | DIASTOLIC BLOOD PRESSURE: 80 MMHG

## 2023-06-02 DIAGNOSIS — F32.A DEPRESSION, UNSPECIFIED DEPRESSION TYPE: Chronic | ICD-10-CM

## 2023-06-02 DIAGNOSIS — Z23 NEED FOR PNEUMOCOCCAL VACCINATION: ICD-10-CM

## 2023-06-02 DIAGNOSIS — E03.9 HYPOTHYROIDISM, UNSPECIFIED TYPE: ICD-10-CM

## 2023-06-02 DIAGNOSIS — E55.9 VITAMIN D DEFICIENCY: Chronic | ICD-10-CM

## 2023-06-02 DIAGNOSIS — R30.0 DYSURIA: ICD-10-CM

## 2023-06-02 DIAGNOSIS — F41.9 ANXIETY DISORDER, UNSPECIFIED TYPE: ICD-10-CM

## 2023-06-02 DIAGNOSIS — Z00.00 WELLNESS EXAMINATION: Primary | ICD-10-CM

## 2023-06-02 DIAGNOSIS — E03.9 HYPOTHYROIDISM, UNSPECIFIED TYPE: Chronic | ICD-10-CM

## 2023-06-02 DIAGNOSIS — E11.9 TYPE 2 DIABETES MELLITUS WITHOUT COMPLICATION, UNSPECIFIED WHETHER LONG TERM INSULIN USE: ICD-10-CM

## 2023-06-02 DIAGNOSIS — Z13.6 ENCOUNTER FOR SCREENING FOR CARDIOVASCULAR DISORDERS: ICD-10-CM

## 2023-06-02 DIAGNOSIS — E78.5 HYPERLIPIDEMIA, UNSPECIFIED HYPERLIPIDEMIA TYPE: ICD-10-CM

## 2023-06-02 DIAGNOSIS — Z00.00 WELLNESS EXAMINATION: ICD-10-CM

## 2023-06-02 DIAGNOSIS — K21.9 GASTROESOPHAGEAL REFLUX DISEASE, UNSPECIFIED WHETHER ESOPHAGITIS PRESENT: Chronic | ICD-10-CM

## 2023-06-02 DIAGNOSIS — M06.00 SERONEGATIVE RHEUMATOID ARTHRITIS: ICD-10-CM

## 2023-06-02 DIAGNOSIS — I10 HYPERTENSION, UNSPECIFIED TYPE: ICD-10-CM

## 2023-06-02 LAB
ALBUMIN SERPL-MCNC: 3.8 G/DL (ref 3.4–4.8)
ALBUMIN/GLOB SERPL: 1.6 RATIO (ref 1.1–2)
ALP SERPL-CCNC: 69 UNIT/L (ref 40–150)
ALT SERPL-CCNC: 11 UNIT/L (ref 0–55)
APPEARANCE UR: CLEAR
AST SERPL-CCNC: 15 UNIT/L (ref 5–34)
BACTERIA #/AREA URNS AUTO: NORMAL /HPF
BASOPHILS # BLD AUTO: 0.07 X10(3)/MCL
BASOPHILS NFR BLD AUTO: 1.5 %
BILIRUB UR QL STRIP.AUTO: NEGATIVE MG/DL
BILIRUBIN DIRECT+TOT PNL SERPL-MCNC: 0.5 MG/DL
BUN SERPL-MCNC: 11.5 MG/DL (ref 9.8–20.1)
CALCIUM SERPL-MCNC: 8.9 MG/DL (ref 8.4–10.2)
CHLORIDE SERPL-SCNC: 106 MMOL/L (ref 98–107)
CHOLEST SERPL-MCNC: 280 MG/DL
CHOLEST/HDLC SERPL: 4 {RATIO} (ref 0–5)
CO2 SERPL-SCNC: 29 MMOL/L (ref 23–31)
COLOR UR: YELLOW
CREAT SERPL-MCNC: 0.79 MG/DL (ref 0.55–1.02)
EOSINOPHIL # BLD AUTO: 0.21 X10(3)/MCL (ref 0–0.9)
EOSINOPHIL NFR BLD AUTO: 4.4 %
ERYTHROCYTE [DISTWIDTH] IN BLOOD BY AUTOMATED COUNT: 13.5 % (ref 11.5–17)
EST. AVERAGE GLUCOSE BLD GHB EST-MCNC: 108.3 MG/DL
GFR SERPLBLD CREATININE-BSD FMLA CKD-EPI: >60 MLS/MIN/1.73/M2
GLOBULIN SER-MCNC: 2.4 GM/DL (ref 2.4–3.5)
GLUCOSE SERPL-MCNC: 112 MG/DL (ref 82–115)
GLUCOSE UR QL STRIP.AUTO: NEGATIVE MG/DL
HBA1C MFR BLD: 5.4 %
HCT VFR BLD AUTO: 41.8 % (ref 37–47)
HDLC SERPL-MCNC: 69 MG/DL (ref 35–60)
HGB BLD-MCNC: 13.3 G/DL (ref 12–16)
IMM GRANULOCYTES # BLD AUTO: 0.02 X10(3)/MCL (ref 0–0.04)
IMM GRANULOCYTES NFR BLD AUTO: 0.4 %
KETONES UR QL STRIP.AUTO: NEGATIVE MG/DL
LDLC SERPL CALC-MCNC: 176 MG/DL (ref 50–140)
LEUKOCYTE ESTERASE UR QL STRIP.AUTO: NEGATIVE UNIT/L
LYMPHOCYTES # BLD AUTO: 1.56 X10(3)/MCL (ref 0.6–4.6)
LYMPHOCYTES NFR BLD AUTO: 32.7 %
MCH RBC QN AUTO: 28.8 PG (ref 27–31)
MCHC RBC AUTO-ENTMCNC: 31.8 G/DL (ref 33–36)
MCV RBC AUTO: 90.5 FL (ref 80–94)
MONOCYTES # BLD AUTO: 0.48 X10(3)/MCL (ref 0.1–1.3)
MONOCYTES NFR BLD AUTO: 10.1 %
NEUTROPHILS # BLD AUTO: 2.43 X10(3)/MCL (ref 2.1–9.2)
NEUTROPHILS NFR BLD AUTO: 50.9 %
NITRITE UR QL STRIP.AUTO: NEGATIVE
NRBC BLD AUTO-RTO: 0 %
PH UR STRIP.AUTO: 6.5 [PH]
PLATELET # BLD AUTO: 233 X10(3)/MCL (ref 130–400)
PMV BLD AUTO: 9.4 FL (ref 7.4–10.4)
POTASSIUM SERPL-SCNC: 4.7 MMOL/L (ref 3.5–5.1)
PROT SERPL-MCNC: 6.2 GM/DL (ref 5.8–7.6)
PROT UR QL STRIP.AUTO: NEGATIVE MG/DL
RBC # BLD AUTO: 4.62 X10(6)/MCL (ref 4.2–5.4)
RBC #/AREA URNS AUTO: <5 /HPF
RBC UR QL AUTO: NEGATIVE UNIT/L
SODIUM SERPL-SCNC: 142 MMOL/L (ref 136–145)
SP GR UR STRIP.AUTO: 1.01 (ref 1–1.03)
SQUAMOUS #/AREA URNS AUTO: <5 /HPF
TRIGL SERPL-MCNC: 174 MG/DL (ref 37–140)
TSH SERPL-ACNC: 1.74 UIU/ML (ref 0.35–4.94)
UROBILINOGEN UR STRIP-ACNC: 0.2 MG/DL
VLDLC SERPL CALC-MCNC: 35 MG/DL
WBC # SPEC AUTO: 4.77 X10(3)/MCL (ref 4.5–11.5)
WBC #/AREA URNS AUTO: <5 /HPF

## 2023-06-02 PROCEDURE — 85025 COMPLETE CBC W/AUTO DIFF WBC: CPT

## 2023-06-02 PROCEDURE — 80053 COMPREHEN METABOLIC PANEL: CPT

## 2023-06-02 PROCEDURE — 84443 ASSAY THYROID STIM HORMONE: CPT

## 2023-06-02 PROCEDURE — G0439 PR MEDICARE ANNUAL WELLNESS SUBSEQUENT VISIT: ICD-10-PCS | Mod: ,,, | Performed by: INTERNAL MEDICINE

## 2023-06-02 PROCEDURE — G0009 ADMIN PNEUMOCOCCAL VACCINE: HCPCS | Mod: ,,, | Performed by: INTERNAL MEDICINE

## 2023-06-02 PROCEDURE — 90677 PNEUMOCOCCAL CONJUGATE VACCINE 20-VALENT: ICD-10-PCS | Mod: ,,, | Performed by: INTERNAL MEDICINE

## 2023-06-02 PROCEDURE — 83036 HEMOGLOBIN GLYCOSYLATED A1C: CPT

## 2023-06-02 PROCEDURE — 36415 COLL VENOUS BLD VENIPUNCTURE: CPT

## 2023-06-02 PROCEDURE — 81001 URINALYSIS AUTO W/SCOPE: CPT

## 2023-06-02 PROCEDURE — G0439 PPPS, SUBSEQ VISIT: HCPCS | Mod: ,,, | Performed by: INTERNAL MEDICINE

## 2023-06-02 PROCEDURE — 80061 LIPID PANEL: CPT

## 2023-06-02 PROCEDURE — 90677 PCV20 VACCINE IM: CPT | Mod: ,,, | Performed by: INTERNAL MEDICINE

## 2023-06-02 PROCEDURE — G0009 PNEUMOCOCCAL CONJUGATE VACCINE 20-VALENT: ICD-10-PCS | Mod: ,,, | Performed by: INTERNAL MEDICINE

## 2023-06-02 NOTE — PATIENT INSTRUCTIONS
Low-carbohydrate diet.  Goal 60 g of carbohydrates per day or less.  20 g of carbohydrate per meal.  Journal your carbohydrate intake daily.  You can do this by going to [employer] on your phone and enter the the food name and carbs.  Hit enter and you will see the carbohydrate value for each food.  Example Banana carbs = 27 grams of carbs.  [place]=12.  You do not have to do this for the protein family as proteins have no carbohydrates.  Proteins are meat, chicken, seafood, and eggs.      Your body needs energy to function daily.  The energy comes from oxygen and blood sugar.  Blood sugar is generated by carbohydrate intake daily.  When the body exceeds 20 g of carbohydrates per meal it makes insulin to process the excess carbohydrates.  Insulin stores the excess carbohydrates his body fat.  As long his insulin is present body fat can't be released because insulin is a storage hormone.  When eat less than 20 grams of carbohydrates your  body will mobilize fat to create ketones for energy.  You should eat more protein and good carbohydrates, green and vegetables.  This will accomplish a slow steady weight loss over the following weeks.      This is not a diet but a lifestyle.  Once you know the carbohydrate content of your diet it will be easy for you to make the right choices for your health

## 2023-06-07 ENCOUNTER — OFFICE VISIT (OUTPATIENT)
Dept: RHEUMATOLOGY | Facility: CLINIC | Age: 68
End: 2023-06-07
Payer: MEDICARE

## 2023-06-07 VITALS
BODY MASS INDEX: 30.16 KG/M2 | SYSTOLIC BLOOD PRESSURE: 120 MMHG | DIASTOLIC BLOOD PRESSURE: 71 MMHG | HEIGHT: 64 IN | HEART RATE: 81 BPM | TEMPERATURE: 99 F | WEIGHT: 176.63 LBS | OXYGEN SATURATION: 98 %

## 2023-06-07 DIAGNOSIS — F32.A DEPRESSION, UNSPECIFIED DEPRESSION TYPE: Chronic | ICD-10-CM

## 2023-06-07 DIAGNOSIS — E55.9 VITAMIN D DEFICIENCY: Chronic | ICD-10-CM

## 2023-06-07 DIAGNOSIS — E78.5 DYSLIPIDEMIA: ICD-10-CM

## 2023-06-07 DIAGNOSIS — K21.9 GASTROESOPHAGEAL REFLUX DISEASE, UNSPECIFIED WHETHER ESOPHAGITIS PRESENT: Chronic | ICD-10-CM

## 2023-06-07 DIAGNOSIS — M48.062 SPINAL STENOSIS OF LUMBAR REGION WITH NEUROGENIC CLAUDICATION: ICD-10-CM

## 2023-06-07 DIAGNOSIS — F41.9 ANXIETY DISORDER, UNSPECIFIED TYPE: ICD-10-CM

## 2023-06-07 DIAGNOSIS — M06.00 SERONEGATIVE RHEUMATOID ARTHRITIS: Primary | ICD-10-CM

## 2023-06-07 PROCEDURE — 99999 PR PBB SHADOW E&M-EST. PATIENT-LVL III: ICD-10-PCS | Mod: PBBFAC,,, | Performed by: INTERNAL MEDICINE

## 2023-06-07 PROCEDURE — 99214 OFFICE O/P EST MOD 30 MIN: CPT | Mod: S$PBB,,, | Performed by: INTERNAL MEDICINE

## 2023-06-07 PROCEDURE — 99214 PR OFFICE/OUTPT VISIT, EST, LEVL IV, 30-39 MIN: ICD-10-PCS | Mod: S$PBB,,, | Performed by: INTERNAL MEDICINE

## 2023-06-07 PROCEDURE — 99999 PR PBB SHADOW E&M-EST. PATIENT-LVL III: CPT | Mod: PBBFAC,,, | Performed by: INTERNAL MEDICINE

## 2023-06-07 PROCEDURE — 99213 OFFICE O/P EST LOW 20 MIN: CPT | Mod: PBBFAC | Performed by: INTERNAL MEDICINE

## 2023-06-07 RX ORDER — PRAVASTATIN SODIUM 10 MG/1
10 TABLET ORAL
Qty: 90 TABLET | Refills: 3 | Status: SHIPPED | OUTPATIENT
Start: 2023-06-07 | End: 2023-09-18

## 2023-06-07 RX ORDER — UPADACITINIB 15 MG/1
15 TABLET, EXTENDED RELEASE ORAL DAILY
Qty: 30 TABLET | Refills: 11 | Status: SHIPPED | OUTPATIENT
Start: 2023-06-07

## 2023-06-07 NOTE — PROGRESS NOTES
"Subjective:       Patient ID: Beronica Olguin is a 67 y.o. female.    Chief Complaint: Follow-up (Follow up. Patient states that the medications are not helping. There has been no improvement. Patient complains of joint pain with hands, feet and hips being the worse. Pain 4/10)    The patient is complaining of joint pain involving the MCP PIP wrist elbow shoulders hips knees and ankles bilaterally.  The pain is 3/10 in intensity dull in quality and continuous.  That is associated with a morning stiffness lasting for more than 60 minutes.  Is also having difficulty maintaining a good night of sleep.  This has been associated with myalgias.  Muscle aches are 3/10 in intensity dull in quality and continuous.  They are associated with fatigue.  No fever no chills no others.  Labs checked during this visit   RHEUMATOID ARTHRITIS RESISTANT TO METHOTREXATE PLAQUENIL STEROIDS AND NSAIDS    Review of Systems   Constitutional:  Negative for appetite change, chills and fever.   HENT:  Negative for congestion, ear pain, mouth sores, nosebleeds and trouble swallowing.    Eyes:  Negative for photophobia and discharge.   Respiratory:  Negative for chest tightness and shortness of breath.    Cardiovascular:  Negative for chest pain.   Gastrointestinal:  Negative for abdominal pain and vomiting.   Endocrine: Negative.    Genitourinary:  Negative for hematuria.   Musculoskeletal:         As per HPI   Skin:  Negative for rash.   Neurological:  Negative for weakness.       Objective:   /71 (BP Location: Right arm, Patient Position: Sitting, BP Method: Medium (Automatic))   Pulse 81   Temp 98.5 °F (36.9 °C) (Oral)   Ht 5' 4" (1.626 m)   Wt 80.1 kg (176 lb 9.6 oz)   SpO2 98%   BMI 30.31 kg/m²      Physical Exam   Constitutional: She is oriented to person, place, and time. She appears well-developed and well-nourished. No distress.   HENT:   Head: Normocephalic and atraumatic.   Right Ear: External ear normal.   Left Ear: " External ear normal.   Eyes: Pupils are equal, round, and reactive to light.   Cardiovascular: Normal rate, regular rhythm and normal heart sounds.   Pulmonary/Chest: Breath sounds normal.   Abdominal: Soft. There is no abdominal tenderness.   Musculoskeletal:      Right shoulder: Tenderness present.      Left shoulder: Tenderness present.      Right elbow: Tenderness present.      Left elbow: Tenderness present.      Right wrist: Tenderness present.      Left wrist: Tenderness present.      Cervical back: Neck supple.      Right hip: Tenderness present.      Left hip: Tenderness present.      Right knee: Tenderness present.      Left knee: Tenderness present.      Right ankle: Tenderness present.      Left ankle: Tenderness present.   Lymphadenopathy:     She has no cervical adenopathy.   Neurological: She is alert and oriented to person, place, and time. She displays normal reflexes. No cranial nerve deficit or sensory deficit. She exhibits normal muscle tone. Coordination normal.   Skin: No rash noted. No erythema.   Vitals reviewed.      Right Side Rheumatological Exam     The patient is tender to palpation of the shoulder, elbow, wrist, knee, 1st PIP, 1st MCP, 2nd PIP, 2nd MCP, 3rd PIP, 3rd MCP, 4th PIP, 4th MCP, 5th PIP, hip, ankle, 1st MTP, 2nd MTP, 3rd MTP, 4th MTP, 5th MTP, 1st toe IP, 2nd toe IP, 3rd toe IP, 4th toe IP and 5th toe IP    Left Side Rheumatological Exam     The patient is tender to palpation of the shoulder, elbow, wrist, knee, 1st PIP, 1st MCP, 2nd PIP, 2nd MCP, 3rd PIP, 3rd MCP, 4th PIP, 4th MCP, 5th PIP, 5th MCP, hip, ankle, 1st MTP, 2nd MTP, 3rd MTP, 4th MTP, 5th MTP, 1st toe IP, 2nd toe IP, 3rd toe IP, 4th toe IP and 5th toe IP.       Completed Fibromyalgia exam 18/18 tender points.  No data to display     Assessment:       1. Seronegative rheumatoid arthritis    2. Vitamin D deficiency    3. Gastroesophageal reflux disease, unspecified whether esophagitis present    4. Anxiety disorder,  unspecified type    5. Depression, unspecified depression type    6. Spinal stenosis of lumbar region with neurogenic claudication    7. Dyslipidemia          Medication List with Changes/Refills   New Medications    PRAVASTATIN (PRAVACHOL) 10 MG TABLET    Take 1 tablet (10 mg total) by mouth daily with dinner or evening meal.       Start Date: 6/7/2023  End Date: 6/6/2024    UPADACITINIB (RINVOQ) 15 MG 24 HR TABLET    Take 1 tablet (15 mg total) by mouth once daily.       Start Date: 6/7/2023  End Date: --   Current Medications    ASPIRIN 81 MG CHEW    Take 81 mg by mouth once daily.       Start Date: --        End Date: --    CETIRIZINE (ZYRTEC) 10 MG TABLET    Take 10 mg by mouth once daily.       Start Date: --        End Date: --    CHOLECALCIFEROL, VITAMIN D3, 125 MCG (5,000 UNIT) TAB    Take 5,000 Units by mouth once daily.       Start Date: --        End Date: --    DESVENLAFAXINE SUCCINATE (PRISTIQ) 25 MG TB24    Take 1 tablet by mouth every morning.       Start Date: 11/17/2022End Date: --    DICLOFENAC (VOLTAREN) 50 MG EC TABLET    Take 1 tablet (50 mg total) by mouth 2 (two) times daily as needed (pain, after food).       Start Date: 2/7/2023  End Date: --    ESTRADIOL (VAGIFEM) 10 MCG TAB    Place 1 tablet vaginally twice a week.       Start Date: 8/10/2022 End Date: --    FLUOXETINE 40 MG CAPSULE    Take 40 mg by mouth.       Start Date: 9/21/2021 End Date: --    LEVOTHYROXINE (SYNTHROID) 50 MCG TABLET    TAKE ONE TABLET BY MOUTH ONCE DAILY       Start Date: 5/18/2023 End Date: --    OMEPRAZOLE (PRILOSEC) 40 MG CAPSULE    Take 1 capsule (40 mg total) by mouth every morning.       Start Date: 2/7/2023  End Date: 2/7/2024    PREMARIN 0.45 MG TABLET    once daily.       Start Date: 2/18/2020 End Date: --    SEMAGLUTIDE (OZEMPIC) 0.25 MG OR 0.5 MG (2 MG/3 ML) PEN INJECTOR    Inject 0.25 mg into the skin every 7 days.       Start Date: 6/2/2023  End Date: --   Discontinued Medications    FOLIC ACID  (FOLVITE) 1 MG TABLET    Take 1 tablet (1 mg total) by mouth once daily. After food       Start Date: 2/7/2023  End Date: 6/7/2023    LEFLUNOMIDE (ARAVA) 20 MG TAB    Take 1 tablet (20 mg total) by mouth daily with dinner or evening meal.       Start Date: 3/10/2023 End Date: 6/7/2023         Plan:         Problem List Items Addressed This Visit          Neuro    Spinal stenosis of lumbar region with neurogenic claudication    Relevant Medications    upadacitinib (RINVOQ) 15 mg 24 hr tablet    pravastatin (PRAVACHOL) 10 MG tablet    Other Relevant Orders    Quantiferon Gold TB    Rheumatoid Quantitative    Uric Acid    HMGCR (HMG Coenzyme A Reductase) Ab       Psychiatric    Depression (Chronic)    Relevant Medications    upadacitinib (RINVOQ) 15 mg 24 hr tablet    pravastatin (PRAVACHOL) 10 MG tablet    Other Relevant Orders    Quantiferon Gold TB    Rheumatoid Quantitative    Uric Acid    HMGCR (HMG Coenzyme A Reductase) Ab    Anxiety disorder, unspecified    Relevant Medications    upadacitinib (RINVOQ) 15 mg 24 hr tablet    pravastatin (PRAVACHOL) 10 MG tablet    Other Relevant Orders    Quantiferon Gold TB    Rheumatoid Quantitative    Uric Acid    HMGCR (HMG Coenzyme A Reductase) Ab       Immunology/Multi System    Seronegative rheumatoid arthritis - Primary    Relevant Medications    upadacitinib (RINVOQ) 15 mg 24 hr tablet    pravastatin (PRAVACHOL) 10 MG tablet    Other Relevant Orders    Quantiferon Gold TB    Rheumatoid Quantitative    Uric Acid    HMGCR (HMG Coenzyme A Reductase) Ab       Endocrine    Vitamin D deficiency (Chronic)    Relevant Medications    upadacitinib (RINVOQ) 15 mg 24 hr tablet    pravastatin (PRAVACHOL) 10 MG tablet    Other Relevant Orders    Quantiferon Gold TB    Rheumatoid Quantitative    Uric Acid    HMGCR (HMG Coenzyme A Reductase) Ab       GI    GERD (gastroesophageal reflux disease) (Chronic)    Relevant Medications    upadacitinib (RINVOQ) 15 mg 24 hr tablet    pravastatin  (PRAVACHOL) 10 MG tablet    Other Relevant Orders    Quantiferon Gold TB    Rheumatoid Quantitative    Uric Acid    HMGCR (HMG Coenzyme A Reductase) Ab     Other Visit Diagnoses       Dyslipidemia        Relevant Medications    pravastatin (PRAVACHOL) 10 MG tablet    Other Relevant Orders    HMGCR (HMG Coenzyme A Reductase) Ab

## 2023-06-23 ENCOUNTER — TELEPHONE (OUTPATIENT)
Dept: RHEUMATOLOGY | Facility: CLINIC | Age: 68
End: 2023-06-23
Payer: MEDICARE

## 2023-06-23 NOTE — TELEPHONE ENCOUNTER
----- Message from Cristiane Merino sent at 6/23/2023  9:33 AM CDT -----  Regarding: Medication  Patient called stating she is still waiting for [employer] Assist for the Rinvoq. She did receive a sample from our office and has requested another sample to hold her over. She has been 2 days without medication.

## 2023-06-24 ENCOUNTER — OFFICE VISIT (OUTPATIENT)
Dept: URGENT CARE | Facility: CLINIC | Age: 68
End: 2023-06-24
Payer: MEDICARE

## 2023-06-24 VITALS
BODY MASS INDEX: 30.05 KG/M2 | HEART RATE: 76 BPM | HEIGHT: 64 IN | OXYGEN SATURATION: 97 % | RESPIRATION RATE: 18 BRPM | DIASTOLIC BLOOD PRESSURE: 79 MMHG | TEMPERATURE: 98 F | WEIGHT: 176 LBS | SYSTOLIC BLOOD PRESSURE: 129 MMHG

## 2023-06-24 DIAGNOSIS — L01.00 IMPETIGO: ICD-10-CM

## 2023-06-24 DIAGNOSIS — M25.551 RIGHT HIP PAIN: Primary | ICD-10-CM

## 2023-06-24 PROCEDURE — 99214 OFFICE O/P EST MOD 30 MIN: CPT | Mod: ,,, | Performed by: FAMILY MEDICINE

## 2023-06-24 PROCEDURE — 99214 PR OFFICE/OUTPT VISIT, EST, LEVL IV, 30-39 MIN: ICD-10-PCS | Mod: ,,, | Performed by: FAMILY MEDICINE

## 2023-06-24 RX ORDER — MUPIROCIN 20 MG/G
OINTMENT TOPICAL 3 TIMES DAILY
Qty: 15 G | Refills: 0 | Status: SHIPPED | OUTPATIENT
Start: 2023-06-24 | End: 2023-10-18 | Stop reason: ALTCHOICE

## 2023-06-24 RX ORDER — SULFAMETHOXAZOLE AND TRIMETHOPRIM 800; 160 MG/1; MG/1
1 TABLET ORAL 2 TIMES DAILY
Qty: 10 TABLET | Refills: 0 | Status: SHIPPED | OUTPATIENT
Start: 2023-06-24 | End: 2023-06-29

## 2023-06-24 RX ORDER — PREDNISONE 20 MG/1
20 TABLET ORAL 2 TIMES DAILY
Qty: 6 TABLET | Refills: 0 | Status: SHIPPED | OUTPATIENT
Start: 2023-06-24 | End: 2023-06-27

## 2023-06-24 RX ORDER — DEXAMETHASONE SODIUM PHOSPHATE 100 MG/10ML
5 INJECTION INTRAMUSCULAR; INTRAVENOUS ONCE
Status: DISCONTINUED | OUTPATIENT
Start: 2023-06-24 | End: 2023-06-24

## 2023-06-24 RX ORDER — SULFAMETHOXAZOLE AND TRIMETHOPRIM 800; 160 MG/1; MG/1
1 TABLET ORAL 2 TIMES DAILY
Qty: 10 TABLET | Refills: 0 | Status: SHIPPED | OUTPATIENT
Start: 2023-06-24 | End: 2023-06-24

## 2023-06-24 RX ORDER — MUPIROCIN 20 MG/G
OINTMENT TOPICAL 3 TIMES DAILY
Qty: 15 G | Refills: 0 | Status: SHIPPED | OUTPATIENT
Start: 2023-06-24 | End: 2023-06-24

## 2023-06-24 NOTE — PROGRESS NOTES
Bactroban ointment and Bactrim sent to [facility] in [place]. Pt request [facility] in [place] instead. Called pharmacy in [place] and canceled original scripts. Sent px to pharmacy in [place].

## 2023-06-24 NOTE — PATIENT INSTRUCTIONS
Discussed the physical finding, differential diagnosis of bursitis, nonspecific, neuralgia.  Encouraged to monitor the symptoms.    Decadron IM today risk and benefits discussed voiced understanding.  Trial of Tylenol or Aleve as needed for pain  With persistent symptoms may need further evaluation.  Follow-up with primary MD  For impetigo keep the area dry and clean.  Medications as directed.  Adequate hydration.  Call this clinic for any questions    Patient updated in the examination room stating had cortisone injection a month ago not 8-10 months ago.  Desires to do the steroid oral  No Decadron IM in the clinic today

## 2023-06-24 NOTE — PROGRESS NOTES
"Subjective:      Patient ID: Beronica Olguin is a 67 y.o. female.    Vitals:  height is 5' 4" (1.626 m) and weight is 79.8 kg (176 lb). Her oral temperature is 98 °F (36.7 °C). Her blood pressure is 129/79 and her pulse is 76. Her respiration is 18 and oxygen saturation is 97%.     Chief Complaint: Rash ( Patient is a 67 y.o. female who presents to urgent care with complaints of rash directly below tail bone with clear yellow tinged leakage, right side painful to walk x1 days. )    HPI:  67-year-old female present to clinic with concerns of right SI joint and hip pain since 1 week.  States under lot of stress was taking care of her mother who passed away 2 days ago.  Positive for chickenpox as a child, no history of shingles.  Patient also noticing some drainage on the pads possible infection upper gluteal area on the left side. For all other medical condition follows up with primary MD Dr. Angel Sher.  Has tolerated cortisone shot in the past with no issues.  Understands the risks and benefits.    ROS :  Constitutional : No fever, no weakness  Eyes : No redness, no drainage  HEENT : No sore throat, no difficulty swallowing  Neck : Negative except HPI  Respiratory : No cough, no shortness of breath or wheezing  Cardiovascular : No chest pain  Integumentary : Negative except HPI  Neurological : No tingling, no weakness   Objective:     Physical Exam  General : Alert and oriented, No apparent distress, Afebrile  Neck -: supple, Non Tender  Respiratory :Lungs are clear to auscultate, Breath sounds are equal  Cardiovascular : Normal rate, normal volume pulse bilateral  Musculoskeletal :  Right hip and right SI joint pressure to palpate, no visible rash or swelling.  Upper gluteal area on left side wide area of redness, discrete red bumps 2-3 spots.  Local losing.  No purulent drainage.  Appears crusty  Integumentary : Warm, Dry   Neurologic : Alert and Oriented X 4, sensations intact, motor intact   Assessment: "     1. Right hip pain    2. Impetigo      Plan:   Discussed the physical finding, differential diagnosis of bursitis, nonspecific, neuralgia.  Encouraged to monitor the symptoms.    Decadron IM today risk and benefits discussed voiced understanding Trial of Tylenol or Aleve as needed for pain  With persistent symptoms may need further evaluation.  Follow-up with primary MD  For impetigo keep the area dry and clean.  Medications as directed.  Adequate hydration.  Call this clinic for any questions    Right hip pain  -     Discontinue: dexAMETHasone injection 5 mg  -     predniSONE (DELTASONE) 20 MG tablet; Take 1 tablet (20 mg total) by mouth 2 (two) times daily. for 3 days  Dispense: 6 tablet; Refill: 0    Impetigo  -     sulfamethoxazole-trimethoprim 800-160mg (BACTRIM DS) 800-160 mg Tab; Take 1 tablet by mouth 2 (two) times daily. for 5 days  Dispense: 10 tablet; Refill: 0  -     mupirocin (BACTROBAN) 2 % ointment; Apply topically 3 (three) times daily.  Dispense: 15 g; Refill: 0    Patient updated in the examination room stating had cortisone injection a month ago not 8-10 months ago.  Desires to do the steroid oral  No Decadron IM in the clinic today

## 2023-06-28 ENCOUNTER — TELEPHONE (OUTPATIENT)
Dept: RHEUMATOLOGY | Facility: CLINIC | Age: 68
End: 2023-06-28
Payer: MEDICARE

## 2023-06-28 NOTE — TELEPHONE ENCOUNTER
----- Message from Cristiane Merino sent at 6/28/2023 10:01 AM CDT -----  Regarding: [employer] Application  Patient called wanting to know if the application for [employer] for the Rinvoq was completed and sent back. [phone]

## 2023-07-02 ENCOUNTER — OFFICE VISIT (OUTPATIENT)
Dept: URGENT CARE | Facility: CLINIC | Age: 68
End: 2023-07-02
Payer: MEDICARE

## 2023-07-02 VITALS
HEIGHT: 64 IN | HEART RATE: 81 BPM | BODY MASS INDEX: 28.51 KG/M2 | SYSTOLIC BLOOD PRESSURE: 122 MMHG | DIASTOLIC BLOOD PRESSURE: 76 MMHG | WEIGHT: 167 LBS | OXYGEN SATURATION: 97 % | RESPIRATION RATE: 18 BRPM | TEMPERATURE: 98 F

## 2023-07-02 DIAGNOSIS — B02.9 HERPES ZOSTER WITHOUT COMPLICATION: Primary | ICD-10-CM

## 2023-07-02 PROCEDURE — 99213 OFFICE O/P EST LOW 20 MIN: CPT | Mod: ,,, | Performed by: FAMILY MEDICINE

## 2023-07-02 PROCEDURE — 99213 PR OFFICE/OUTPT VISIT, EST, LEVL III, 20-29 MIN: ICD-10-PCS | Mod: ,,, | Performed by: FAMILY MEDICINE

## 2023-07-02 RX ORDER — VALACYCLOVIR HYDROCHLORIDE 1 G/1
1000 TABLET, FILM COATED ORAL EVERY 8 HOURS
Qty: 21 TABLET | Refills: 0 | Status: SHIPPED | OUTPATIENT
Start: 2023-07-02 | End: 2024-01-29

## 2023-07-02 NOTE — PROGRESS NOTES
"Subjective:      Patient ID: Beronica Olguin is a 67 y.o. female.    Vitals:  height is 5' 4" (1.626 m) and weight is 75.8 kg (167 lb). Her temperature is 98.1 °F (36.7 °C). Her blood pressure is 122/76 and her pulse is 81. Her respiration is 18 and oxygen saturation is 97%.     Chief Complaint: Rash (Here last week for a rash -- looks different now. Located over torso -- worse on right side, painful (burns) x 8-9 days. Denies itching. )    New rash to R flank the last few days with severe burning pain. No fever.       Constitution: Negative for chills and fatigue.   Cardiovascular:  Negative for chest pain.   Skin:  Positive for rash.   Neurological:  Negative for dizziness.    Objective:     Physical Exam   HENT:   Nose: Nose normal.   Mouth/Throat: Mucous membranes are moist.   Cardiovascular: Normal rate.   Pulmonary/Chest: Effort normal.   Abdominal: Normal appearance.   Neurological: no focal deficit. She is alert.   Skin: Capillary refill takes less than 2 seconds.         Comments: R flank with four small patches, one with start of vesicular lesions.  No fluctuance.    Psychiatric: Mood normal.   Nursing note and vitals reviewed.    Assessment:     1. Herpes zoster without complication        Plan:       Herpes zoster without complication  -     valACYclovir (VALTREX) 1000 MG tablet; Take 1 tablet (1,000 mg total) by mouth every 8 (eight) hours. for 7 days  Dispense: 21 tablet; Refill: 0                    "

## 2023-07-02 NOTE — PATIENT INSTRUCTIONS
Valtrex with lots of fluids, cover any draining areas.  Can take home gabapentin with starting dose of 100 mg three times a day.  Gabapentin can cause sedation.

## 2023-07-07 ENCOUNTER — TELEPHONE (OUTPATIENT)
Dept: RHEUMATOLOGY | Facility: CLINIC | Age: 68
End: 2023-07-07
Payer: MEDICARE

## 2023-07-07 NOTE — TELEPHONE ENCOUNTER
Patient called wanting to know the status of her Rinvoq with [employer]. I did advise the patient that jenni is still working on it and we will call her with the status soon as we hear something . Please Advise. Thanks

## 2023-07-13 ENCOUNTER — TELEPHONE (OUTPATIENT)
Dept: RHEUMATOLOGY | Facility: CLINIC | Age: 68
End: 2023-07-13
Payer: MEDICARE

## 2023-07-13 NOTE — TELEPHONE ENCOUNTER
Patient called regarding the status of her Rinvoq medication. Ann is working on the application for it and it is still pending . She should get a response by Monday 07/17/23. Ann did let me know that she will call the patient and keep her updated . The patient is going out of town and is wanting to know if she could get Rinvoq samples . Please Advise . Thanks

## 2023-07-24 LAB — BCS RECOMMENDATION EXT: NORMAL

## 2023-09-18 ENCOUNTER — OFFICE VISIT (OUTPATIENT)
Dept: INTERNAL MEDICINE | Facility: CLINIC | Age: 68
End: 2023-09-18
Payer: MEDICARE

## 2023-09-18 VITALS
OXYGEN SATURATION: 97 % | HEART RATE: 70 BPM | WEIGHT: 158 LBS | SYSTOLIC BLOOD PRESSURE: 120 MMHG | HEIGHT: 64 IN | DIASTOLIC BLOOD PRESSURE: 74 MMHG | BODY MASS INDEX: 26.98 KG/M2

## 2023-09-18 DIAGNOSIS — R10.9 ABDOMINAL PAIN, UNSPECIFIED ABDOMINAL LOCATION: Primary | ICD-10-CM

## 2023-09-18 DIAGNOSIS — M54.9 BACK PAIN, UNSPECIFIED BACK LOCATION, UNSPECIFIED BACK PAIN LATERALITY, UNSPECIFIED CHRONICITY: ICD-10-CM

## 2023-09-18 DIAGNOSIS — T46.6X5A STATIN-INDUCED MYOSITIS: ICD-10-CM

## 2023-09-18 DIAGNOSIS — E78.5 HYPERLIPIDEMIA, UNSPECIFIED HYPERLIPIDEMIA TYPE: ICD-10-CM

## 2023-09-18 DIAGNOSIS — M60.9 STATIN-INDUCED MYOSITIS: ICD-10-CM

## 2023-09-18 PROCEDURE — 99213 PR OFFICE/OUTPT VISIT, EST, LEVL III, 20-29 MIN: ICD-10-PCS | Mod: ,,, | Performed by: INTERNAL MEDICINE

## 2023-09-18 PROCEDURE — 99213 OFFICE O/P EST LOW 20 MIN: CPT | Mod: ,,, | Performed by: INTERNAL MEDICINE

## 2023-09-18 NOTE — ASSESSMENT & PLAN NOTE
Discussed last cholesterol level   She is statin intolerant due to myositis   Plans are to obtain a calcium heart scan and repeat her lipids since she is been a Ozempic therapy she is lost significant weight and cholesterol maybe quite low

## 2023-09-18 NOTE — PROGRESS NOTES
Angel Matos MD        PATIENT NAME: Beronica Olguin  : 1955  DATE: 23  MRN: [id number]      Billing Provider: Angel Matos MD  Level of Service: [place] OFFICE/OUTPT VISIT, EST, LEVL III, 20-29 MIN  Patient PCP Information       Provider PCP Type    Angel Matos MD General            Reason for Visit / Chief Complaint: No chief complaint on file.       Update PCP  Update Chief Complaint         History of Present Illness / Problem Focused Workflow     Beronica Olguin presents to the clinic with No chief complaint on file.     Iban is here for a couple of problems 1st her elevated cholesterol level when she was here 3 months ago we scheduled her for a calcium heart scan her 1 5 or 6 years ago was 0 since that time she saw a rheumatologist and he put her on a stat which she immediately became symptomatic with muscle pain in the past when she is tried a statin she had significant myositis   She also has problems with pain in the left flank area of her back she is worried about a kidney her internal organs and pain in the        Review of Systems   Review of Systems   Constitutional: Negative.    HENT: Negative.     Eyes: Negative.    Respiratory: Negative.     Cardiovascular: Negative.    Gastrointestinal: Negative.    Endocrine: Negative.    Genitourinary: Negative.    Musculoskeletal: Negative.    Integumentary:  Negative.   Neurological: Negative.    Psychiatric/Behavioral: Negative.          Medical / Social / Family History     Past Medical History:   Diagnosis Date    Anxiety disorder, unspecified     Depression     GERD (gastroesophageal reflux disease)     Thyroid nodule     Vitamin D deficiency        Past Surgical History:   Procedure Laterality Date    APPENDECTOMY  2019    AUGMENTATION OF BREAST      2014    BREAST SURGERY  2002     SECTION  1982&83    HYSTERECTOMY  2014    ALLI/BSO for PMB.     [place] REMOVAL OF OVARY/TUBE(S)      TONSILLECTOMY         Social  History  Ms. Olguin  reports that she has never smoked. She has never been exposed to tobacco smoke. She has never used smokeless tobacco. She reports current alcohol use of about 1.0 standard drink of alcohol per week. She reports that she does not use drugs.    Family History  Ms.'s Olguin  family history includes COPD in her mother; Cancer in her mother; Depression in her mother; Diabetes in her maternal grandfather, maternal grandmother, mother, paternal grandmother, and sister; Heart disease in her maternal grandfather; Hypertension in her mother; Lung cancer in her mother; No Known Problems in her brother and father.    Medications and Allergies     Medications  No outpatient medications have been marked as taking for the 9/18/23 encounter (Office Visit) with Angel Sher MD.       Allergies  Review of patient's allergies indicates:   Allergen Reactions    Codeine Nausea And Vomiting     hyperemesis    Demerol [meperidine] Nausea And Vomiting     HYPEREMESIS    Plaquenil [hydroxychloroquine]      Blurry vision not checked by ophtalmology    Tramadol Nausea Only and Nausea And Vomiting       Physical Examination   There were no vitals filed for this visit.  Physical Exam  Constitutional:       Appearance: Normal appearance.   HENT:      Head: Normocephalic and atraumatic.      Right Ear: Tympanic membrane normal.      Left Ear: Tympanic membrane normal.      Nose: Nose normal.      Mouth/Throat:      Mouth: Mucous membranes are moist.   Eyes:      Extraocular Movements: Extraocular movements intact.      Pupils: Pupils are equal, round, and reactive to light.   Cardiovascular:      Rate and Rhythm: Normal rate and regular rhythm.      Pulses: Normal pulses.   Pulmonary:      Effort: Pulmonary effort is normal.      Breath sounds: Normal breath sounds.   Abdominal:      General: Abdomen is flat. Bowel sounds are normal.      Palpations: Abdomen is soft.   Musculoskeletal:         General: Normal range of  motion.      Cervical back: Normal range of motion and neck supple.   Skin:     General: Skin is warm and dry.   Neurological:      General: No focal deficit present.      Mental Status: She is alert and oriented to person, place, and time.   Psychiatric:         Mood and Affect: Mood normal.         Behavior: Behavior normal.          Assessment and Plan (including Health Maintenance)      Problem List  Smart Sets  Document Outside HM   :    Plan:   Abdominal pain, unspecified abdominal location    Back pain, unspecified back location, unspecified back pain laterality, unspecified chronicity    Hyperlipidemia, unspecified hyperlipidemia type    Statin-induced myositis     Abdominal pelvic ultrasound ordered for evaluation of abdominal back pain   Repeat lipid level since weight loss   Calcium heart scan   Referral to physical therapy  Revisit 1 month.           Health Maintenance Due   Topic Date Due    TETANUS VACCINE  Never done    Shingles Vaccine (1 of 2) Never done    COVID-19 Vaccine (3 - Moderna series) 05/06/2021    Mammogram  07/11/2023    Influenza Vaccine (1) 09/01/2023       Problem List Items Addressed This Visit          Cardiac/Vascular    Hyperlipidemia, unspecified (Chronic)       Orthopedic    Back pain (Chronic)       Other    Statin-induced myositis (Chronic)     Other Visit Diagnoses       Abdominal pain, unspecified abdominal location    -  Primary            Health Maintenance Topics with due status: Not Due       Topic Last Completion Date    Colorectal Cancer Screening 03/16/2018    DEXA Scan 07/26/2021    Hemoglobin A1c (Diabetic Prevention Screening) 06/02/2023    Lipid Panel 06/02/2023       Future Appointments   Date Time Provider Department Center   10/13/2023  9:00 AM Primitivo Andino MD [facility]   6/11/2024  8:00 AM Angel Sher MD [place] [id number]            Signature:  Angel Sher MD  [facility]  [address]  [place] [id number]    Date of encounter: 9/18/23

## 2023-09-20 ENCOUNTER — TELEPHONE (OUTPATIENT)
Dept: INTERNAL MEDICINE | Facility: CLINIC | Age: 68
End: 2023-09-20
Payer: MEDICARE

## 2023-09-20 ENCOUNTER — LAB VISIT (OUTPATIENT)
Dept: LAB | Facility: HOSPITAL | Age: 68
End: 2023-09-20
Attending: INTERNAL MEDICINE
Payer: MEDICARE

## 2023-09-20 DIAGNOSIS — E78.5 HYPERLIPIDEMIA, UNSPECIFIED HYPERLIPIDEMIA TYPE: ICD-10-CM

## 2023-09-20 DIAGNOSIS — E03.9 HYPOTHYROIDISM, UNSPECIFIED TYPE: Chronic | ICD-10-CM

## 2023-09-20 DIAGNOSIS — E78.5 HYPERLIPIDEMIA, UNSPECIFIED HYPERLIPIDEMIA TYPE: Primary | ICD-10-CM

## 2023-09-20 DIAGNOSIS — K21.9 GASTROESOPHAGEAL REFLUX DISEASE, UNSPECIFIED WHETHER ESOPHAGITIS PRESENT: Chronic | ICD-10-CM

## 2023-09-20 LAB
CHOLEST SERPL-MCNC: 321 MG/DL
CHOLEST/HDLC SERPL: 4 {RATIO} (ref 0–5)
HDLC SERPL-MCNC: 87 MG/DL (ref 35–60)
LDLC SERPL CALC-MCNC: 217 MG/DL (ref 50–140)
TRIGL SERPL-MCNC: 86 MG/DL (ref 37–140)
TSH SERPL-ACNC: 2.18 UIU/ML (ref 0.35–4.94)
VLDLC SERPL CALC-MCNC: 17 MG/DL

## 2023-09-20 PROCEDURE — 36415 COLL VENOUS BLD VENIPUNCTURE: CPT

## 2023-09-20 PROCEDURE — 84443 ASSAY THYROID STIM HORMONE: CPT

## 2023-09-20 PROCEDURE — 80061 LIPID PANEL: CPT

## 2023-09-20 RX ORDER — ROSUVASTATIN CALCIUM 20 MG/1
20 TABLET, COATED ORAL DAILY
Qty: 90 TABLET | Refills: 3 | Status: SHIPPED | OUTPATIENT
Start: 2023-09-20 | End: 2023-10-18

## 2023-09-20 NOTE — TELEPHONE ENCOUNTER
Reports ultrasound abdominal and pelvic are normal   Cholesterol is quite elevated at 321 in the LDL is 217.  Recommend possibly beginning low-dose statin therapy to see if she could tolerate it maybe Crestor 20 mg twice a day week.

## 2023-09-21 ENCOUNTER — PATIENT MESSAGE (OUTPATIENT)
Dept: INTERNAL MEDICINE | Facility: CLINIC | Age: 68
End: 2023-09-21
Payer: MEDICARE

## 2023-09-21 DIAGNOSIS — Z13.6 ENCOUNTER FOR SCREENING FOR CARDIOVASCULAR DISORDERS: Primary | ICD-10-CM

## 2023-10-04 ENCOUNTER — TELEPHONE (OUTPATIENT)
Dept: INTERNAL MEDICINE | Facility: CLINIC | Age: 68
End: 2023-10-04
Payer: MEDICARE

## 2023-10-05 DIAGNOSIS — E78.5 HYPERLIPIDEMIA, UNSPECIFIED HYPERLIPIDEMIA TYPE: Primary | ICD-10-CM

## 2023-10-05 RX ORDER — EZETIMIBE 10 MG/1
10 TABLET ORAL DAILY
Qty: 90 TABLET | Refills: 3 | Status: SHIPPED | OUTPATIENT
Start: 2023-10-05 | End: 2024-01-29

## 2023-10-13 ENCOUNTER — OFFICE VISIT (OUTPATIENT)
Dept: RHEUMATOLOGY | Facility: CLINIC | Age: 68
End: 2023-10-13
Payer: MEDICARE

## 2023-10-13 VITALS
RESPIRATION RATE: 18 BRPM | DIASTOLIC BLOOD PRESSURE: 79 MMHG | OXYGEN SATURATION: 98 % | WEIGHT: 152.81 LBS | TEMPERATURE: 98 F | SYSTOLIC BLOOD PRESSURE: 125 MMHG | HEIGHT: 64 IN | HEART RATE: 74 BPM | BODY MASS INDEX: 26.09 KG/M2

## 2023-10-13 DIAGNOSIS — M48.062 SPINAL STENOSIS OF LUMBAR REGION WITH NEUROGENIC CLAUDICATION: Primary | ICD-10-CM

## 2023-10-13 DIAGNOSIS — M06.00 SERONEGATIVE RHEUMATOID ARTHRITIS: Chronic | ICD-10-CM

## 2023-10-13 DIAGNOSIS — F32.A DEPRESSION, UNSPECIFIED DEPRESSION TYPE: Chronic | ICD-10-CM

## 2023-10-13 DIAGNOSIS — K21.9 GASTROESOPHAGEAL REFLUX DISEASE, UNSPECIFIED WHETHER ESOPHAGITIS PRESENT: Chronic | ICD-10-CM

## 2023-10-13 DIAGNOSIS — E03.9 HYPOTHYROIDISM, UNSPECIFIED TYPE: Chronic | ICD-10-CM

## 2023-10-13 DIAGNOSIS — E55.9 VITAMIN D DEFICIENCY: Chronic | ICD-10-CM

## 2023-10-13 PROCEDURE — 99999 PR PBB SHADOW E&M-EST. PATIENT-LVL III: ICD-10-PCS | Mod: PBBFAC,,, | Performed by: INTERNAL MEDICINE

## 2023-10-13 PROCEDURE — 99213 OFFICE O/P EST LOW 20 MIN: CPT | Mod: PBBFAC | Performed by: INTERNAL MEDICINE

## 2023-10-13 PROCEDURE — 99999 PR PBB SHADOW E&M-EST. PATIENT-LVL III: CPT | Mod: PBBFAC,,, | Performed by: INTERNAL MEDICINE

## 2023-10-13 PROCEDURE — 99214 PR OFFICE/OUTPT VISIT, EST, LEVL IV, 30-39 MIN: ICD-10-PCS | Mod: S$PBB,,, | Performed by: INTERNAL MEDICINE

## 2023-10-13 PROCEDURE — 99214 OFFICE O/P EST MOD 30 MIN: CPT | Mod: S$PBB,,, | Performed by: INTERNAL MEDICINE

## 2023-10-13 RX ORDER — OMEPRAZOLE 40 MG/1
40 CAPSULE, DELAYED RELEASE ORAL EVERY MORNING
Qty: 90 CAPSULE | Refills: 3 | Status: SHIPPED | OUTPATIENT
Start: 2023-10-13 | End: 2024-10-12

## 2023-10-13 RX ORDER — FLUOXETINE HYDROCHLORIDE 40 MG/1
40 CAPSULE ORAL DAILY
COMMUNITY
Start: 2023-10-02

## 2023-10-13 RX ORDER — PRAVASTATIN SODIUM 10 MG/1
10 TABLET ORAL DAILY
Qty: 90 TABLET | Refills: 3 | Status: SHIPPED | OUTPATIENT
Start: 2023-10-13 | End: 2023-10-18

## 2023-10-13 NOTE — PROGRESS NOTES
"Subjective:       Patient ID: Beronica Olguin is a 68 y.o. female.    Chief Complaint: Follow-up (Patient is a 4 month f/u for RA.  She states the Rinvoq has helped, but for about 2 months she has pain in her 1st toes, Rt > Lt.  Pain today is 4/10.)    The patient states that the Rinvoq has been helping her pain significantly.  She has statin intolerance mainly to Crestor.  I switch her to pravastatin which is a milder statin and I asked her to discuss it with her medical doctor before she starts it.  The patient is complaining of joint pain involving the MCP PIP wrist elbow shoulders hips knees and ankles bilaterally.  The pain is 2/10 in intensity dull in quality and continuous.  That is associated with a morning stiffness lasting for more than 60 minutes.  Is also having difficulty maintaining a good night of sleep.  This has been associated with myalgias.  Muscle aches are 2/10 in intensity dull in quality and continuous.  They are associated with fatigue.  No fever no chills no others.  Labs checked during this visit         Review of Systems   Constitutional:  Negative for appetite change, chills and fever.   HENT:  Negative for congestion, ear pain, mouth sores, nosebleeds and trouble swallowing.    Eyes:  Negative for photophobia and discharge.   Respiratory:  Negative for chest tightness and shortness of breath.    Cardiovascular:  Negative for chest pain.   Gastrointestinal:  Negative for abdominal pain and vomiting.   Endocrine: Negative.    Genitourinary:  Negative for hematuria.   Musculoskeletal:         As per HPI   Skin:  Negative for rash.   Neurological:  Negative for weakness.         Objective:   /79 (BP Location: Right arm, Patient Position: Sitting, BP Method: Medium (Automatic))   Pulse 74   Temp 97.8 °F (36.6 °C) (Temporal)   Resp 18   Ht 5' 4" (1.626 m)   Wt 69.3 kg (152 lb 12.8 oz)   SpO2 98%   BMI 26.23 kg/m²      Physical Exam   Constitutional: She is oriented to person, " place, and time. She appears well-developed and well-nourished. No distress.   HENT:   Head: Normocephalic and atraumatic.   Right Ear: External ear normal.   Left Ear: External ear normal.   Eyes: Pupils are equal, round, and reactive to light.   Cardiovascular: Normal rate, regular rhythm and normal heart sounds.   Pulmonary/Chest: Breath sounds normal.   Abdominal: Soft. There is no abdominal tenderness.   Musculoskeletal:      Right shoulder: Normal.      Left shoulder: Normal.      Right elbow: Normal.      Left elbow: Normal.      Right wrist: Normal.      Left wrist: Normal.      Cervical back: Neck supple.      Right hip: Normal.      Left hip: Normal.      Right knee: Normal.      Left knee: Normal.   Lymphadenopathy:     She has no cervical adenopathy.   Neurological: She is alert and oriented to person, place, and time. She displays normal reflexes. No cranial nerve deficit or sensory deficit. She exhibits normal muscle tone. Coordination normal.   Skin: No rash noted. No erythema.   Vitals reviewed.      Right Side Rheumatological Exam     Examination finds the shoulder, elbow, wrist, knee, 1st PIP, 1st MCP, 2nd PIP, 2nd MCP, 3rd PIP, 3rd MCP, 4th PIP, 4th MCP, 5th PIP, 5th MCP, temporomandibular, hip, ankle, 1st MTP, 2nd MTP, 3rd MTP, 4th MTP and 5th MTP normal.    Left Side Rheumatological Exam     Examination finds the shoulder, elbow, wrist, knee, 1st PIP, 1st MCP, 2nd PIP, 2nd MCP, 3rd PIP, 3rd MCP, 4th PIP, 4th MCP, 5th PIP, 5th MCP, temporomandibular, hip, ankle, 1st MTP, 2nd MTP, 3rd MTP, 4th MTP and 5th MTP normal.         Completed Fibromyalgia exam 4/18 tender points.  No data to display     Assessment:       1. Spinal stenosis of lumbar region with neurogenic claudication    2. Depression, unspecified depression type    3. Seronegative rheumatoid arthritis    4. Hypothyroidism, unspecified type    5. Vitamin D deficiency    6. Gastroesophageal reflux disease, unspecified whether esophagitis  present          Medication List with Changes/Refills   New Medications    PRAVASTATIN (PRAVACHOL) 10 MG TABLET    Take 1 tablet (10 mg total) by mouth once daily.       Start Date: 10/13/2023End Date: 10/12/2024   Current Medications    DESVENLAFAXINE SUCCINATE (PRISTIQ) 25 MG TB24    Take 1 tablet by mouth every morning.       Start Date: 11/17/2022End Date: --    DICLOFENAC (VOLTAREN) 50 MG EC TABLET    Take 1 tablet (50 mg total) by mouth 2 (two) times daily as needed (pain, after food).       Start Date: 2/7/2023  End Date: --    EZETIMIBE (ZETIA) 10 MG TABLET    Take 1 tablet (10 mg total) by mouth once daily.       Start Date: 10/5/2023 End Date: 10/4/2024    FLUOXETINE 40 MG CAPSULE    Take 40 mg by mouth Daily.       Start Date: 10/2/2023 End Date: --    LEVOTHYROXINE (SYNTHROID) 50 MCG TABLET    TAKE ONE TABLET BY MOUTH ONCE DAILY       Start Date: 5/18/2023 End Date: --    PREMARIN 0.45 MG TABLET    once daily.       Start Date: 2/18/2020 End Date: --    ROSUVASTATIN (CRESTOR) 20 MG TABLET    Take 1 tablet (20 mg total) by mouth once daily.       Start Date: 9/20/2023 End Date: 9/19/2024    SEMAGLUTIDE (OZEMPIC) 0.25 MG OR 0.5 MG (2 MG/3 ML) PEN INJECTOR    Inject 0.25 mg into the skin every 7 days.       Start Date: 6/2/2023  End Date: --    UPADACITINIB (RINVOQ) 15 MG 24 HR TABLET    Take 1 tablet (15 mg total) by mouth once daily.       Start Date: 6/7/2023  End Date: --    VALACYCLOVIR (VALTREX) 1000 MG TABLET    Take 1 tablet (1,000 mg total) by mouth every 8 (eight) hours. for 7 days       Start Date: 7/2/2023  End Date: 9/18/2023   Changed and/or Refilled Medications    Modified Medication Previous Medication    OMEPRAZOLE (PRILOSEC) 40 MG CAPSULE omeprazole (PRILOSEC) 40 MG capsule       Take 1 capsule (40 mg total) by mouth every morning.    Take 1 capsule (40 mg total) by mouth every morning.       Start Date: 10/13/2023End Date: 10/12/2024    Start Date: 2/7/2023  End Date: 10/13/2023    Discontinued Medications    MUPIROCIN (BACTROBAN) 2 % OINTMENT    Apply topically 3 (three) times daily.       Start Date: 6/24/2023 End Date: 10/13/2023         Plan:         Problem List Items Addressed This Visit          Neuro    Spinal stenosis of lumbar region with neurogenic claudication - Primary    Relevant Medications    omeprazole (PRILOSEC) 40 MG capsule    pravastatin (PRAVACHOL) 10 MG tablet       Psychiatric    Depression (Chronic)    Relevant Medications    omeprazole (PRILOSEC) 40 MG capsule    pravastatin (PRAVACHOL) 10 MG tablet       Immunology/Multi System    Seronegative rheumatoid arthritis (Chronic)    Relevant Medications    omeprazole (PRILOSEC) 40 MG capsule    pravastatin (PRAVACHOL) 10 MG tablet       Endocrine    Hypothyroid (Chronic)    Relevant Medications    omeprazole (PRILOSEC) 40 MG capsule    pravastatin (PRAVACHOL) 10 MG tablet    Vitamin D deficiency (Chronic)    Relevant Medications    omeprazole (PRILOSEC) 40 MG capsule    pravastatin (PRAVACHOL) 10 MG tablet       GI    GERD (gastroesophageal reflux disease) (Chronic)    Relevant Medications    omeprazole (PRILOSEC) 40 MG capsule    pravastatin (PRAVACHOL) 10 MG tablet

## 2023-10-18 ENCOUNTER — OFFICE VISIT (OUTPATIENT)
Dept: INTERNAL MEDICINE | Facility: CLINIC | Age: 68
End: 2023-10-18
Payer: MEDICARE

## 2023-10-18 VITALS
WEIGHT: 154.38 LBS | HEIGHT: 64 IN | HEART RATE: 70 BPM | BODY MASS INDEX: 26.36 KG/M2 | DIASTOLIC BLOOD PRESSURE: 72 MMHG | SYSTOLIC BLOOD PRESSURE: 134 MMHG | OXYGEN SATURATION: 98 %

## 2023-10-18 DIAGNOSIS — F32.A DEPRESSION, UNSPECIFIED DEPRESSION TYPE: Chronic | ICD-10-CM

## 2023-10-18 DIAGNOSIS — M54.9 BACK PAIN, UNSPECIFIED BACK LOCATION, UNSPECIFIED BACK PAIN LATERALITY, UNSPECIFIED CHRONICITY: Primary | Chronic | ICD-10-CM

## 2023-10-18 DIAGNOSIS — I77.9 CAROTID ARTERY DISEASE, UNSPECIFIED LATERALITY, UNSPECIFIED TYPE: ICD-10-CM

## 2023-10-18 DIAGNOSIS — E78.5 HYPERLIPIDEMIA, UNSPECIFIED HYPERLIPIDEMIA TYPE: ICD-10-CM

## 2023-10-18 PROCEDURE — 99213 PR OFFICE/OUTPT VISIT, EST, LEVL III, 20-29 MIN: ICD-10-PCS | Mod: ,,, | Performed by: INTERNAL MEDICINE

## 2023-10-18 PROCEDURE — 99213 OFFICE O/P EST LOW 20 MIN: CPT | Mod: ,,, | Performed by: INTERNAL MEDICINE

## 2023-10-18 RX ORDER — DESVENLAFAXINE SUCCINATE 50 MG/1
50 TABLET, EXTENDED RELEASE ORAL
COMMUNITY
Start: 2023-10-02 | End: 2024-01-29

## 2023-10-18 RX ORDER — PRAVASTATIN SODIUM 10 MG/1
10 TABLET ORAL DAILY
Qty: 30 TABLET | Refills: 11 | Status: SHIPPED | OUTPATIENT
Start: 2023-10-18 | End: 2023-11-13 | Stop reason: SDUPTHER

## 2023-10-18 NOTE — ASSESSMENT & PLAN NOTE
Decrease Pristiq 25 mg for 2-3 weeks then 3 times a week for 2 weeks twice a day week and discontinue.

## 2023-10-18 NOTE — PROGRESS NOTES
Angel Matos MD        PATIENT NAME: Beronica Olguin  : 1955  DATE: 10/18/23  MRN: [id number]      Billing Provider: Angel Matos MD  Level of Service: [place] OFFICE/OUTPT VISIT, EST, LEVL III, 20-29 MIN  Patient PCP Information       Provider PCP Type    Angel Matos MD General            Reason for Visit / Chief Complaint: Follow-up (4 wk f/u/)       Update PCP  Update Chief Complaint         History of Present Illness / Problem Focused Workflow     Beronica Olguin presents to the clinic with Follow-up (4 wk f/u/)     Beronica comes in follow-up for her back pain and her hyperlipidemia  She could not tolerate the Zetia due to side effects   Her rheumatologist suggested she try Pravachol  Her family situation his loving out and she would like to wean off her antidepressants her other physician has her on to up on Pristiq and fluoxetine.          Review of Systems   Review of Systems   Constitutional: Negative.    HENT: Negative.     Eyes: Negative.    Respiratory: Negative.     Cardiovascular: Negative.    Gastrointestinal: Negative.    Endocrine: Negative.    Genitourinary: Negative.    Musculoskeletal: Negative.    Integumentary:  Negative.   Neurological: Negative.    Psychiatric/Behavioral: Negative.          Medical / Social / Family History     Past Medical History:   Diagnosis Date    Depression     GERD (gastroesophageal reflux disease)     Thyroid nodule     Vitamin D deficiency        Past Surgical History:   Procedure Laterality Date    APPENDECTOMY  2019    AUGMENTATION OF BREAST      2014    BREAST SURGERY  2002     SECTION  &    HYSTERECTOMY      ALLI/BSO for PMB.     [place] REMOVAL OF OVARY/TUBE(S)      TONSILLECTOMY         Social History  Ms. Olguin  reports that she has never smoked. She has never been exposed to tobacco smoke. She has never used smokeless tobacco. She reports current alcohol use of about 1.0 standard drink of alcohol per week. She reports that  she does not use drugs.    Family History  Ms.'s Clau  family history includes COPD in her mother; Cancer in her mother; Depression in her mother; Diabetes in her maternal grandfather, maternal grandmother, mother, paternal grandmother, and sister; Heart disease in her maternal grandfather; Hypertension in her mother; Lung cancer in her mother; No Known Problems in her brother and father.    Medications and Allergies     Medications  Outpatient Medications Marked as Taking for the 10/18/23 encounter (Office Visit) with Angel Sher MD   Medication Sig Dispense Refill    desvenlafaxine succinate (PRISTIQ) 50 MG Tb24 Take 50 mg by mouth.      ezetimibe (ZETIA) 10 mg tablet Take 1 tablet (10 mg total) by mouth once daily. 90 tablet 3    FLUoxetine 40 MG capsule Take 40 mg by mouth Daily.      levothyroxine (SYNTHROID) 50 MCG tablet TAKE ONE TABLET BY MOUTH ONCE DAILY 90 tablet 3    omeprazole (PRILOSEC) 40 MG capsule Take 1 capsule (40 mg total) by mouth every morning. 90 capsule 3    PREMARIN 0.45 mg tablet once daily.      semaglutide (OZEMPIC) 0.25 mg or 0.5 mg (2 mg/3 mL) pen injector Inject 0.25 mg into the skin every 7 days. 4 each 11    upadacitinib (RINVOQ) 15 mg 24 hr tablet Take 1 tablet (15 mg total) by mouth once daily. 30 tablet 11    valACYclovir (VALTREX) 1000 MG tablet Take 1 tablet (1,000 mg total) by mouth every 8 (eight) hours. for 7 days 21 tablet 0    [DISCONTINUED] desvenlafaxine succinate (PRISTIQ) 25 mg Tb24 Take 1 tablet by mouth every morning.         Allergies  Review of patient's allergies indicates:   Allergen Reactions    Codeine Nausea And Vomiting     hyperemesis    Demerol [meperidine] Nausea And Vomiting     HYPEREMESIS    Plaquenil [hydroxychloroquine]      Blurry vision not checked by ophtalmology    Tramadol Nausea Only and Nausea And Vomiting       Physical Examination     Vitals:    10/18/23 1311   BP: 134/72   Pulse: 70     Physical Exam  Constitutional:       Appearance:  Normal appearance.   HENT:      Head: Normocephalic and atraumatic.      Right Ear: Tympanic membrane normal.      Left Ear: Tympanic membrane normal.      Nose: Nose normal.      Mouth/Throat:      Mouth: Mucous membranes are moist.   Eyes:      Extraocular Movements: Extraocular movements intact.      Pupils: Pupils are equal, round, and reactive to light.   Cardiovascular:      Rate and Rhythm: Normal rate and regular rhythm.      Pulses: Normal pulses.   Pulmonary:      Effort: Pulmonary effort is normal.      Breath sounds: Normal breath sounds.   Abdominal:      General: Abdomen is flat. Bowel sounds are normal.      Palpations: Abdomen is soft.   Musculoskeletal:         General: Normal range of motion.      Cervical back: Normal range of motion and neck supple.   Skin:     General: Skin is warm and dry.   Neurological:      General: No focal deficit present.      Mental Status: She is alert and oriented to person, place, and time.   Psychiatric:         Mood and Affect: Mood normal.         Behavior: Behavior normal.          Assessment and Plan (including Health Maintenance)      Problem List  Smart Sets  Document Outside HM   :    Plan:    ICD-10-CM ICD-9-CM   1. Back pain, unspecified back location, unspecified back pain laterality, unspecified chronicity  M54.9 724.5   2. Carotid artery disease, unspecified laterality, unspecified type  I77.9 447.9   3. Hyperlipidemia, unspecified hyperlipidemia type  E78.5 272.4   4. Depression, unspecified depression type  F32.A 311       Problem List Items Addressed This Visit          Psychiatric    Depression (Chronic)     Decrease Pristiq 25 mg for 2-3 weeks then 3 times a week for 2 weeks twice a day week and discontinue.            Cardiac/Vascular    Hyperlipidemia, unspecified (Chronic)     Discussed results her calcium heart scan is 0 very low risk   Carotid ultrasound ordered for completion   Begin Pravachol 10 mg a day to see if she can tolerated.             Orthopedic    Back pain - Primary (Chronic)     Stable with no more flares          Other Visit Diagnoses       Carotid artery disease, unspecified laterality, unspecified type                       Health Maintenance Due   Topic Date Due    TETANUS VACCINE  Never done    Shingles Vaccine (1 of 2) Never done    Mammogram  07/11/2023    Influenza Vaccine (1) 09/01/2023    COVID-19 Vaccine (3 - 2023-24 season) 09/01/2023       Problem List Items Addressed This Visit          Psychiatric    Depression (Chronic)    Current Assessment & Plan     Decrease Pristiq 25 mg for 2-3 weeks then 3 times a week for 2 weeks twice a day week and discontinue.            Cardiac/Vascular    Hyperlipidemia, unspecified (Chronic)    Current Assessment & Plan     Discussed results her calcium heart scan is 0 very low risk   Carotid ultrasound ordered for completion   Begin Pravachol 10 mg a day to see if she can tolerated.            Orthopedic    Back pain - Primary (Chronic)    Current Assessment & Plan     Stable with no more flares          Other Visit Diagnoses       Carotid artery disease, unspecified laterality, unspecified type                Health Maintenance Topics with due status: Not Due       Topic Last Completion Date    Colorectal Cancer Screening 03/16/2018    DEXA Scan 07/26/2021    Hemoglobin A1c (Diabetic Prevention Screening) 06/02/2023    Lipid Panel 09/20/2023       Future Appointments   Date Time Provider Department Center   2/23/2024  8:30 AM Primitivo Andino MD [facility]   6/11/2024  8:00 AM Angel Sher MD [facility] [place] [id number]            Signature:  Angel Sher MD  [facility]  [facility] [id number]    Date of encounter: 10/18/23

## 2023-10-18 NOTE — LETTER
AUTHORIZATION FOR RELEASE OF   CONFIDENTIAL INFORMATION    Dear Kathy Benedict,    We are seeing Beronica Olguin, date of birth 1955, in the clinic at [place] [address] [facility]. Angel Sher MD is the patient's PCP. Beronica Olguin has an outstanding lab/procedure at the time we reviewed her chart. In order to help keep her health information updated, she has authorized us to request the following medical record(s):        ( X )  MAMMOGRAM                                      (  )  COLONOSCOPY      (  )  PAP SMEAR                                          (  )  OUTSIDE LAB RESULTS     (  )  DEXA SCAN                                          (  )  EYE EXAM            (  )  FOOT EXAM                                          (  )  ENTIRE RECORD     (  )  OUTSIDE IMMUNIZATIONS                 (  )  _______________         Please fax records to Angel Sher MD, [phone].              Patient Name: Beronica Olguin  : 1955  Patient Phone #: [phone]

## 2023-10-18 NOTE — ASSESSMENT & PLAN NOTE
Discussed results her calcium heart scan is 0 very low risk   Carotid ultrasound ordered for completion   Begin Pravachol 10 mg a day to see if she can tolerated.   (3) no apparent problem

## 2023-10-30 ENCOUNTER — PATIENT OUTREACH (OUTPATIENT)
Dept: ADMINISTRATIVE | Facility: HOSPITAL | Age: 68
End: 2023-10-30
Payer: MEDICARE

## 2023-10-30 NOTE — LETTER
AUTHORIZATION FOR RELEASE OF   CONFIDENTIAL INFORMATION    Dear Dr. Mann,    We are seeing Beronica Olguin, date of birth 1955, in the clinic at [place] [address] [facility]. Angel Sher MD is the patient's PCP. Beronica Olguin has an outstanding lab/procedure at the time we reviewed her chart. In order to help keep her health information updated, she has authorized us to request the following medical record(s):        (  )  MAMMOGRAM                                      ( X )  COLONOSCOPY      (  )  PAP SMEAR                                          (  )  OUTSIDE LAB RESULTS     (  )  DEXA SCAN                                          (  )  EYE EXAM            (  )  FOOT EXAM                                          (  )  ENTIRE RECORD     (  )  OUTSIDE IMMUNIZATIONS                 (  )  _______________         Please fax records to [facility], [place], Kenneth E, MD, [phone]       If you have any questions, please contact [place]via at [phone]            Patient Name: Beronica Olguin  : 1955  Patient Phone #: [phone]

## 2023-10-30 NOTE — PROGRESS NOTES
The following record(s)  below were uploaded for Health Maintenance .    MAMMOGRAM SCREENING      07/24/23        Population [facility] Outreach.

## 2023-11-02 ENCOUNTER — HOSPITAL ENCOUNTER (OUTPATIENT)
Dept: RADIOLOGY | Facility: HOSPITAL | Age: 68
Discharge: HOME | End: 2023-11-02
Attending: INTERNAL MEDICINE
Payer: MEDICARE

## 2023-11-02 DIAGNOSIS — I77.9 CAROTID ARTERY DISEASE, UNSPECIFIED LATERALITY, UNSPECIFIED TYPE: ICD-10-CM

## 2023-11-02 PROCEDURE — 93880 EXTRACRANIAL BILAT STUDY: CPT | Mod: TC

## 2023-11-13 DIAGNOSIS — E78.5 HYPERLIPIDEMIA, UNSPECIFIED HYPERLIPIDEMIA TYPE: Primary | Chronic | ICD-10-CM

## 2023-11-13 RX ORDER — PRAVASTATIN SODIUM 10 MG/1
10 TABLET ORAL DAILY
Qty: 90 TABLET | Refills: 3 | Status: SHIPPED | OUTPATIENT
Start: 2023-11-13 | End: 2024-01-29

## 2023-12-07 DIAGNOSIS — K21.9 GASTROESOPHAGEAL REFLUX DISEASE, UNSPECIFIED WHETHER ESOPHAGITIS PRESENT: Chronic | ICD-10-CM

## 2023-12-07 DIAGNOSIS — E11.9 TYPE 2 DIABETES MELLITUS WITHOUT COMPLICATION, UNSPECIFIED WHETHER LONG TERM INSULIN USE: Primary | ICD-10-CM

## 2024-01-03 NOTE — PROGRESS NOTES
Angel Sher MD        PATIENT NAME: Beronica Olguin  : 1955  DATE: 23  MRN: [id number]      Patient Care Team:  Angel Sher MD as PCP - General (Internal Medicine)       Billing Provider: Angel Sher MD  Level of Service: [place] MEDIC[place] ANNUAL WELLNESS SUBSEQUENT VISIT  Patient PCP Information       Provider PCP Type    Angel Sher MD General            Reason for Visit / Chief Complaint: Medicare AWV (Wellness/)       Update PCP  Update Chief Complaint         History of Present Illness / Problem Focused Workflow     Beronica Olguin presents to the clinic with Medicare AWV (Wellness/)     Beronica is here for annual Medicare wellness exam   She is having lot issues her mother's dying of lung cancer [place] under tremendous amount of stress   Continue have back pain   Her weight is a big issue and she wants to do some about it.      Review of Systems   Review of Systems   Constitutional: Negative.    HENT: Negative.     Eyes: Negative.    Respiratory: Negative.     Cardiovascular: Negative.    Gastrointestinal: Negative.    Endocrine: Negative.    Genitourinary: Negative.    Musculoskeletal: Negative.    Integumentary:  Negative.   Neurological: Negative.    Psychiatric/Behavioral: Negative.        Patient Reported Health Risk Assessment       Medical / Social / Family History     Past Medical History:   Diagnosis Date    Anxiety disorder, unspecified     Depression     GERD (gastroesophageal reflux disease)     Thyroid nodule     Vitamin D deficiency        Past Surgical History:   Procedure Laterality Date    APPENDECTOMY  2019    AUGMENTATION OF BREAST      2014    BREAST SURGERY  2002     SECTION  &83    HYSTERECTOMY  2014    ALLI/BSO for PMB.     ND REMOVAL OF OVARY/TUBE(S)      TONSILLECTOMY         Social History  Ms. Olguin  reports that she has never smoked. She has never been exposed to tobacco smoke. She has never used smokeless tobacco. She reports  [place]  [facility]   Physical Therapy Treatment Note      Name: Erasmo Dickens  [facility] Number: [id number]    Therapy Diagnosis:   No diagnosis found.          Physician: Karissa Keating MD    Visit Date: 12/13/2023    Physician Orders: PT Eval and Treat  Medical Diagnosis from Referral: Strain of left trapezius muscle, subsequent encounter [S46.812D]   Evaluation Date: 11/2/2023  Authorization Period Expiration: 10/24/2023  Plan of Care Expiration: 1/2/2023  Progress Note Due: 12/2/2023  Date of Surgery: N/a  Visit # / Visits authorized: 3/ 6   FOTO: 1/ 3    PTA Visit #: 0/5       Subjective     Patient reports: her neck and shoulder feel great today  She was compliant with home exercise program.  Response to previous treatment: Initial Eval  Functional change: None    Pain: 1/10  Location: left trapezius    Objective      Objective Measures updated at progress report unless specified.     Treatment     Neci received the treatments listed below:    therapeutic exercises to develop strength, endurance, ROM, flexibility, posture, and core stabilization for 25 minutes including:  UBE 3'/3' (fwd/bckwd)  Upper Trap Stretch 3 x 30'  Levator Scap Stretch 3 x 30'  Shoulder Rows 3 x 10  Doorway Stretch 3 x 10  Wall Slides 3 x 10  CCW Wall Slides 3 x 10  CTJ Self Mob on Foam Roll 3 x 10    manual therapy techniques for 20 minutes, including:  Grd IV Joint Mobs to C7-T1  FDN and STM to L Upper Trap    Patient Education and Home Exercises       Education provided:   - HEP    Written Home Exercises Provided: Patient instructed to cont prior HEP. Exercises were reviewed and Michellei was able to demonstrate them prior to the end of the session.  Neci demonstrated good  understanding of the education provided. See Electronic Medical Record under Patient Instructions for exercises provided during therapy sessions    Assessment     Patient tolerated treatment session well and had no complaints of pain with therex or  current alcohol use of about 1.0 standard drink per week. She reports that she does not use drugs.    Family History  Ms.'s Vige  family history includes COPD in her mother; Cancer in her mother; Depression in her mother; Diabetes in her maternal grandfather, maternal grandmother, mother, paternal grandmother, and sister; Heart disease in her maternal grandfather; Hypertension in her mother; Lung cancer in her mother.        Medications and Allergies     Medications  Outpatient Medications Marked as Taking for the 6/2/23 encounter (Office Visit) with Angel Sher MD   Medication Sig Dispense Refill    aspirin 81 MG Chew Take 81 mg by mouth once daily.      cetirizine (ZYRTEC) 10 MG tablet Take 10 mg by mouth once daily.      cholecalciferol, vitamin D3, 125 mcg (5,000 unit) Tab Take 5,000 Units by mouth once daily.      desvenlafaxine succinate (PRISTIQ) 25 mg Tb24 Take 1 tablet by mouth every morning.      diclofenac (VOLTAREN) 50 MG EC tablet Take 1 tablet (50 mg total) by mouth 2 (two) times daily as needed (pain, after food). 60 tablet 5    estradioL (VAGIFEM) 10 mcg Tab Place 1 tablet vaginally twice a week.      FLUoxetine 40 MG capsule Take 40 mg by mouth.      folic acid (FOLVITE) 1 MG tablet Take 1 tablet (1 mg total) by mouth once daily. After food 30 tablet 5    leflunomide (ARAVA) 20 MG Tab Take 1 tablet (20 mg total) by mouth daily with dinner or evening meal. 30 tablet 11    levothyroxine (SYNTHROID) 50 MCG tablet TAKE ONE TABLET BY MOUTH ONCE DAILY 90 tablet 3    omeprazole (PRILOSEC) 40 MG capsule Take 1 capsule (40 mg total) by mouth every morning. 30 capsule 11    PREMARIN 0.45 mg tablet once daily.         Allergies  Review of patient's allergies indicates:   Allergen Reactions    Codeine Nausea And Vomiting     hyperemesis    Demerol [meperidine] Nausea And Vomiting     HYPEREMESIS    Plaquenil [hydroxychloroquine]      Blurry vision not checked by ophtalmology    Tramadol Nausea Only and  manual therapy. Pt had decreased pain and improved cervical ROM after manual therapy. Cont to progress POC as tolerated.    Neci Is progressing well towards her goals.   Patient prognosis is Excellent.     Patient will continue to benefit from skilled outpatient physical therapy to address the deficits listed in the problem list box on initial evaluation, provide pt/family education and to maximize pt's level of independence in the home and community environment.     Patient's spiritual, cultural and educational needs considered and pt agreeable to plan of care and goals.     Anticipated barriers to physical therapy: None    Goals:   Goals:  Short Term Goals (4 Weeks):   1. Pt will be independent with HEP to supplement PT in improving pain free cervical mobility  2. Pt will improve cervical AROM 10-15%  in all planes to improve cervical mobility for driving  3. Pt will improve UE MMTs by 1/2 grade in all planes to improve strength for lifting and carrying tasks.  4. Pt will demonstrate improved sitting posture to decrease pain experienced in head and neck.  Long Term Goals (8 Weeks):   1. Pt will improve FOTO to >/=40% to improve perceived limitation with changing and maintaining mobility.  2. Pt will improve cervical AROM to WNL in all planes to improve cervical mobility for driving   3. Pt will improve UE MMTs 1 grade in all planes to improve strength for lifting and carrying tasks.  4. Pt will report no pain with lifting >5-10 lbs to promote physical activity.   5. Pt will report no pain with cervical AROM in all planes to promote QOL    Plan     Plan of care Certification: 11/2/2023 to 1/2/2023.     Outpatient Physical Therapy 1-2 times weekly for 8 weeks to include the following interventions: Aquatic Therapy, Cervical/Lumbar Traction, Gait Training, Manual Therapy, Moist Heat/ Ice, Neuromuscular Re-ed, Patient Education, Self Care, Therapeutic Activities, Therapeutic Exercise, and Ultrasound.      Chris  Jose, PT          Chris Garcia, PT      Nausea And Vomiting       Physical Examination     Vitals:    06/02/23 1056   BP: (!) 140/80   Pulse:      Physical Exam  Constitutional:       Appearance: Normal appearance.   HENT:      Head: Normocephalic and atraumatic.      Right Ear: Tympanic membrane normal.      Left Ear: Tympanic membrane normal.      Nose: Nose normal.      Mouth/Throat:      Mouth: Mucous membranes are moist.   Eyes:      Extraocular Movements: Extraocular movements intact.      Pupils: Pupils are equal, round, and reactive to light.   Cardiovascular:      Rate and Rhythm: Normal rate and regular rhythm.      Pulses: Normal pulses.   Pulmonary:      Effort: Pulmonary effort is normal.      Breath sounds: Normal breath sounds.   Abdominal:      General: Abdomen is flat. Bowel sounds are normal.      Palpations: Abdomen is soft.   Musculoskeletal:         General: Normal range of motion.      Cervical back: Normal range of motion and neck supple.   Skin:     General: Skin is warm and dry.   Neurological:      General: No focal deficit present.      Mental Status: She is alert and oriented to person, place, and time.   Psychiatric:         Mood and Affect: Mood normal.         Behavior: Behavior normal.        No flowsheet data found.  Fall Risk Assessment - Outpatient 6/2/2023 2/7/2023 11/28/2022 10/27/2022 9/9/2022 5/16/2022 10/12/2021   Mobility Status Ambulatory Ambulatory Ambulatory Ambulatory Ambulatory Ambulatory Ambulatory   Number of falls 0 0 0 0 0 1 0   Identified as fall risk 0 0 0 0 0 0 0                Assessment and Plan (including Health Maintenance)      Problem List  Smart Sets  Document Outside HM   :    Plan:   Wellness examination    Gastroesophageal reflux disease, unspecified whether esophagitis present    Vitamin D deficiency    Hypothyroidism, unspecified type    Anxiety disorder, unspecified type    Encounter for screening for cardiovascular disorders     Calcium heart scan ordered  Her last 1 was in 2015 which was  0   She had a normal coronary angiogram in 2016   Begin low carb lifestyle  Trial on Ozempic for weight loss.    Revisit 1 year annual wellness.           Health Maintenance Due   Topic Date Due    TETANUS VACCINE  Never done    Shingles Vaccine (1 of 2) Never done    Pneumococcal Vaccines (Age 65+) (1 - PCV) Never done    COVID-19 Vaccine (3 - Moderna series) 05/06/2021    Mammogram  07/11/2023       Problem List Items Addressed This Visit          Psychiatric    Anxiety disorder, unspecified       Endocrine    Hypothyroid (Chronic)    Vitamin D deficiency (Chronic)       GI    GERD (gastroesophageal reflux disease) (Chronic)     Other Visit Diagnoses       Wellness examination    -  Primary    Encounter for screening for cardiovascular disorders                Health Maintenance Topics with due status: Not Due       Topic Last Completion Date    Influenza Vaccine 09/30/2009    Colorectal Cancer Screening 03/16/2018    DEXA Scan 07/26/2021    Hemoglobin A1c (Diabetic Prevention Screening) 06/02/2023    Lipid Panel 06/02/2023       Future Appointments   Date Time Provider Department Center   6/7/2023  8:00 AM Primitivo Andino MD [facility] Medicare Annual Wellness and Personalized Prevention Plan:   Fall Risk + Home Safety + Hearing Impairment + Depression Screen + Cognitive Impairment Screen + Health Risk Assessment all reviewed.         Advance Care Planning   I attest to discussing Advance Care Planning with patient and/or family member.  Education was provided including the importance of the Health Care Power of , Advance Directives, and/or [place] documentation.  The patient expressed understanding to the importance of this information and discussion.       Opioid Screening: Patient medication list reviewed, patient is not taking prescription opioids. Patient is not using additional opioids than prescribed. Patient is at low risk of substance abuse based on this opioid use history.         Signature:  Angel Matos MD  [facility]  [address]  [place] [id number]    Date of encounter: 6/2/23    Follow up in about 1 year (around 6/2/2024) for Medicare Wellness with labs. In addition to their scheduled follow up, the patient has also been instructed to follow up on as needed basis.

## 2024-01-29 ENCOUNTER — OFFICE VISIT (OUTPATIENT)
Dept: INTERNAL MEDICINE | Facility: CLINIC | Age: 69
End: 2024-01-29
Payer: MEDICARE

## 2024-01-29 VITALS
SYSTOLIC BLOOD PRESSURE: 126 MMHG | BODY MASS INDEX: 24.92 KG/M2 | WEIGHT: 146 LBS | HEIGHT: 64 IN | DIASTOLIC BLOOD PRESSURE: 70 MMHG | HEART RATE: 63 BPM | OXYGEN SATURATION: 99 %

## 2024-01-29 DIAGNOSIS — M06.00 SERONEGATIVE RHEUMATOID ARTHRITIS: ICD-10-CM

## 2024-01-29 DIAGNOSIS — E78.2 MIXED HYPERLIPIDEMIA: Primary | Chronic | ICD-10-CM

## 2024-01-29 PROCEDURE — 99214 OFFICE O/P EST MOD 30 MIN: CPT | Mod: ,,, | Performed by: INTERNAL MEDICINE

## 2024-01-29 RX ORDER — DESVENLAFAXINE SUCCINATE 25 MG/1
1 TABLET, EXTENDED RELEASE ORAL
COMMUNITY
Start: 2024-01-10 | End: 2024-05-19

## 2024-01-29 NOTE — PROGRESS NOTES
Angel Matos MD        PATIENT NAME: Beronica Olguin  : 1955  DATE: 24  MRN: [id number]      Billing Provider: Angel Matos MD  Level of Service: [place] OFFICE/OUTPT VISIT, [place] IV, 30-39 MIN  Patient PCP Information       Provider PCP Type    Angel Matos MD General            Reason for Visit / Chief Complaint: cholesterol and arthritis       Update PCP  Update Chief Complaint         History of Present Illness / Problem Focused Workflow     Beronica Olguin presents to the clinic with cholesterol and arthritis     Beronica is here for a few problems she has ongoing   1. Is her cholesterol level which tends to be very high   She can not tolerate any of the statins as well as Zetia   She has been Ozempic and her levels and weight are much better.  She is also having significant issue with her rheumatoid arthritis   Her rheumatologist here  has left and she does not have a rheumatologist.        Review of Systems   Review of Systems   Musculoskeletal:  Positive for arthralgias.        Medical / Social / Family History     Past Medical History:   Diagnosis Date    Depression     GERD (gastroesophageal reflux disease)     Thyroid nodule     Vitamin D deficiency        Past Surgical History:   Procedure Laterality Date    APPENDECTOMY  2019    AUGMENTATION OF BREAST      2014    BREAST SURGERY  2002     SECTION  1982&83    HYSTERECTOMY  2014    ALLI/BSO for PMB.     SC REMOVAL OF OVARY/TUBE(S)      TONSILLECTOMY         Social History  Ms. Olguin  reports that she has never smoked. She has never been exposed to tobacco smoke. She has never used smokeless tobacco. She reports current alcohol use of about 1.0 standard drink of alcohol per week. She reports that she does not use drugs.    Family History  Ms.'s Olguin  family history includes COPD in her mother; Cancer in her mother; Depression in her mother; Diabetes in her maternal grandfather, maternal grandmother, mother,  paternal grandmother, and sister; Heart disease in her maternal grandfather; Hypertension in her mother; Lung cancer in her mother; No Known Problems in her brother and father.    Medications and Allergies     Medications  Outpatient Medications Marked as Taking for the 1/29/24 encounter (Office Visit) with Angel Sher MD   Medication Sig Dispense Refill    desvenlafaxine succinate (PRISTIQ) 25 mg Tb24 Take 1 tablet by mouth.      FLUoxetine 40 MG capsule Take 40 mg by mouth Daily.      levothyroxine (SYNTHROID) 50 MCG tablet TAKE ONE TABLET BY MOUTH ONCE DAILY 90 tablet 3    omeprazole (PRILOSEC) 40 MG capsule Take 1 capsule (40 mg total) by mouth every morning. 90 capsule 3    PREMARIN 0.45 mg tablet once daily.      semaglutide (OZEMPIC) 0.25 mg or 0.5 mg (2 mg/3 mL) pen injector Inject 0.25 mg into the skin every 7 days. 4 each 0    upadacitinib (RINVOQ) 15 mg 24 hr tablet Take 1 tablet (15 mg total) by mouth once daily. 30 tablet 11    valACYclovir (VALTREX) 1000 MG tablet Take 1 tablet (1,000 mg total) by mouth every 8 (eight) hours. for 7 days 21 tablet 0    [DISCONTINUED] desvenlafaxine succinate (PRISTIQ) 50 MG Tb24 Take 50 mg by mouth.      [DISCONTINUED] ezetimibe (ZETIA) 10 mg tablet Take 1 tablet (10 mg total) by mouth once daily. 90 tablet 3       Allergies  Review of patient's allergies indicates:   Allergen Reactions    Codeine Nausea And Vomiting     hyperemesis    Demerol [meperidine] Nausea And Vomiting     HYPEREMESIS    Plaquenil [hydroxychloroquine]      Blurry vision not checked by ophtalmology    Tramadol Nausea Only and Nausea And Vomiting       Physical Examination     Vitals:    01/29/24 1101   BP: 126/70   Pulse: 63     Physical Exam  Constitutional:       Appearance: Normal appearance.   HENT:      Head: Normocephalic and atraumatic.      Right Ear: Tympanic membrane normal.      Left Ear: Tympanic membrane normal.      Nose: Nose normal.      Mouth/Throat:      Mouth: Mucous  membranes are moist.   Eyes:      Extraocular Movements: Extraocular movements intact.      Pupils: Pupils are equal, round, and reactive to light.   Cardiovascular:      Rate and Rhythm: Normal rate and regular rhythm.      Pulses: Normal pulses.   Pulmonary:      Effort: Pulmonary effort is normal.      Breath sounds: Normal breath sounds.   Abdominal:      General: Abdomen is flat. Bowel sounds are normal.      Palpations: Abdomen is soft.   Musculoskeletal:         General: Normal range of motion.      Cervical back: Normal range of motion and neck supple.      Comments: Arthritis inflammation of the right hand and knuckles.   Skin:     General: Skin is warm and dry.   Neurological:      General: No focal deficit present.      Mental Status: She is alert and oriented to person, place, and time.   Psychiatric:         Mood and Affect: Mood normal.         Behavior: Behavior normal.          Assessment and Plan (including Health Maintenance)      Problem List  [employer]  Document Outside HM   :    Plan:    ICD-10-CM ICD-9-CM   1. Mixed hyperlipidemia  E78.2 272.2   2. Seronegative rheumatoid arthritis  M06.00 714.0       Problem List Items Addressed This Visit          Cardiac/Vascular    Hyperlipidemia, unspecified - Primary (Chronic)     Begin Repatha injections every 2 weeks   Prescription sent   PA to be done later after prescription receive            Immunology/Multi System    Seronegative rheumatoid arthritis (Chronic)     Referral to rheumatologist Dr. Thakkar   Rheumatoid factor sed rate C-reactive protein to be tested at this time.                   Health Maintenance Due   Topic Date Due    TETANUS VACCINE  Never done    Shingles Vaccine (1 of 2) Never done    RSV Vaccine (Age 60+ and Pregnant patients) (1 - 1-dose 60+ series) Never done    Influenza Vaccine (1) 09/01/2023    COVID-19 Vaccine (3 - 2023-24 season) 09/01/2023       Problem List Items Addressed This Visit          Cardiac/Vascular     Hyperlipidemia, unspecified - Primary (Chronic)    Current Assessment & Plan     Begin Repatha injections every 2 weeks   Prescription sent   PA to be done later after prescription receive            Immunology/Multi System    Seronegative rheumatoid arthritis (Chronic)    Current Assessment & Plan     Referral to rheumatologist Dr. Thakkar   Rheumatoid factor sed rate C-reactive protein to be tested at this time.            Health Maintenance Topics with due status: Not Due       Topic Last Completion Date    Colorectal Cancer Screening 03/16/2018    DEXA Scan 07/26/2021    Hemoglobin A1c (Diabetic Prevention Screening) 06/02/2023    Mammogram 07/24/2023    Lipid Panel 09/20/2023       Future Appointments   Date Time Provider Department Center   6/11/2024  8:00 AM Angel Sher MD [place] [address]      I spent a total of 45 minutes on the day of the visit.This includes face to face time and non-face to face time preparing to see the patient (eg, review of tests), obtaining and/or reviewing separately obtained history, documenting clinical information in the electronic or other health record, independently interpreting results and communicating results to the patient/family/caregiver, or care coordinator.        Signature:  Angel Sher MD  [facility]  [address] [facility] [id number]    Date of encounter: 1/29/24

## 2024-01-29 NOTE — ASSESSMENT & PLAN NOTE
Referral to rheumatologist Dr. Thakkar   Rheumatoid factor sed rate C-reactive protein to be tested at this time.

## 2024-01-29 NOTE — ASSESSMENT & PLAN NOTE
Begin Repatha injections every 2 weeks   Prescription sent   PA to be done later after prescription receive

## 2024-01-30 ENCOUNTER — LAB VISIT (OUTPATIENT)
Dept: LAB | Facility: HOSPITAL | Age: 69
End: 2024-01-30
Attending: INTERNAL MEDICINE
Payer: MEDICARE

## 2024-01-30 DIAGNOSIS — E55.9 VITAMIN D DEFICIENCY: Chronic | ICD-10-CM

## 2024-01-30 DIAGNOSIS — E78.2 MIXED HYPERLIPIDEMIA: Chronic | ICD-10-CM

## 2024-01-30 DIAGNOSIS — K21.9 GASTROESOPHAGEAL REFLUX DISEASE, UNSPECIFIED WHETHER ESOPHAGITIS PRESENT: ICD-10-CM

## 2024-01-30 DIAGNOSIS — M06.00 SERONEGATIVE RHEUMATOID ARTHRITIS: ICD-10-CM

## 2024-01-30 LAB
ALBUMIN SERPL-MCNC: 4.1 G/DL (ref 3.4–4.8)
ALBUMIN/GLOB SERPL: 1.8 RATIO (ref 1.1–2)
ALP SERPL-CCNC: 34 UNIT/L (ref 40–150)
ALT SERPL-CCNC: 10 UNIT/L (ref 0–55)
APPEARANCE UR: CLEAR
AST SERPL-CCNC: 18 UNIT/L (ref 5–34)
BACTERIA #/AREA URNS AUTO: ABNORMAL /HPF
BASOPHILS # BLD AUTO: 0.04 X10(3)/MCL
BASOPHILS NFR BLD AUTO: 1.2 %
BILIRUB SERPL-MCNC: 0.5 MG/DL
BILIRUB UR QL STRIP.AUTO: NEGATIVE
BUN SERPL-MCNC: 14.1 MG/DL (ref 9.8–20.1)
CALCIUM SERPL-MCNC: 9 MG/DL (ref 8.4–10.2)
CHLORIDE SERPL-SCNC: 106 MMOL/L (ref 98–107)
CHOLEST SERPL-MCNC: 329 MG/DL
CHOLEST/HDLC SERPL: 3 {RATIO} (ref 0–5)
CO2 SERPL-SCNC: 27 MMOL/L (ref 23–31)
COLOR UR AUTO: ABNORMAL
CREAT SERPL-MCNC: 0.81 MG/DL (ref 0.55–1.02)
CRP SERPL-MCNC: <1 MG/L
DEPRECATED CALCIDIOL+CALCIFEROL SERPL-MC: 76 NG/ML (ref 30–80)
EOSINOPHIL # BLD AUTO: 0.09 X10(3)/MCL (ref 0–0.9)
EOSINOPHIL NFR BLD AUTO: 2.6 %
ERYTHROCYTE [DISTWIDTH] IN BLOOD BY AUTOMATED COUNT: 13.3 % (ref 11.5–17)
ERYTHROCYTE [SEDIMENTATION RATE] IN BLOOD: 1 MM/HR (ref 0–20)
GFR SERPLBLD CREATININE-BSD FMLA CKD-EPI: >60 MLS/MIN/1.73/M2
GLOBULIN SER-MCNC: 2.3 GM/DL (ref 2.4–3.5)
GLUCOSE SERPL-MCNC: 95 MG/DL (ref 82–115)
GLUCOSE UR QL STRIP.AUTO: NORMAL
HCT VFR BLD AUTO: 39.5 % (ref 37–47)
HDLC SERPL-MCNC: 102 MG/DL (ref 35–60)
HGB BLD-MCNC: 13.4 G/DL (ref 12–16)
IMM GRANULOCYTES # BLD AUTO: 0.01 X10(3)/MCL (ref 0–0.04)
IMM GRANULOCYTES NFR BLD AUTO: 0.3 %
KETONES UR QL STRIP.AUTO: NEGATIVE
LDLC SERPL CALC-MCNC: 210 MG/DL (ref 50–140)
LEUKOCYTE ESTERASE UR QL STRIP.AUTO: NEGATIVE
LYMPHOCYTES # BLD AUTO: 1.11 X10(3)/MCL (ref 0.6–4.6)
LYMPHOCYTES NFR BLD AUTO: 32.2 %
MCH RBC QN AUTO: 30.9 PG (ref 27–31)
MCHC RBC AUTO-ENTMCNC: 33.9 G/DL (ref 33–36)
MCV RBC AUTO: 91.2 FL (ref 80–94)
MONOCYTES # BLD AUTO: 0.36 X10(3)/MCL (ref 0.1–1.3)
MONOCYTES NFR BLD AUTO: 10.4 %
MUCOUS THREADS URNS QL MICRO: ABNORMAL /LPF
NEUTROPHILS # BLD AUTO: 1.84 X10(3)/MCL (ref 2.1–9.2)
NEUTROPHILS NFR BLD AUTO: 53.3 %
NITRITE UR QL STRIP.AUTO: NEGATIVE
NRBC BLD AUTO-RTO: 0 %
PH UR STRIP.AUTO: 6 [PH]
PLATELET # BLD AUTO: 192 X10(3)/MCL (ref 130–400)
PMV BLD AUTO: 9.2 FL (ref 7.4–10.4)
POTASSIUM SERPL-SCNC: 4.8 MMOL/L (ref 3.5–5.1)
PROT SERPL-MCNC: 6.4 GM/DL (ref 5.8–7.6)
PROT UR QL STRIP.AUTO: NEGATIVE
RBC # BLD AUTO: 4.33 X10(6)/MCL (ref 4.2–5.4)
RBC #/AREA URNS AUTO: ABNORMAL /HPF
RBC UR QL AUTO: NEGATIVE
SODIUM SERPL-SCNC: 142 MMOL/L (ref 136–145)
SP GR UR STRIP.AUTO: 1.02 (ref 1–1.03)
SQUAMOUS #/AREA URNS LPF: ABNORMAL /HPF
TRIGL SERPL-MCNC: 83 MG/DL (ref 37–140)
UROBILINOGEN UR STRIP-ACNC: NORMAL
VLDLC SERPL CALC-MCNC: 17 MG/DL
WBC # SPEC AUTO: 3.45 X10(3)/MCL (ref 4.5–11.5)
WBC #/AREA URNS AUTO: ABNORMAL /HPF

## 2024-01-30 PROCEDURE — 85025 COMPLETE CBC W/AUTO DIFF WBC: CPT

## 2024-01-30 PROCEDURE — 82306 VITAMIN D 25 HYDROXY: CPT

## 2024-01-30 PROCEDURE — 85652 RBC SED RATE AUTOMATED: CPT

## 2024-01-30 PROCEDURE — 36415 COLL VENOUS BLD VENIPUNCTURE: CPT

## 2024-01-30 PROCEDURE — 80053 COMPREHEN METABOLIC PANEL: CPT

## 2024-01-30 PROCEDURE — 86431 RHEUMATOID FACTOR QUANT: CPT

## 2024-01-30 PROCEDURE — 81001 URINALYSIS AUTO W/SCOPE: CPT

## 2024-01-30 PROCEDURE — 86140 C-REACTIVE PROTEIN: CPT

## 2024-01-30 PROCEDURE — 80061 LIPID PANEL: CPT

## 2024-01-31 ENCOUNTER — TELEPHONE (OUTPATIENT)
Dept: INTERNAL MEDICINE | Facility: CLINIC | Age: 69
End: 2024-01-31
Payer: MEDICARE

## 2024-01-31 LAB — RHEUMATOID FACTOR IGA QUANTITATIVE (OLG): 4.3 IU/ML

## 2024-02-01 DIAGNOSIS — E78.2 MIXED HYPERLIPIDEMIA: Primary | Chronic | ICD-10-CM

## 2024-03-10 ENCOUNTER — OFFICE VISIT (OUTPATIENT)
Dept: URGENT CARE | Facility: CLINIC | Age: 69
End: 2024-03-10
Payer: MEDICARE

## 2024-03-10 VITALS
HEART RATE: 82 BPM | RESPIRATION RATE: 16 BRPM | BODY MASS INDEX: 24.75 KG/M2 | SYSTOLIC BLOOD PRESSURE: 112 MMHG | WEIGHT: 145 LBS | OXYGEN SATURATION: 98 % | DIASTOLIC BLOOD PRESSURE: 64 MMHG | TEMPERATURE: 98 F | HEIGHT: 64 IN

## 2024-03-10 DIAGNOSIS — T15.91XA FB EYE, RIGHT, INITIAL ENCOUNTER: Primary | ICD-10-CM

## 2024-03-10 PROCEDURE — 99214 OFFICE O/P EST MOD 30 MIN: CPT | Mod: ,,, | Performed by: FAMILY MEDICINE

## 2024-03-10 NOTE — PROGRESS NOTES
"Subjective:      Patient ID: Beronica Olguin is a 68 y.o. female.    Vitals:  height is 5' 4" (1.626 m) and weight is 65.8 kg (145 lb). Her temperature is 97.8 °F (36.6 °C). Her blood pressure is 112/64 and her pulse is 82. Her respiration is 16 and oxygen saturation is 98%.     Chief Complaint: Eye Problem (Feels like something is in right eye, painful (scratchy feeling) x last night. )    R eye irritation with FB sensation over the last 24 hours.         Constitution: Negative for appetite change, sweating and fever.   Eyes:  Positive for foreign body in eye, eye itching, eye pain and eye redness.   Gastrointestinal:  Negative for nausea and vomiting.   Neurological:  Negative for dizziness.      Objective:     Physical Exam   Eyes: Pupils are equal, round, and reactive to light.      Comments: Lidocaine drops placed.  FB in upper lid removed with Qtip.    Cardiovascular: Normal rate.   Pulmonary/Chest: Effort normal.   Abdominal: Normal appearance.   Neurological: no focal deficit. She is alert.   Psychiatric: Mood normal.   Nursing note and vitals reviewed.      Assessment:     1. FB eye, right, initial encounter        Plan:       FB eye, right, initial encounter                    "

## 2024-04-25 DIAGNOSIS — E11.9 TYPE 2 DIABETES MELLITUS WITHOUT COMPLICATION, UNSPECIFIED WHETHER LONG TERM INSULIN USE: ICD-10-CM

## 2024-05-06 DIAGNOSIS — E03.9 HYPOTHYROIDISM, UNSPECIFIED TYPE: Chronic | ICD-10-CM

## 2024-05-06 RX ORDER — LEVOTHYROXINE SODIUM 50 UG/1
50 TABLET ORAL
Qty: 90 TABLET | Refills: 3 | Status: SHIPPED | OUTPATIENT
Start: 2024-05-06

## 2024-05-19 ENCOUNTER — OFFICE VISIT (OUTPATIENT)
Dept: URGENT CARE | Facility: CLINIC | Age: 69
End: 2024-05-19
Payer: MEDICARE

## 2024-05-19 VITALS
HEART RATE: 59 BPM | BODY MASS INDEX: 24.75 KG/M2 | RESPIRATION RATE: 18 BRPM | HEIGHT: 64 IN | WEIGHT: 145 LBS | DIASTOLIC BLOOD PRESSURE: 72 MMHG | SYSTOLIC BLOOD PRESSURE: 114 MMHG | OXYGEN SATURATION: 100 % | TEMPERATURE: 98 F

## 2024-05-19 DIAGNOSIS — H10.9 CONJUNCTIVITIS OF RIGHT EYE, UNSPECIFIED CONJUNCTIVITIS TYPE: Primary | ICD-10-CM

## 2024-05-19 PROCEDURE — 99213 OFFICE O/P EST LOW 20 MIN: CPT | Mod: ,,, | Performed by: FAMILY MEDICINE

## 2024-05-19 RX ORDER — OFLOXACIN 3 MG/ML
1 SOLUTION/ DROPS OPHTHALMIC 4 TIMES DAILY
Qty: 5 ML | Refills: 0 | Status: SHIPPED | OUTPATIENT
Start: 2024-05-19

## 2024-05-19 RX ORDER — AZATHIOPRINE 50 MG/1
50 TABLET ORAL 2 TIMES DAILY
COMMUNITY
Start: 2024-02-27 | End: 2024-05-19

## 2024-05-19 NOTE — PROGRESS NOTES
"Subjective:      Patient ID: Beronica Olguin is a 68 y.o. female.    Vitals:  height is 5' 4" (1.626 m) and weight is 65.8 kg (145 lb). Her oral temperature is 97.5 °F (36.4 °C). Her blood pressure is 114/72 and her pulse is 59 (abnormal). Her respiration is 18 and oxygen saturation is 100%.     Chief Complaint: Foreign Body in Eye     Patient is a 68 y.o. female who presents to urgent care with complaints of possible foreign body in right eye x yesterday. Vision 20/30 on snellen chart. Alleviating factors include OTC eye drops with no relief. States that she woke up with irritation.      Constitution: Negative.   HENT: Negative.     Eyes:  Positive for foreign body in eye, eye discharge, eye pain, eye redness and eyelid swelling.      Objective:     Physical Exam   Constitutional: She is oriented to person, place, and time. She appears well-developed. She is cooperative.  Non-toxic appearance. She does not appear ill. No distress.   HENT:   Head: Normocephalic and atraumatic.   Ears:   Right Ear: Hearing, tympanic membrane, external ear and ear canal normal.   Left Ear: Hearing, tympanic membrane, external ear and ear canal normal.   Nose: Nose normal. No mucosal edema, rhinorrhea or nasal deformity. No epistaxis. Right sinus exhibits no maxillary sinus tenderness and no frontal sinus tenderness. Left sinus exhibits no maxillary sinus tenderness and no frontal sinus tenderness.   Mouth/Throat: Uvula is midline, oropharynx is clear and moist and mucous membranes are normal. No trismus in the jaw. Normal dentition. No uvula swelling. No oropharyngeal exudate, posterior oropharyngeal edema or posterior oropharyngeal erythema.   Eyes: Conjunctivae and lids are normal. No scleral icterus. Right pupil is round, reactive and not sluggish.   Slit lamp exam:       The right eye shows no corneal abrasion, no corneal flare, no corneal ulcer, no foreign body, no hyphema, no hypopyon, no fluorescein uptake, no anterior " chamber bulge and no photophobia. right eye Valery exam negative    Neck: Trachea normal and phonation normal. Neck supple. No edema present. No erythema present. No neck rigidity present.   Cardiovascular: Normal rate, regular rhythm, normal heart sounds and normal pulses.   Pulmonary/Chest: Effort normal and breath sounds normal. No respiratory distress. She has no decreased breath sounds. She has no rhonchi.   Abdominal: Normal appearance.   Musculoskeletal: Normal range of motion.         General: No deformity. Normal range of motion.   Neurological: She is alert and oriented to person, place, and time. She exhibits normal muscle tone. Coordination normal.   Skin: Skin is warm, dry, intact, not diaphoretic and not pale.   Psychiatric: Her speech is normal and behavior is normal. Judgment and thought content normal.   Nursing note and vitals reviewed.      Assessment:     1. Conjunctivitis of right eye, unspecified conjunctivitis type        Plan:       Conjunctivitis of right eye, unspecified conjunctivitis type  -     ofloxacin (OCUFLOX) 0.3 % ophthalmic solution; Place 1 drop into the right eye 4 (four) times daily.  Dispense: 5 mL; Refill: 0    Bacterial versus allergic versus viral.  Treatment as above.  Return to clinic/follow up with PCP should symptoms fail to improve or worsen.

## 2024-06-18 DIAGNOSIS — E78.2 MIXED HYPERLIPIDEMIA: Primary | ICD-10-CM

## 2024-06-18 DIAGNOSIS — E03.9 HYPOTHYROIDISM, UNSPECIFIED TYPE: ICD-10-CM

## 2024-06-18 DIAGNOSIS — R30.0 DYSURIA: ICD-10-CM

## 2024-06-18 DIAGNOSIS — E55.9 VITAMIN D DEFICIENCY: ICD-10-CM

## 2024-06-18 DIAGNOSIS — E78.5 HYPERLIPIDEMIA, UNSPECIFIED HYPERLIPIDEMIA TYPE: ICD-10-CM

## 2024-06-18 DIAGNOSIS — M60.9 STATIN-INDUCED MYOSITIS: ICD-10-CM

## 2024-06-18 DIAGNOSIS — T46.6X5A STATIN-INDUCED MYOSITIS: ICD-10-CM

## 2024-06-18 DIAGNOSIS — I10 HYPERTENSION, UNSPECIFIED TYPE: ICD-10-CM

## 2024-06-19 ENCOUNTER — LAB VISIT (OUTPATIENT)
Dept: LAB | Facility: HOSPITAL | Age: 69
End: 2024-06-19
Attending: INTERNAL MEDICINE
Payer: MEDICARE

## 2024-06-19 DIAGNOSIS — E55.9 VITAMIN D DEFICIENCY: ICD-10-CM

## 2024-06-19 DIAGNOSIS — R30.0 DYSURIA: ICD-10-CM

## 2024-06-19 DIAGNOSIS — T46.6X5A STATIN-INDUCED MYOSITIS: ICD-10-CM

## 2024-06-19 DIAGNOSIS — E03.9 HYPOTHYROIDISM, UNSPECIFIED TYPE: ICD-10-CM

## 2024-06-19 DIAGNOSIS — E78.2 MIXED HYPERLIPIDEMIA: ICD-10-CM

## 2024-06-19 DIAGNOSIS — M60.9 STATIN-INDUCED MYOSITIS: ICD-10-CM

## 2024-06-19 DIAGNOSIS — I10 HYPERTENSION, UNSPECIFIED TYPE: ICD-10-CM

## 2024-06-19 DIAGNOSIS — E78.5 HYPERLIPIDEMIA, UNSPECIFIED HYPERLIPIDEMIA TYPE: ICD-10-CM

## 2024-06-19 LAB
ALBUMIN SERPL-MCNC: 3.9 G/DL (ref 3.4–4.8)
ALBUMIN/GLOB SERPL: 1.6 RATIO (ref 1.1–2)
ALP SERPL-CCNC: 45 UNIT/L (ref 40–150)
ALT SERPL-CCNC: 10 UNIT/L (ref 0–55)
ANION GAP SERPL CALC-SCNC: 7 MEQ/L
AST SERPL-CCNC: 17 UNIT/L (ref 5–34)
BASOPHILS # BLD AUTO: 0.03 X10(3)/MCL
BASOPHILS NFR BLD AUTO: 0.7 %
BILIRUB SERPL-MCNC: 0.4 MG/DL
BUN SERPL-MCNC: 16.2 MG/DL (ref 9.8–20.1)
CALCIUM SERPL-MCNC: 9.1 MG/DL (ref 8.4–10.2)
CHLORIDE SERPL-SCNC: 104 MMOL/L (ref 98–107)
CHOLEST SERPL-MCNC: 299 MG/DL
CHOLEST/HDLC SERPL: 3 {RATIO} (ref 0–5)
CO2 SERPL-SCNC: 31 MMOL/L (ref 23–31)
CREAT SERPL-MCNC: 0.83 MG/DL (ref 0.55–1.02)
CREAT/UREA NIT SERPL: 20
EOSINOPHIL # BLD AUTO: 0.16 X10(3)/MCL (ref 0–0.9)
EOSINOPHIL NFR BLD AUTO: 3.5 %
ERYTHROCYTE [DISTWIDTH] IN BLOOD BY AUTOMATED COUNT: 12.8 % (ref 11.5–17)
GFR SERPLBLD CREATININE-BSD FMLA CKD-EPI: >60 ML/MIN/1.73/M2
GLOBULIN SER-MCNC: 2.4 GM/DL (ref 2.4–3.5)
GLUCOSE SERPL-MCNC: 97 MG/DL (ref 82–115)
HCT VFR BLD AUTO: 42.9 % (ref 37–47)
HDLC SERPL-MCNC: 92 MG/DL (ref 35–60)
HGB BLD-MCNC: 14.2 G/DL (ref 12–16)
IMM GRANULOCYTES # BLD AUTO: 0.02 X10(3)/MCL (ref 0–0.04)
IMM GRANULOCYTES NFR BLD AUTO: 0.4 %
LDLC SERPL CALC-MCNC: 195 MG/DL (ref 50–140)
LYMPHOCYTES # BLD AUTO: 1.49 X10(3)/MCL (ref 0.6–4.6)
LYMPHOCYTES NFR BLD AUTO: 32.4 %
MCH RBC QN AUTO: 29.6 PG (ref 27–31)
MCHC RBC AUTO-ENTMCNC: 33.1 G/DL (ref 33–36)
MCV RBC AUTO: 89.6 FL (ref 80–94)
MONOCYTES # BLD AUTO: 0.41 X10(3)/MCL (ref 0.1–1.3)
MONOCYTES NFR BLD AUTO: 8.9 %
NEUTROPHILS # BLD AUTO: 2.49 X10(3)/MCL (ref 2.1–9.2)
NEUTROPHILS NFR BLD AUTO: 54.1 %
NRBC BLD AUTO-RTO: 0 %
PLATELET # BLD AUTO: 244 X10(3)/MCL (ref 130–400)
PMV BLD AUTO: 9.5 FL (ref 7.4–10.4)
POTASSIUM SERPL-SCNC: 5.2 MMOL/L (ref 3.5–5.1)
PROT SERPL-MCNC: 6.3 GM/DL (ref 5.8–7.6)
RBC # BLD AUTO: 4.79 X10(6)/MCL (ref 4.2–5.4)
SODIUM SERPL-SCNC: 142 MMOL/L (ref 136–145)
T3FREE SERPL-MCNC: 2.31 PG/ML (ref 1.58–3.91)
T4 FREE SERPL-MCNC: 1.01 NG/DL (ref 0.7–1.48)
TRIGL SERPL-MCNC: 61 MG/DL (ref 37–140)
TSH SERPL-ACNC: 1.42 UIU/ML (ref 0.35–4.94)
VLDLC SERPL CALC-MCNC: 12 MG/DL
WBC # BLD AUTO: 4.6 X10(3)/MCL (ref 4.5–11.5)

## 2024-06-19 PROCEDURE — 84443 ASSAY THYROID STIM HORMONE: CPT

## 2024-06-19 PROCEDURE — 84481 FREE ASSAY (FT-3): CPT

## 2024-06-19 PROCEDURE — 80061 LIPID PANEL: CPT

## 2024-06-19 PROCEDURE — 80053 COMPREHEN METABOLIC PANEL: CPT

## 2024-06-19 PROCEDURE — 85025 COMPLETE CBC W/AUTO DIFF WBC: CPT

## 2024-06-19 PROCEDURE — 84439 ASSAY OF FREE THYROXINE: CPT

## 2024-06-19 PROCEDURE — 36415 COLL VENOUS BLD VENIPUNCTURE: CPT

## 2024-06-24 PROBLEM — Z00.00 ENCOUNTER FOR ANNUAL WELLNESS EXAM IN MEDICARE PATIENT: Status: ACTIVE | Noted: 2024-06-24

## 2024-06-25 ENCOUNTER — OFFICE VISIT (OUTPATIENT)
Dept: INTERNAL MEDICINE | Facility: CLINIC | Age: 69
End: 2024-06-25
Payer: MEDICARE

## 2024-06-25 ENCOUNTER — LAB VISIT (OUTPATIENT)
Dept: LAB | Facility: HOSPITAL | Age: 69
End: 2024-06-25
Attending: INTERNAL MEDICINE
Payer: MEDICARE

## 2024-06-25 VITALS
WEIGHT: 147 LBS | BODY MASS INDEX: 25.1 KG/M2 | HEIGHT: 64 IN | DIASTOLIC BLOOD PRESSURE: 70 MMHG | OXYGEN SATURATION: 98 % | HEART RATE: 71 BPM | SYSTOLIC BLOOD PRESSURE: 120 MMHG | RESPIRATION RATE: 16 BRPM

## 2024-06-25 DIAGNOSIS — M06.00 SERONEGATIVE RHEUMATOID ARTHRITIS: Chronic | ICD-10-CM

## 2024-06-25 DIAGNOSIS — E03.9 HYPOTHYROIDISM, UNSPECIFIED TYPE: Chronic | ICD-10-CM

## 2024-06-25 DIAGNOSIS — Z00.00 ENCOUNTER FOR ANNUAL WELLNESS EXAM IN MEDICARE PATIENT: Primary | ICD-10-CM

## 2024-06-25 DIAGNOSIS — E55.9 VITAMIN D DEFICIENCY: Chronic | ICD-10-CM

## 2024-06-25 DIAGNOSIS — E03.9 ACQUIRED HYPOTHYROIDISM: Chronic | ICD-10-CM

## 2024-06-25 DIAGNOSIS — K21.9 GASTROESOPHAGEAL REFLUX DISEASE, UNSPECIFIED WHETHER ESOPHAGITIS PRESENT: Chronic | ICD-10-CM

## 2024-06-25 LAB
CRP SERPL-MCNC: <1 MG/L
ERYTHROCYTE [SEDIMENTATION RATE] IN BLOOD: 10 MM/HR (ref 0–20)

## 2024-06-25 PROCEDURE — 85652 RBC SED RATE AUTOMATED: CPT

## 2024-06-25 PROCEDURE — 36415 COLL VENOUS BLD VENIPUNCTURE: CPT

## 2024-06-25 PROCEDURE — G0439 PPPS, SUBSEQ VISIT: HCPCS | Mod: ,,, | Performed by: INTERNAL MEDICINE

## 2024-06-25 PROCEDURE — 86235 NUCLEAR ANTIGEN ANTIBODY: CPT

## 2024-06-25 PROCEDURE — 86140 C-REACTIVE PROTEIN: CPT

## 2024-06-25 RX ORDER — FLUOXETINE HYDROCHLORIDE 20 MG/1
20 CAPSULE ORAL DAILY
COMMUNITY
Start: 2024-06-18

## 2024-06-25 RX ORDER — OMEPRAZOLE 40 MG/1
40 CAPSULE, DELAYED RELEASE ORAL EVERY MORNING
Qty: 90 CAPSULE | Refills: 3 | Status: SHIPPED | OUTPATIENT
Start: 2024-06-25 | End: 2025-06-25

## 2024-06-25 NOTE — PROGRESS NOTES
Angel Matos MD        PATIENT NAME: Beronica Olguin  : 1955  DATE: 24  MRN: [id number]      Patient Care Team:  Angel Matos MD as PCP - General (Internal Medicine)  Imaging, Lamont Cantrell MD as Consulting Physician (Gastroenterology)  Roger Baker MD as Consulting Physician (Obstetrics and Gynecology)       Billing Provider: Angel Matos MD  Level of Service: [place] MED[place] ANNUAL WELLNESS SUBSEQUENT VISIT  Patient PCP Information       Provider PCP Type    Angel Matos MD General            Reason for Visit / Chief Complaint: Medicare AWV       Update PCP  Update Chief Complaint         History of Present Illness / Problem Focused Workflow     Beronica Olguin presents to the clinic with Medicare AWV     Is here for wellness exam   Her biggest issue is her arthritis in her hands she can hardly g a tennis racquet anymore   She saw Dr. Thakkar who felt she did not have seronegative rheumatoid arthritis she also saw Dr. Victor the hand doctor she has got an appointment to see a rheumatologist in [facility] of the opinion.        Review of Systems   Review of Systems   Constitutional: Negative.    HENT: Negative.     Eyes: Negative.    Respiratory: Negative.     Cardiovascular: Negative.    Gastrointestinal: Negative.    Endocrine: Negative.    Genitourinary: Negative.    Musculoskeletal:  Positive for arthralgias.   Integumentary:  Negative.   Neurological: Negative.    Psychiatric/Behavioral: Negative.          Patient Reported Health Risk Assessment  What [age] 65-69  Are you male or female?: Female  During the past four weeks, how much have you been bothered by emotional problems such as feeling anxious, depressed, irritable, sad, or downhearted and blue?: Not at all  During the past five weeks, has your physical and/or emotional health limited your social activities with family, friends, neighbors, or groups?: Not at all  During the  past four weeks, how much bodily pain have you generally had?: No pain  During the past four weeks, was someone available to help if you needed and wanted help?: Yes, as much as I wanted  During the past four weeks, what was the hardest physical activity you could do for at least two minutes?: Moderate  Can you get to places out of walking distance without help?  (For example, can you travel alone on buses or taxis, or drive your own car?): Yes  Can you go shopping for groceries or clothes without someone's help?: Yes  Can you prepare your own meals?: Yes  Can you do your own housework without help?: Yes  Because of any health problems, do you need the help of another person with your personal care needs such as eating, bathing, dressing, or getting around the house?: No  Can you handle your own money without help?: Yes  During the past four weeks, how would you rate your health in general?: Very good  How have things been going for you during the past four weeks?: Very well  Are you having difficulties driving your car?: No  Do you always fasten your seat belt when you are in a car?: Yes, usually  How often in the past four weeks have you been bothered by falling or dizzy when standing up?: Never  How often in the past four weeks have you been bothered by sexual problems?: Never  How often in the past four weeks have you been bothered by trouble eating well?: Never  How often in the past four weeks have you been bothered by teeth or denture problems?: Never  How often in the past four weeks have you been bothered with problems using the telephone?: Never  How often in the past four weeks have you been bothered by tiredness or fatigue?: Never  Have you fallen two or more times in the past year?: No  Are you afraid of falling?: No  Are you a smoker?: No  During the past four weeks, how many drinks of wine, beer, or other alcoholic beverages did you have?: One drink or less per week  Do you exercise for about 20  minutes three or more days a week?: Yes, most of the time  Have you been given any information to help you with hazards in your house that might hurt you?: Yes  Have you been given any information to help you with keeping track of your medications?: Yes  How often do you have trouble taking medicines the way you've been told to take them?: I always take them as prescribed  How confident are you that you can control and manage most of your health problems?: Very confident  What is your race? (Check all that apply.):     Medical / Social / Family History     Past Medical History:   Diagnosis Date    Depression     GERD (gastroesophageal reflux disease)     Hyperlipidemia     Thyroid nodule     Vitamin D deficiency        Past Surgical History:   Procedure Laterality Date    APPENDECTOMY      AUGMENTATION OF BREAST          BREAST SURGERY       SECTION  &    HYSTERECTOMY      ALLI/BSO for PMB.     ID REMOVAL OF OVARY/TUBE(S)      TONSILLECTOMY         Social History  Ms. Olguin  reports that she has never smoked. She has never been exposed to tobacco smoke. She has never used smokeless tobacco. She reports current alcohol use of about 1.0 standard drink of alcohol per week. She reports that she does not use drugs.    Family History  Ms.'s Olguin  family history includes COPD in her mother; Cancer in her mother; Depression in her mother; Diabetes in her maternal grandfather, maternal grandmother, mother, paternal grandmother, and sister; Heart disease in her maternal grandfather; Hypertension in her mother; Lung cancer in her mother; No Known Problems in her brother and father.        Medications and Allergies     Medications  Outpatient Medications Marked as Taking for the 24 encounter (Office Visit) with Angel Sher MD   Medication Sig Dispense Refill    FLUoxetine 20 MG capsule Take 20 mg by mouth once daily.      levothyroxine (SYNTHROID) 50 MCG tablet TAKE ONE TABLET  ONCE DAILY 90 tablet 3    omeprazole (PRILOSEC) 40 MG capsule Take 1 capsule (40 mg total) by mouth every morning. 90 capsule 3    PREMARIN 0.45 mg tablet once daily.      semaglutide (OZEMPIC) 0.25 mg or 0.5 mg (2 mg/3 mL) pen injector Inject 0.25 mg into the skin every 7 days. 4 each 3       Allergies  Review of patient's allergies indicates:   Allergen Reactions    Codeine Nausea And Vomiting     hyperemesis    Demerol [meperidine] Nausea And Vomiting     HYPEREMESIS    Plaquenil [hydroxychloroquine]      Blurry vision not checked by ophtalmology    Tramadol Nausea Only and Nausea And Vomiting       Physical Examination     Vitals:    06/25/24 0835   BP: 120/70   Pulse: 71   Resp: 16     Physical Exam  Constitutional:       Appearance: Normal appearance.   HENT:      Head: Normocephalic and atraumatic.      Right Ear: Tympanic membrane normal.      Left Ear: Tympanic membrane normal.      Nose: Nose normal.      Mouth/Throat:      Mouth: Mucous membranes are moist.   Eyes:      Extraocular Movements: Extraocular movements intact.      Pupils: Pupils are equal, round, and reactive to light.   Cardiovascular:      Rate and Rhythm: Normal rate and regular rhythm.      Pulses: Normal pulses.   Pulmonary:      Effort: Pulmonary effort is normal.      Breath sounds: Normal breath sounds.   Abdominal:      General: Abdomen is flat. Bowel sounds are normal.      Palpations: Abdomen is soft.   Musculoskeletal:         General: Normal range of motion.      Cervical back: Normal range of motion and neck supple.   Skin:     General: Skin is warm and dry.   Neurological:      General: No focal deficit present.      Mental Status: She is alert and oriented to person, place, and time.   Psychiatric:         Mood and Affect: Mood normal.         Behavior: Behavior normal.               No data to display                  6/25/2024     8:20 AM 5/19/2024     9:55 AM 1/29/2024    11:00 AM 10/18/2023     1:00 PM 10/13/2023     9:00  AM 9/18/2023     1:00 PM 6/7/2023     8:00 AM   Fall Risk Assessment - Outpatient   Mobility Status Ambulatory Ambulatory Ambulatory Ambulatory Ambulatory Ambulatory Ambulatory   Number of falls 0 0 0 0 0 0 0   Identified as fall risk False False False False False False False           Depression Screening  Over the past two weeks, has the patient felt down, depressed, or hopeless?: No  Over the past two weeks, has the patient felt little interest or pleasure in doing things?: No  Functional Ability/Safety Screening  Was the patient's timed Up & Go test unsteady or longer than 30 seconds?: No  Does the patient need help with phone, transportation, shopping, preparing meals, housework, laundry, meds, or managing money?: No  Does the patient's home have rugs in the hallway, lack grab bars in the bathroom, lack handrails on the stairs or have poor lighting?: No  Have you noticed any hearing difficulties?: No  Cognitive Function (Assessed through direct observation with due consideration of information obtained by way of patient reports and/or concerns raised by family, friends, caretakers, or others)    Does the patient repeat questions/statements in the same day?: No  Does the patient have trouble remembering the date, year, and time?: No  Does the patient have difficulty managing finances?: No  Does the patient have a decreased sense of direction?: No    Assessment and Plan (including Health Maintenance)      Problem List  Smart Sets  Document Outside HM   :    Plan:       ICD-10-CM ICD-9-CM   1. Encounter for annual wellness exam in Medicare patient  Z00.00 V70.0   2. Acquired hypothyroidism  E03.9 244.9   3. Seronegative rheumatoid arthritis  M06.00 714.0   4. Hypothyroidism, unspecified type  E03.9 244.9   5. Vitamin D deficiency  E55.9 268.9   6. Gastroesophageal reflux disease, unspecified whether esophagitis present  K21.9 530.81               Health Maintenance Due   Topic Date Due    TETANUS VACCINE  Never  done    Shingles Vaccine (1 of 2) Never done    RSV Vaccine (Age 60+ and Pregnant patients) (1 - 1-dose 60+ series) Never done    COVID-19 Vaccine (3 - 2023-24 season) 09/01/2023    Mammogram  07/24/2024       Problem List Items Addressed This Visit          Immunology/Multi System    Seronegative rheumatoid arthritis (Chronic)    Current Assessment & Plan     Autoimmune evaluation ordered today            Endocrine    Hypothyroid (Chronic)    Current Assessment & Plan     Thyroid level stable continue medication         Vitamin D deficiency (Chronic)    Current Assessment & Plan     Stable continue supplementation            GI    GERD (gastroesophageal reflux disease) (Chronic)       Other    Encounter for annual wellness exam in Medicare patient - Primary    Current Assessment & Plan     Discussed results of laboratory examination            Health Maintenance Topics with due status: Not Due       Topic Last Completion Date    Influenza Vaccine 09/30/2009    Colorectal Cancer Screening 03/16/2018    DEXA Scan 07/26/2021    Hemoglobin A1c (Diabetic Prevention Screening) 06/02/2023    Lipid Panel 06/19/2024       Future Appointments   Date Time Provider Department Center   1/2/2025  9:20 AM Angel Sher MD [facility] [id number] [place] Annual Wellness and Personalized Prevention Plan:   Fall Risk + Home Safety + Hearing Impairment + Depression Screen + Cognitive Impairment Screen + Health Risk Assessment all reviewed.         Advance Care Planning     Date: 06/25/2024  Patient did not wish or was not able to name a surrogate decision maker or provide an Advance Care Plan.           Opioid Screening: Patient medication list reviewed, patient is not taking prescription opioids. Patient is not using additional opioids than prescribed. Patient is at low risk of substance abuse based on this opioid use history.        Signature:  Angel Sher MD  [facility]  [place] [phone] [facility]  [place] [phone]    Date of encounter: 6/25/24    Follow up in about 6 months (around 12/25/2024) for Follow Up. In addition to their scheduled follow up, the patient has also been instructed to follow up on as needed basis.

## 2024-06-27 ENCOUNTER — TELEPHONE (OUTPATIENT)
Dept: INTERNAL MEDICINE | Facility: CLINIC | Age: 69
End: 2024-06-27
Payer: MEDICARE

## 2024-06-27 LAB
ANTINUCLEAR ANTIBODY SCREEN (OHS): NEGATIVE
CENTROMERE QUANT (OHS): <0.4 U/ML
DSDNA AB QUANT (OHS): 6.7 IU/ML
JO-1 AB QUANT (OHS): 1.3 U/ML
RNP70 AB QUANT (OHS): 0.7 U/ML
SCL-70S AB QUANT (OHS): 1.1 U/ML
SMITH AB QUANT (OHS): <0.7 U/ML
SSA(RO) AB QUANT (OHS): 0.6 U/ML
SSB(LA) AB QUANT (OHS): 2.5 U/ML
U1RNP AB QUANT (OHS): 0.7 U/ML

## 2024-06-27 NOTE — TELEPHONE ENCOUNTER
All autoimmune and inflammatory markers are normal  No sign of any autoimmune disease on this laboratory work

## 2024-09-23 PROBLEM — Z00.00 ENCOUNTER FOR ANNUAL WELLNESS EXAM IN MEDICARE PATIENT: Status: RESOLVED | Noted: 2024-06-24 | Resolved: 2024-09-23

## 2024-11-13 ENCOUNTER — OFFICE VISIT (OUTPATIENT)
Dept: INTERNAL MEDICINE | Facility: CLINIC | Age: 69
End: 2024-11-13
Payer: MEDICARE

## 2024-11-13 VITALS
BODY MASS INDEX: 26.02 KG/M2 | DIASTOLIC BLOOD PRESSURE: 70 MMHG | HEART RATE: 56 BPM | OXYGEN SATURATION: 98 % | SYSTOLIC BLOOD PRESSURE: 120 MMHG | RESPIRATION RATE: 16 BRPM | HEIGHT: 64 IN | WEIGHT: 152.38 LBS

## 2024-11-13 DIAGNOSIS — E55.9 VITAMIN D DEFICIENCY: Chronic | ICD-10-CM

## 2024-11-13 DIAGNOSIS — E03.9 HYPOTHYROIDISM, ACQUIRED: ICD-10-CM

## 2024-11-13 DIAGNOSIS — M79.641 HAND PAIN, RIGHT: ICD-10-CM

## 2024-11-13 DIAGNOSIS — M79.641 PAIN OF RIGHT HAND: ICD-10-CM

## 2024-11-13 DIAGNOSIS — M06.00 SERONEGATIVE RHEUMATOID ARTHRITIS: Primary | Chronic | ICD-10-CM

## 2024-11-13 DIAGNOSIS — K21.9 GASTROESOPHAGEAL REFLUX DISEASE, UNSPECIFIED WHETHER ESOPHAGITIS PRESENT: Chronic | ICD-10-CM

## 2024-11-13 DIAGNOSIS — M25.531 RIGHT WRIST PAIN: ICD-10-CM

## 2024-11-13 DIAGNOSIS — E03.9 HYPOTHYROIDISM, UNSPECIFIED TYPE: Chronic | ICD-10-CM

## 2024-11-13 DIAGNOSIS — T46.6X5A STATIN-INDUCED MYOSITIS: Chronic | ICD-10-CM

## 2024-11-13 DIAGNOSIS — M60.9 STATIN-INDUCED MYOSITIS: Chronic | ICD-10-CM

## 2024-11-13 PROCEDURE — 99214 OFFICE O/P EST MOD 30 MIN: CPT | Mod: ,,, | Performed by: INTERNAL MEDICINE

## 2024-11-13 RX ORDER — OMEPRAZOLE 40 MG/1
40 CAPSULE, DELAYED RELEASE ORAL EVERY MORNING
Qty: 90 CAPSULE | Refills: 3 | Status: SHIPPED | OUTPATIENT
Start: 2024-11-13 | End: 2025-11-13

## 2024-11-13 RX ORDER — CYCLOBENZAPRINE HCL 10 MG
10 TABLET ORAL NIGHTLY
COMMUNITY
Start: 2024-09-25

## 2024-11-13 RX ORDER — METHYLPREDNISOLONE 4 MG/1
TABLET ORAL
Qty: 1 EACH | Refills: 1 | Status: SHIPPED | OUTPATIENT
Start: 2024-11-13

## 2024-11-13 RX ORDER — NAPROXEN SODIUM 220 MG/1
1 TABLET, FILM COATED ORAL EVERY MORNING
COMMUNITY

## 2024-11-13 RX ORDER — ACETAMINOPHEN 500 MG
1 TABLET ORAL EVERY MORNING
COMMUNITY

## 2024-11-13 NOTE — ASSESSMENT & PLAN NOTE
Do the severity of her pain in the disability with a negative workup and x-ray MRI of the right hand and wrist ordered.

## 2024-11-13 NOTE — PROGRESS NOTES
Angel Matos MD        PATIENT NAME: Beronica Olguin  : 1955  DATE: 24  MRN: [id number]      Billing Provider: Angel Matos MD  Level of Service: CO OFFICE/OUTPT VISIT, EST, LEVL IV, 30-39 MIN  Patient PCP Information       Provider PCP Type    Angel Matos MD General            Reason for Visit / Chief Complaint: chronic inflammation       Update PCP  Update Chief Complaint         History of Present Illness / Problem Focused Workflow     Beronica Olguin presents to the clinic with chronic inflammation     Patient is here for multiple issues   She is concerned she has got so much inflammation ongoing in her body   She has a mixed diagnosis of possible seronegative rheumatoid arthritis she has got pain throughout her body worst in her right hand and right arm she was trouble using her hand and arm she saw Dr. Federico salinas for her hand he ruled out any pathology x-rays have been done.        Review of Systems   Review of Systems   Constitutional: Negative.    HENT: Negative.     Eyes: Negative.    Respiratory: Negative.     Cardiovascular: Negative.    Gastrointestinal: Negative.    Endocrine: Negative.    Genitourinary: Negative.    Musculoskeletal: Negative.    Integumentary:  Negative.   Neurological: Negative.    Psychiatric/Behavioral: Negative.          Medical / Social / Family History     Past Medical History:   Diagnosis Date    Depression     GERD (gastroesophageal reflux disease)     Hyperlipidemia     Thyroid nodule     Vitamin D deficiency        Past Surgical History:   Procedure Laterality Date    APPENDECTOMY  2019    AUGMENTATION OF BREAST      2014    BREAST SURGERY  2002     SECTION  &83    HYSTERECTOMY      ALLI/BSO for PMB.     CO REMOVAL OF OVARY/TUBE(S)      TONSILLECTOMY         Social History  Ms. Olguin  reports that she has never smoked. She has never been exposed to tobacco smoke. She has never used smokeless tobacco. She reports  current alcohol use of about 1.0 standard drink of alcohol per week. She reports that she does not use drugs.    Family History  Ms.'s Vige  family history includes COPD in her mother; Cancer in her mother; Depression in her mother; Diabetes in her maternal grandfather, maternal grandmother, mother, paternal grandmother, and sister; Heart disease in her maternal grandfather; Hypertension in her mother; Lung cancer in her mother; No Known Problems in her brother and father.    Medications and Allergies     Medications  Outpatient Medications Marked as Taking for the 11/13/24 encounter (Office Visit) with Angel Sher MD   Medication Sig Dispense Refill    aspirin 81 MG Chew Take 1 tablet by mouth every morning.      cholecalciferol, vitamin D3, 125 mcg (5,000 unit) Tab Take 1 tablet by mouth every morning.      cyclobenzaprine (FLEXERIL) 10 MG tablet Take 10 mg by mouth every evening.      FLUoxetine 20 MG capsule Take 20 mg by mouth once daily.      levothyroxine (SYNTHROID) 50 MCG tablet TAKE ONE TABLET ONCE DAILY 90 tablet 3    omeprazole (PRILOSEC) 40 MG capsule Take 1 capsule (40 mg total) by mouth every morning. 90 capsule 3    PREMARIN 0.45 mg tablet once daily.      semaglutide (OZEMPIC) 0.25 mg or 0.5 mg (2 mg/3 mL) pen injector Inject 0.25 mg into the skin every 7 days. 4 each 3       Allergies  Review of patient's allergies indicates:   Allergen Reactions    Codeine Nausea And Vomiting     hyperemesis    Demerol [meperidine] Nausea And Vomiting     HYPEREMESIS    Plaquenil [hydroxychloroquine]      Blurry vision not checked by ophtalmology    Tramadol Nausea Only and Nausea And Vomiting       Physical Examination     Vitals:    11/13/24 1417   BP: 120/70   Pulse: (!) 56   Resp: 16     Physical Exam  Constitutional:       Appearance: Normal appearance.   HENT:      Head: Normocephalic and atraumatic.      Right Ear: Tympanic membrane normal.      Left Ear: Tympanic membrane normal.      Nose: Nose  normal.      Mouth/Throat:      Mouth: Mucous membranes are moist.   Eyes:      Extraocular Movements: Extraocular movements intact.      Pupils: Pupils are equal, round, and reactive to light.   Cardiovascular:      Rate and Rhythm: Normal rate and regular rhythm.      Pulses: Normal pulses.   Pulmonary:      Effort: Pulmonary effort is normal.      Breath sounds: Normal breath sounds.   Abdominal:      General: Abdomen is flat. Bowel sounds are normal.      Palpations: Abdomen is soft.   Musculoskeletal:         General: Normal range of motion.      Cervical back: Normal range of motion and neck supple.   Skin:     General: Skin is warm and dry.   Neurological:      General: No focal deficit present.      Mental Status: She is alert and oriented to person, place, and time.   Psychiatric:         Mood and Affect: Mood normal.         Behavior: Behavior normal.          Assessment and Plan (including Health Maintenance)      Problem List  Smart Sets  Document Outside HM   :    Plan:    ICD-10-CM ICD-9-CM   1. Seronegative rheumatoid arthritis  M06.00 714.0   2. Hypothyroidism, acquired  E03.9 244.9   3. Statin-induced myositis  M60.9 729.1    T46.6X5A E942.2   4. Hand pain, right  M79.641 729.5   5. Pain of right hand  M79.641 729.5   6. Right wrist pain  M25.531 719.43   7. Hypothyroidism, unspecified type  E03.9 244.9   8. Vitamin D deficiency  E55.9 268.9   9. Gastroesophageal reflux disease, unspecified whether esophagitis present  K21.9 530.81       Problem List Items Addressed This Visit          Immunology/Multi System    Seronegative rheumatoid arthritis - Primary (Chronic)     Rheumatoid reassessment as well as other inflammatory in immune workup ordered.    We will see what her response is to a Medrol Dosepak evidence of the laboratories done.            Endocrine    Hypothyroidism, acquired     TSH level ordered         Vitamin D deficiency (Chronic)       GI    GERD (gastroesophageal reflux disease)  (Chronic)     Prilosec refilled            Orthopedic    Hand pain, right     Do the severity of her pain in the disability with a negative workup and x-ray MRI of the right hand and wrist ordered.            Other    Statin-induced myositis (Chronic)     Muscle enzymes ordered.          Other Visit Diagnoses       Pain of right hand        Right wrist pain        Hypothyroidism, unspecified type  (Chronic)                      Health Maintenance Due   Topic Date Due    TETANUS VACCINE  Never done    Shingles Vaccine (1 of 2) Never done    RSV Vaccine (Age 60+ and Pregnant patients) (1 - Risk 60-74 years 1-dose series) Never done    Influenza Vaccine (1) 09/01/2024    COVID-19 Vaccine (3 - 2024-25 season) 09/01/2024       Problem List Items Addressed This Visit          Immunology/Multi System    Seronegative rheumatoid arthritis - Primary (Chronic)    Current Assessment & Plan     Rheumatoid reassessment as well as other inflammatory in immune workup ordered.    We will see what her response is to a Medrol Dosepak evidence of the laboratories done.            Endocrine    Hypothyroidism, acquired    Current Assessment & Plan     TSH level ordered         Vitamin D deficiency (Chronic)       GI    GERD (gastroesophageal reflux disease) (Chronic)    Current Assessment & Plan     Prilosec refilled            Orthopedic    Hand pain, right    Current Assessment & Plan     Do the severity of her pain in the disability with a negative workup and x-ray MRI of the right hand and wrist ordered.            Other    Statin-induced myositis (Chronic)    Current Assessment & Plan     Muscle enzymes ordered.          Other Visit Diagnoses       Pain of right hand        Right wrist pain        Hypothyroidism, unspecified type  (Chronic)               Health Maintenance Topics with due status: Not Due       Topic Last Completion Date    Colorectal Cancer Screening 03/16/2018    DEXA Scan 07/26/2021    Hemoglobin A1c (Diabetic  Prevention Screening) 06/02/2023    Lipid Panel 06/19/2024    Mammogram 09/04/2024       Future Appointments   Date Time Provider Department Center   1/2/2025  9:20 AM Angel Sher MD [place] [id number]        I spent a total of 38 minutes on the day of the visit.This includes face to face time and non-face to face time preparing to see the patient (eg, review of tests), obtaining and/or reviewing separately obtained history, documenting clinical information in the electronic or other health record, independently interpreting results and communicating results to the patient/family/caregiver, or care coordinator.      Signature:  Angel Sher MD  [facility]  [facility] [address]    Date of encounter: 11/13/24

## 2024-11-13 NOTE — ASSESSMENT & PLAN NOTE
Rheumatoid reassessment as well as other inflammatory in immune workup ordered.    We will see what her response is to a Medrol Dosepak evidence of the laboratories done.

## 2024-11-15 ENCOUNTER — LAB VISIT (OUTPATIENT)
Dept: LAB | Facility: HOSPITAL | Age: 69
End: 2024-11-15
Attending: INTERNAL MEDICINE
Payer: MEDICARE

## 2024-11-15 DIAGNOSIS — E03.9 HYPOTHYROIDISM, ACQUIRED: ICD-10-CM

## 2024-11-15 DIAGNOSIS — M06.00 SERONEGATIVE RHEUMATOID ARTHRITIS: ICD-10-CM

## 2024-11-15 DIAGNOSIS — M60.9 STATIN-INDUCED MYOSITIS: ICD-10-CM

## 2024-11-15 DIAGNOSIS — T46.6X5A STATIN-INDUCED MYOSITIS: ICD-10-CM

## 2024-11-15 LAB
ALBUMIN SERPL-MCNC: 3.9 G/DL (ref 3.4–4.8)
ALBUMIN/GLOB SERPL: 1.6 RATIO (ref 1.1–2)
ALP SERPL-CCNC: 52 UNIT/L (ref 40–150)
ALT SERPL-CCNC: 11 UNIT/L (ref 0–55)
ANION GAP SERPL CALC-SCNC: 8 MEQ/L
AST SERPL-CCNC: 18 UNIT/L (ref 5–34)
BASOPHILS # BLD AUTO: 0.04 X10(3)/MCL
BASOPHILS NFR BLD AUTO: 1.1 %
BILIRUB SERPL-MCNC: 0.4 MG/DL
BUN SERPL-MCNC: 13.7 MG/DL (ref 9.8–20.1)
CALCIUM SERPL-MCNC: 9.3 MG/DL (ref 8.4–10.2)
CHLORIDE SERPL-SCNC: 106 MMOL/L (ref 98–107)
CK SERPL-CCNC: 86 U/L (ref 29–168)
CO2 SERPL-SCNC: 27 MMOL/L (ref 23–31)
CREAT SERPL-MCNC: 0.83 MG/DL (ref 0.55–1.02)
CREAT/UREA NIT SERPL: 17
CRP SERPL-MCNC: 8.8 MG/L
EOSINOPHIL # BLD AUTO: 0.15 X10(3)/MCL (ref 0–0.9)
EOSINOPHIL NFR BLD AUTO: 4.1 %
ERYTHROCYTE [DISTWIDTH] IN BLOOD BY AUTOMATED COUNT: 13.3 % (ref 11.5–17)
ERYTHROCYTE [SEDIMENTATION RATE] IN BLOOD: 4 MM/HR (ref 0–20)
GFR SERPLBLD CREATININE-BSD FMLA CKD-EPI: >60 ML/MIN/1.73/M2
GLOBULIN SER-MCNC: 2.5 GM/DL (ref 2.4–3.5)
GLUCOSE SERPL-MCNC: 105 MG/DL (ref 82–115)
HCT VFR BLD AUTO: 42.5 % (ref 37–47)
HGB BLD-MCNC: 14.2 G/DL (ref 12–16)
IGA SERPL-MCNC: 95 MG/DL (ref 69–517)
IGG SERPL-MCNC: 663 MG/DL (ref 522–1631)
IGM SERPL-MCNC: 39 MG/DL (ref 33–293)
IMM GRANULOCYTES # BLD AUTO: 0.01 X10(3)/MCL (ref 0–0.04)
IMM GRANULOCYTES NFR BLD AUTO: 0.3 %
LYMPHOCYTES # BLD AUTO: 1.22 X10(3)/MCL (ref 0.6–4.6)
LYMPHOCYTES NFR BLD AUTO: 33.1 %
MCH RBC QN AUTO: 29.4 PG (ref 27–31)
MCHC RBC AUTO-ENTMCNC: 33.4 G/DL (ref 33–36)
MCV RBC AUTO: 88 FL (ref 80–94)
MONOCYTES # BLD AUTO: 0.44 X10(3)/MCL (ref 0.1–1.3)
MONOCYTES NFR BLD AUTO: 11.9 %
NEUTROPHILS # BLD AUTO: 1.83 X10(3)/MCL (ref 2.1–9.2)
NEUTROPHILS NFR BLD AUTO: 49.5 %
NRBC BLD AUTO-RTO: 0 %
PLATELET # BLD AUTO: 217 X10(3)/MCL (ref 130–400)
PMV BLD AUTO: 9.7 FL (ref 7.4–10.4)
POTASSIUM SERPL-SCNC: 4.8 MMOL/L (ref 3.5–5.1)
PROT SERPL-MCNC: 6.4 GM/DL (ref 5.8–7.6)
RBC # BLD AUTO: 4.83 X10(6)/MCL (ref 4.2–5.4)
SODIUM SERPL-SCNC: 141 MMOL/L (ref 136–145)
TSH SERPL-ACNC: 2.63 UIU/ML (ref 0.35–4.94)
WBC # BLD AUTO: 3.69 X10(3)/MCL (ref 4.5–11.5)

## 2024-11-15 PROCEDURE — 85025 COMPLETE CBC W/AUTO DIFF WBC: CPT

## 2024-11-15 PROCEDURE — 86140 C-REACTIVE PROTEIN: CPT

## 2024-11-15 PROCEDURE — 82085 ASSAY OF ALDOLASE: CPT

## 2024-11-15 PROCEDURE — 36415 COLL VENOUS BLD VENIPUNCTURE: CPT

## 2024-11-15 PROCEDURE — 80053 COMPREHEN METABOLIC PANEL: CPT

## 2024-11-15 PROCEDURE — 82784 ASSAY IGA/IGD/IGG/IGM EACH: CPT

## 2024-11-15 PROCEDURE — 82550 ASSAY OF CK (CPK): CPT

## 2024-11-15 PROCEDURE — 85652 RBC SED RATE AUTOMATED: CPT

## 2024-11-15 PROCEDURE — 83521 IG LIGHT CHAINS FREE EACH: CPT

## 2024-11-15 PROCEDURE — 86200 CCP ANTIBODY: CPT

## 2024-11-15 PROCEDURE — 84443 ASSAY THYROID STIM HORMONE: CPT

## 2024-11-15 PROCEDURE — 86334 IMMUNOFIX E-PHORESIS SERUM: CPT

## 2024-11-16 LAB — ALDOLASE SERPL-CCNC: 3.5 U/L

## 2024-11-18 LAB
ALBUMIN % SPEP (OHS): 55.22 (ref 48.1–59.5)
ALBUMIN SERPL-MCNC: 3.4 G/DL (ref 3.4–4.8)
ALBUMIN/GLOB SERPL: 1.3 RATIO (ref 1.1–2)
ALPHA 1 GLOB (OHS): 0.26 GM/DL (ref 0–0.4)
ALPHA 1 GLOB% (OHS): 4.25 (ref 2.3–4.9)
ALPHA 2 GLOB % (OHS): 11.7 (ref 6.9–13)
ALPHA 2 GLOB (OHS): 0.71 GM/DL (ref 0.4–1)
BETA GLOB (OHS): 1 GM/DL (ref 0.7–1.3)
BETA GLOB% (OHS): 16.41 (ref 13.8–19.7)
GAMMA GLOBULIN % (OHS): 12.41 (ref 10.1–21.9)
GAMMA GLOBULIN (OHS): 0.76 GM/DL (ref 0.4–1.8)
GLOBULIN SER-MCNC: 2.7 GM/DL (ref 2.4–3.5)
M SPIKE % (OHS): NORMAL
M SPIKE (OHS): NORMAL
PATH REV: NORMAL
PROT SERPL-MCNC: 6.1 GM/DL (ref 5.8–7.6)

## 2024-11-19 LAB
KAPPA LC FREE SER NEPH-MCNC: 1.14 MG/DL (ref 0.33–1.94)
KAPPA LC FREE/LAMBDA FREE SER NEPH: 0.87 {RATIO} (ref 0.26–1.65)
LAMBDA LC FREE SERPL-MCNC: 1.31 MG/DL (ref 0.57–2.63)

## 2024-11-20 ENCOUNTER — TELEPHONE (OUTPATIENT)
Dept: INTERNAL MEDICINE | Facility: CLINIC | Age: 69
End: 2024-11-20
Payer: MEDICARE

## 2024-11-20 LAB
CYCLIC CITRULLINATED PEPTIDE (CCP) (OHS): 0.8 U/ML
RHEUMATOID FACTOR IGA QUANTITATIVE (OLG): 3.7 IU/ML
RHEUMATOID FACTOR IGM QUANTITATIVE (OLG): 3.4 IU/ML

## 2024-11-20 NOTE — TELEPHONE ENCOUNTER
Laboratory reports   All testing for abnormal proteins rheumatoid factor inflammation are normal there is a slight elevation of the C-reactive protein   No further evaluation at this time needed

## 2024-11-25 ENCOUNTER — TELEPHONE (OUTPATIENT)
Dept: INTERNAL MEDICINE | Facility: CLINIC | Age: 69
End: 2024-11-25
Payer: MEDICARE

## 2024-11-25 NOTE — TELEPHONE ENCOUNTER
Spoke with pt regarding results, informed her of recommendations per Dr. Sher. Pt informed me she is seeing a rheumatologist and Dr. Federico Victor

## 2024-11-25 NOTE — TELEPHONE ENCOUNTER
Significant arthitis with effusion of hand and wrist  Recommend to refer to hand specialist Dr Federico Gan.  Inquire if she sees a rheumatologist.  IF so please set up a revisit.  If not send a referral

## 2024-12-26 ENCOUNTER — TELEPHONE (OUTPATIENT)
Dept: INTERNAL MEDICINE | Facility: CLINIC | Age: 69
End: 2024-12-26
Payer: MEDICARE

## 2024-12-26 NOTE — TELEPHONE ENCOUNTER
Patient has an appointment on 1/2/25  Fasting labs NOT DONE  Please call and remind patient of appointment

## 2024-12-30 ENCOUNTER — LAB VISIT (OUTPATIENT)
Dept: LAB | Facility: HOSPITAL | Age: 69
End: 2024-12-30
Attending: INTERNAL MEDICINE
Payer: MEDICARE

## 2024-12-30 DIAGNOSIS — E03.9 HYPOTHYROIDISM, UNSPECIFIED TYPE: ICD-10-CM

## 2024-12-30 DIAGNOSIS — E03.9 ACQUIRED HYPOTHYROIDISM: Chronic | ICD-10-CM

## 2024-12-30 LAB
ALBUMIN SERPL-MCNC: 3.7 G/DL (ref 3.4–4.8)
ALBUMIN/GLOB SERPL: 1.5 RATIO (ref 1.1–2)
ALP SERPL-CCNC: 61 UNIT/L (ref 40–150)
ALT SERPL-CCNC: 11 UNIT/L (ref 0–55)
ANION GAP SERPL CALC-SCNC: 8 MEQ/L
AST SERPL-CCNC: 19 UNIT/L (ref 5–34)
BASOPHILS # BLD AUTO: 0.06 X10(3)/MCL
BASOPHILS NFR BLD AUTO: 1.2 %
BILIRUB SERPL-MCNC: 0.5 MG/DL
BUN SERPL-MCNC: 15.1 MG/DL (ref 9.8–20.1)
CALCIUM SERPL-MCNC: 8.8 MG/DL (ref 8.4–10.2)
CHLORIDE SERPL-SCNC: 106 MMOL/L (ref 98–107)
CHOLEST SERPL-MCNC: 335 MG/DL
CHOLEST/HDLC SERPL: 4 {RATIO} (ref 0–5)
CO2 SERPL-SCNC: 28 MMOL/L (ref 23–31)
CREAT SERPL-MCNC: 0.8 MG/DL (ref 0.55–1.02)
CREAT/UREA NIT SERPL: 19
EOSINOPHIL # BLD AUTO: 0.22 X10(3)/MCL (ref 0–0.9)
EOSINOPHIL NFR BLD AUTO: 4.3 %
ERYTHROCYTE [DISTWIDTH] IN BLOOD BY AUTOMATED COUNT: 13 % (ref 11.5–17)
GFR SERPLBLD CREATININE-BSD FMLA CKD-EPI: >60 ML/MIN/1.73/M2
GLOBULIN SER-MCNC: 2.4 GM/DL (ref 2.4–3.5)
GLUCOSE SERPL-MCNC: 99 MG/DL (ref 82–115)
HCT VFR BLD AUTO: 43.3 % (ref 37–47)
HDLC SERPL-MCNC: 95 MG/DL (ref 35–60)
HGB BLD-MCNC: 14 G/DL (ref 12–16)
IMM GRANULOCYTES # BLD AUTO: 0.03 X10(3)/MCL (ref 0–0.04)
IMM GRANULOCYTES NFR BLD AUTO: 0.6 %
LDLC SERPL CALC-MCNC: 222 MG/DL (ref 50–140)
LYMPHOCYTES # BLD AUTO: 1.75 X10(3)/MCL (ref 0.6–4.6)
LYMPHOCYTES NFR BLD AUTO: 34.5 %
MCH RBC QN AUTO: 28.7 PG (ref 27–31)
MCHC RBC AUTO-ENTMCNC: 32.3 G/DL (ref 33–36)
MCV RBC AUTO: 88.9 FL (ref 80–94)
MONOCYTES # BLD AUTO: 0.45 X10(3)/MCL (ref 0.1–1.3)
MONOCYTES NFR BLD AUTO: 8.9 %
NEUTROPHILS # BLD AUTO: 2.56 X10(3)/MCL (ref 2.1–9.2)
NEUTROPHILS NFR BLD AUTO: 50.5 %
NRBC BLD AUTO-RTO: 0 %
PLATELET # BLD AUTO: 207 X10(3)/MCL (ref 130–400)
PMV BLD AUTO: 9 FL (ref 7.4–10.4)
POTASSIUM SERPL-SCNC: 4.3 MMOL/L (ref 3.5–5.1)
PROT SERPL-MCNC: 6.1 GM/DL (ref 5.8–7.6)
RBC # BLD AUTO: 4.87 X10(6)/MCL (ref 4.2–5.4)
SODIUM SERPL-SCNC: 142 MMOL/L (ref 136–145)
TRIGL SERPL-MCNC: 88 MG/DL (ref 37–140)
TSH SERPL-ACNC: 2.83 UIU/ML (ref 0.35–4.94)
VLDLC SERPL CALC-MCNC: 18 MG/DL
WBC # BLD AUTO: 5.07 X10(3)/MCL (ref 4.5–11.5)

## 2024-12-30 PROCEDURE — 80061 LIPID PANEL: CPT

## 2024-12-30 PROCEDURE — 85025 COMPLETE CBC W/AUTO DIFF WBC: CPT

## 2024-12-30 PROCEDURE — 80053 COMPREHEN METABOLIC PANEL: CPT

## 2024-12-30 PROCEDURE — 84443 ASSAY THYROID STIM HORMONE: CPT

## 2024-12-30 PROCEDURE — 36415 COLL VENOUS BLD VENIPUNCTURE: CPT

## 2025-01-02 ENCOUNTER — OFFICE VISIT (OUTPATIENT)
Dept: INTERNAL MEDICINE | Facility: CLINIC | Age: 70
End: 2025-01-02
Payer: MEDICARE

## 2025-01-02 VITALS
DIASTOLIC BLOOD PRESSURE: 78 MMHG | RESPIRATION RATE: 16 BRPM | HEIGHT: 64 IN | HEART RATE: 72 BPM | WEIGHT: 155 LBS | BODY MASS INDEX: 26.46 KG/M2 | SYSTOLIC BLOOD PRESSURE: 114 MMHG | OXYGEN SATURATION: 98 %

## 2025-01-02 DIAGNOSIS — E66.9 OBESITY (BMI 35.0-39.9 WITHOUT COMORBIDITY): ICD-10-CM

## 2025-01-02 DIAGNOSIS — E78.2 MIXED HYPERLIPIDEMIA: Chronic | ICD-10-CM

## 2025-01-02 DIAGNOSIS — E03.9 HYPOTHYROIDISM, ACQUIRED: Primary | ICD-10-CM

## 2025-01-02 PROCEDURE — 99214 OFFICE O/P EST MOD 30 MIN: CPT | Mod: ,,, | Performed by: INTERNAL MEDICINE

## 2025-01-02 RX ORDER — SEMAGLUTIDE 0.25 MG/.5ML
0.25 INJECTION, SOLUTION SUBCUTANEOUS WEEKLY
Qty: 0.5 ML | Refills: 11 | Status: SHIPPED | OUTPATIENT
Start: 2025-01-02

## 2025-01-02 NOTE — PROGRESS NOTES
Angel Matos MD        PATIENT NAME: Beronica Olguin  : 1955  DATE: 25  MRN: [id number]      Billing Provider: Angel Matos MD  Level of Service: [place] OFFICE/OUTPT VISIT, EST, HIWOT IV, 30-39 MIN  Patient PCP Information       Provider PCP Type    Angel Matos MD General            Reason for Visit / Chief Complaint: Hyperlipidemia       Update PCP  Update Chief Complaint         History of Present Illness / Problem Focused Workflow     Beronica Olguin presents to the clinic with Hyperlipidemia     Beronica is here with multiple problems   She has got chronic arthritis hand pain   She has seen numerous rheumatologist's her latest says she does not have rheumatoid arthritis prior to that she was diagnosed with seronegative rheumatoid arthritis  She is seeing a hand specialist next week.  She was on Ozempic to help with the inflammation obesity she can not afford at this time we would like to try Wegovy   Same thing with the hyperlipidemia she could not afford the Repatha what we would like to try again        Review of Systems   Review of Systems   Constitutional: Negative.    HENT: Negative.     Eyes: Negative.    Respiratory: Negative.     Cardiovascular: Negative.    Gastrointestinal: Negative.    Endocrine: Negative.    Genitourinary: Negative.    Musculoskeletal: Negative.    Integumentary:  Negative.   Neurological: Negative.    Psychiatric/Behavioral: Negative.          Medical / Social / Family History     Past Medical History:   Diagnosis Date    Depression     GERD (gastroesophageal reflux disease)     Hyperlipidemia     Thyroid nodule     Vitamin D deficiency        Past Surgical History:   Procedure Laterality Date    APPENDECTOMY  2019    AUGMENTATION OF BREAST      2014    BREAST SURGERY  2002     SECTION  1982&83    HYSTERECTOMY  2014    ALLI/BSO for PMB.     [place] REMOVAL OF OVARY/TUBE(S)      TONSILLECTOMY         Social History  Ms. Olguin  reports that she has never  smoked. She has never been exposed to tobacco smoke. She has never used smokeless tobacco. She reports current alcohol use of about 1.0 standard drink of alcohol per week. She reports that she does not use drugs.    Family History  Ms.'s Sarithae  family history includes COPD in her mother; Cancer in her mother; Depression in her mother; Diabetes in her maternal grandfather, maternal grandmother, mother, paternal grandmother, and sister; Heart disease in her maternal grandfather; Hypertension in her mother; Lung cancer in her mother; No Known Problems in her brother and father.    Medications and Allergies     Medications  Outpatient Medications Marked as Taking for the 1/2/25 encounter (Office Visit) with Angel Sher MD   Medication Sig Dispense Refill    aspirin 81 MG Chew Take 1 tablet by mouth every morning.      cholecalciferol, vitamin D3, 125 mcg (5,000 unit) Tab Take 1 tablet by mouth every morning.      cyclobenzaprine (FLEXERIL) 10 MG tablet Take 10 mg by mouth every evening.      FLUoxetine 20 MG capsule Take 20 mg by mouth once daily.      levothyroxine (SYNTHROID) 50 MCG tablet TAKE ONE TABLET ONCE DAILY 90 tablet 3    omeprazole (PRILOSEC) 40 MG capsule Take 1 capsule (40 mg total) by mouth every morning. 90 capsule 3    PREMARIN 0.45 mg tablet once daily.         Allergies  Review of patient's allergies indicates:   Allergen Reactions    Codeine Nausea And Vomiting     hyperemesis    Demerol [meperidine] Nausea And Vomiting     HYPEREMESIS    Plaquenil [hydroxychloroquine]      Blurry vision not checked by ophtalmology    Tramadol Nausea Only and Nausea And Vomiting       Physical Examination     Vitals:    01/02/25 0928   BP: 114/78   Pulse: 72   Resp: 16     Physical Exam  Constitutional:       Appearance: Normal appearance.   HENT:      Head: Normocephalic and atraumatic.      Right Ear: Tympanic membrane normal.      Left Ear: Tympanic membrane normal.      Nose: Nose normal.       Mouth/Throat:      Mouth: Mucous membranes are moist.   Eyes:      Extraocular Movements: Extraocular movements intact.      Pupils: Pupils are equal, round, and reactive to light.   Cardiovascular:      Rate and Rhythm: Normal rate and regular rhythm.      Pulses: Normal pulses.   Pulmonary:      Effort: Pulmonary effort is normal.      Breath sounds: Normal breath sounds.   Abdominal:      General: Abdomen is flat. Bowel sounds are normal.      Palpations: Abdomen is soft.   Musculoskeletal:         General: Normal range of motion.      Cervical back: Normal range of motion and neck supple.      Comments: Bilateral hand pain right greater than the left in the wrist   Skin:     General: Skin is warm and dry.   Neurological:      General: No focal deficit present.      Mental Status: She is alert and oriented to person, place, and time.   Psychiatric:         Mood and Affect: Mood normal.         Behavior: Behavior normal.          Assessment and Plan (including Health Maintenance)      Problem List  Smart [place]umblebee  Document Outside HM   :    Plan:    ICD-10-CM ICD-9-CM   1. Hypothyroidism, acquired  E03.9 244.9   2. Mixed hyperlipidemia  E78.2 272.2   3. Obesity (BMI 35.0-39.9 without comorbidity)  E66.9 278.00       Problem List Items Addressed This Visit          Cardiac/Vascular    Hyperlipidemia (Chronic)     Represcribed Repatha for her to begin injections every 2 weeks            Endocrine    Obesity (BMI 35.0-39.9 without comorbidity)     Begin Wegovy treatments weekly         Hypothyroidism, acquired - Primary (Chronic)     Thyroid level stable continue treatment                   Health Maintenance Due   Topic Date Due    TETANUS VACCINE  Never done    Shingles Vaccine (1 of 2) Never done    RSV Vaccine (Age 60+ and Pregnant patients) (1 - Risk 60-74 years 1-dose series) Never done    Influenza Vaccine (1) 09/01/2024    COVID-19 Vaccine (3 - 2024-25 season) 09/01/2024       Problem List Items Addressed This  Visit          Cardiac/Vascular    Hyperlipidemia (Chronic)    Current Assessment & Plan     Represcribed Repatha for her to begin injections every 2 weeks            Endocrine    Obesity (BMI 35.0-39.9 without comorbidity)    Current Assessment & Plan     Begin Wegovy treatments weekly         Hypothyroidism, acquired - Primary (Chronic)    Current Assessment & Plan     Thyroid level stable continue treatment            Health Maintenance Topics with due status: Not Due       Topic Last Completion Date    Colorectal Cancer Screening 03/16/2018    DEXA Scan 07/26/2021    Hemoglobin A1c (Diabetic Prevention Screening) 06/02/2023    Mammogram 09/04/2024    Lipid Panel 12/30/2024       Future Appointments   Date Time Provider Department Center   7/17/2025  8:40 AM Angel Sher MD [place] [address]        I spent a total of 38 minutes on the day of the visit.This includes face to face time and non-face to face time preparing to see the patient (eg, review of tests), obtaining and/or reviewing separately obtained history, documenting clinical information in the electronic or other health record, independently interpreting results and communicating results to the patient/family/caregiver, or care coordinator.      Signature:  Angel Sher MD  [facility]  [address] [facility] [id number]    Date of encounter: 1/2/25

## 2025-01-29 NOTE — TELEPHONE ENCOUNTER
Ambulatory Care Coordination Note     1/29/2025 8:35 AM     Patient outreach attempt by this ACM today to offer care management services. ACM was unable to reach the patient by telephone today;   left voice message requesting a return phone call to this ACM.     ACM: Jesica Valdez RN     Care Summary Note:     Patient identified for ambulatory care management.  Writer phoned Patient to introduce self and explain ACM.  Writer request return call.  Contact information provided.      PCP/Specialist follow up:   Future Appointments         Provider Specialty Dept Phone    4/24/2025 8:45 AM Jack Kaur MD Cardiology [phone]            Follow Up:   Plan for next ACM outreach in approximately 1 week to complete:  - outreach attempt to offer care management services.             Spoke with patient and she was given results and recommendations.

## 2025-02-28 ENCOUNTER — TELEPHONE (OUTPATIENT)
Dept: INTERNAL MEDICINE | Facility: CLINIC | Age: 70
End: 2025-02-28
Payer: MEDICARE

## 2025-02-28 NOTE — TELEPHONE ENCOUNTER
----- Message from Lilly sent at 2/28/2025  8:44 AM CST -----  Who Called: Beronica Bianchi is requesting assistance/information from provider's office.Symptoms (please be specific): covid How long has patient had these symptoms:  List of preferred pharmacies on file (remove unneeded): [unfilled]If different, enter pharmacy into here including location and phone number: Preferred Method of Contact: Phone CallPatient's Preferred Phone Number on File: [phone] Best Call Back Number, if different:Additional Information: Pt called requesting to speak with nurse about getting a prescription of Paxlovid, pt states she was diagnose with covid   Patient/Caregiver provided printed discharge information.

## 2025-02-28 NOTE — TELEPHONE ENCOUNTER
Spoke with patient. She was advised to go to urgent care for treatment. Provider is out of office.

## 2025-03-12 ENCOUNTER — TELEPHONE (OUTPATIENT)
Dept: INTERNAL MEDICINE | Facility: CLINIC | Age: 70
End: 2025-03-12
Payer: MEDICARE

## 2025-03-18 ENCOUNTER — OFFICE VISIT (OUTPATIENT)
Dept: INTERNAL MEDICINE | Facility: CLINIC | Age: 70
End: 2025-03-18
Payer: MEDICARE

## 2025-03-18 VITALS
WEIGHT: 155 LBS | DIASTOLIC BLOOD PRESSURE: 72 MMHG | HEIGHT: 64 IN | SYSTOLIC BLOOD PRESSURE: 108 MMHG | RESPIRATION RATE: 16 BRPM | OXYGEN SATURATION: 98 % | HEART RATE: 75 BPM | BODY MASS INDEX: 26.46 KG/M2

## 2025-03-18 DIAGNOSIS — M54.40 LOW BACK PAIN WITH SCIATICA, SCIATICA LATERALITY UNSPECIFIED, UNSPECIFIED BACK PAIN LATERALITY, UNSPECIFIED CHRONICITY: Primary | Chronic | ICD-10-CM

## 2025-03-18 DIAGNOSIS — M54.9 DORSALGIA, UNSPECIFIED: ICD-10-CM

## 2025-03-18 DIAGNOSIS — M48.062 SPINAL STENOSIS OF LUMBAR REGION WITH NEUROGENIC CLAUDICATION: ICD-10-CM

## 2025-03-18 DIAGNOSIS — M25.551 HIP PAIN, BILATERAL: ICD-10-CM

## 2025-03-18 DIAGNOSIS — M25.552 HIP PAIN, BILATERAL: ICD-10-CM

## 2025-03-18 PROCEDURE — 99214 OFFICE O/P EST MOD 30 MIN: CPT | Mod: ,,, | Performed by: INTERNAL MEDICINE

## 2025-03-18 RX ORDER — ESTRADIOL 1 MG/1
1 TABLET ORAL DAILY
COMMUNITY

## 2025-03-18 RX ORDER — TRAMADOL HYDROCHLORIDE 50 MG/1
50 TABLET ORAL EVERY 12 HOURS PRN
COMMUNITY
Start: 2025-02-18

## 2025-03-18 RX ORDER — THYROID, PORCINE 120 MG/1
120 TABLET ORAL EVERY MORNING
COMMUNITY
Start: 2025-01-14

## 2025-03-18 RX ORDER — PROGESTERONE 100 MG/1
100 CAPSULE ORAL DAILY
COMMUNITY

## 2025-03-18 NOTE — PROGRESS NOTES
Angel Matos MD        PATIENT NAME: Beronica Olguin  : 1955  DATE: 3/18/25  MRN: [id number]      Billing Provider: Angel Matos MD  Level of Service: NM OFFICE/OUTPT VISIT, EST, LEVNAYANA IV, 30-39 MIN  Patient PCP Information       Provider PCP Type    Angel Matos MD General            Reason for Visit / Chief Complaint: Pain (Patient reports she has low back pain for about 8 weeks and it is getting worse. She reports she does have past chronic back issues but it is not getting better. Her back pain is now radiating into both upper thighs.)       Update PCP  Update Chief Complaint         History of Present Illness / Problem Focused Workflow     Beronica Olguin presents to the clinic with Pain (Patient reports she has low back pain for about 8 weeks and it is getting worse. She reports she does have past chronic back issues but it is not getting better. Her back pain is now radiating into both upper thighs.)     Patient is here for significant worsening of her back issues   She has significant low back pain radiating to her buttocks causing pain into her inner upper thighs   She has trouble walking she can not get out of the car because of pain it is inhibiting her lifestyle          Review of Systems   Review of Systems   Constitutional: Negative.    HENT: Negative.     Eyes: Negative.    Respiratory: Negative.     Cardiovascular: Negative.    Gastrointestinal: Negative.    Endocrine: Negative.    Genitourinary: Negative.    Musculoskeletal:  Positive for back pain.   Integumentary:  Negative.   Neurological: Negative.    Psychiatric/Behavioral: Negative.          Medical / Social / Family History     Past Medical History:   Diagnosis Date    Depression     GERD (gastroesophageal reflux disease)     Hyperlipidemia     Thyroid nodule     Vitamin D deficiency        Past Surgical History:   Procedure Laterality Date    APPENDECTOMY  2019    AUGMENTATION OF BREAST      2014    BREAST  SURGERY  2002     SECTION  1982&83    HYSTERECTOMY  2014    ALLI/BSO for PMB.     [place] REMOVAL OF OVARY/TUBE(S)      TONSILLECTOMY         Social History  Ms. Olguin  reports that she has never smoked. She has never been exposed to tobacco smoke. She has never used smokeless tobacco. She reports current alcohol use of about 1.0 standard drink of alcohol per week. She reports that she does not use drugs.    Family History  Ms.'s Olguin  family history includes COPD in her mother; Cancer in her mother; Depression in her mother; Diabetes in her maternal grandfather, maternal grandmother, mother, paternal grandmother, and sister; Heart disease in her maternal grandfather; Hypertension in her mother; Lung cancer in her mother; No Known Problems in her brother and father.    Medications and Allergies     Medications  Outpatient Medications Marked as Taking for the 3/18/25 encounter (Office Visit) with Angel Sher MD   Medication Sig Dispense Refill    ARMOUR THYROID 120 mg Tab Take 120 mg by mouth every morning.      aspirin 81 MG Chew Take 1 tablet by mouth every morning.      cholecalciferol, vitamin D3, 125 mcg (5,000 unit) Tab Take 1 tablet by mouth every morning.      cyclobenzaprine (FLEXERIL) 10 MG tablet Take 10 mg by mouth every evening.      estradioL (ESTRACE) 1 MG tablet Take 1 mg by mouth once daily.      evolocumab (REPATHA SURECLICK) 140 mg/mL PnIj Inject 1 mL (140 mg total) into the skin every 14 (fourteen) days. 4 each 11    FLUoxetine 20 MG capsule Take 20 mg by mouth once daily.      omeprazole (PRILOSEC) 40 MG capsule Take 1 capsule (40 mg total) by mouth every morning. 90 capsule 3    progesterone (PROMETRIUM) 100 MG capsule Take 100 mg by mouth once daily.      TESTOSTERONE 5 MG CAP Take 10 mg by mouth once daily.      traMADoL (ULTRAM) 50 mg tablet Take 50 mg by mouth every 12 (twelve) hours as needed for Pain.         Allergies  Review of patient's allergies indicates:   Allergen  Reactions    Codeine Nausea And Vomiting     hyperemesis    Demerol [meperidine] Nausea And Vomiting     HYPEREMESIS    Plaquenil [hydroxychloroquine]      Blurry vision not checked by ophtalmology    Tramadol Nausea Only and Nausea And Vomiting       Physical Examination     Vitals:    03/18/25 0811   BP: 108/72   Pulse: 75   Resp: 16     Physical Exam  Constitutional:       Appearance: Normal appearance.   HENT:      Head: Normocephalic and atraumatic.      Right Ear: Tympanic membrane normal.      Left Ear: Tympanic membrane normal.      Nose: Nose normal.      Mouth/Throat:      Mouth: Mucous membranes are moist.   Eyes:      Extraocular Movements: Extraocular movements intact.      Pupils: Pupils are equal, round, and reactive to light.   Cardiovascular:      Rate and Rhythm: Normal rate and regular rhythm.      Pulses: Normal pulses.   Pulmonary:      Effort: Pulmonary effort is normal.      Breath sounds: Normal breath sounds.   Abdominal:      General: Abdomen is flat. Bowel sounds are normal.      Palpations: Abdomen is soft.   Musculoskeletal:         General: Normal range of motion.      Cervical back: Normal range of motion and neck supple.      Comments: She has significant loss of mobility with flexion and extension in her low back   She got significant restriction in her hip movement due to pain in her upper thigh muscles.   Skin:     General: Skin is warm and dry.   Neurological:      General: No focal deficit present.      Mental Status: She is alert and oriented to person, place, and time.      Comments: Strength testing of both lower extremities is normal   Psychiatric:         Mood and Affect: Mood normal.         Behavior: Behavior normal.          Assessment and Plan (including Health Maintenance)      Problem List  Smart Sets  Document Outside HM   :    Plan:    ICD-10-CM ICD-9-CM   1. Low back pain with sciatica, sciatica laterality unspecified, unspecified back pain laterality, unspecified  chronicity  M54.40 724.3   2. Spinal stenosis of lumbar region with neurogenic claudication  M48.062 724.03   3. Hip pain, bilateral  M25.551 719.45    M25.552    4. Dorsalgia, unspecified  M54.9 724.5       Problem List Items Addressed This Visit          Neuro    Spinal stenosis of lumbar region with neurogenic claudication    MRI             Orthopedic    Hip pain, bilateral    X-rays ordered         Back pain - Primary (Chronic)    Significant worsening of her low back pain with radiation of pain into her buttocks and into her upper thighs   Suspect significant spinal disease   Flexion-extension x-rays ordered of the back as well as her hips and SI joint   MRI back ordered   Referral to pain management for evaluation for possible injections          Other Visit Diagnoses         Dorsalgia, unspecified                       Health Maintenance Due   Topic Date Due    TETANUS VACCINE  Never done    Shingles Vaccine (1 of 2) Never done    RSV Vaccine (Age 60+ and Pregnant patients) (1 - Risk 60-74 years 1-dose series) Never done    Influenza Vaccine (1) 09/01/2024    COVID-19 Vaccine (3 - 2024-25 season) 09/01/2024       Problem List Items Addressed This Visit          Neuro    Spinal stenosis of lumbar region with neurogenic claudication    Current Assessment & Plan   MRI             Orthopedic    Hip pain, bilateral    Current Assessment & Plan   X-rays ordered         Back pain - Primary (Chronic)    Current Assessment & Plan   Significant worsening of her low back pain with radiation of pain into her buttocks and into her upper thighs   Suspect significant spinal disease   Flexion-extension x-rays ordered of the back as well as her hips and SI joint   MRI back ordered   Referral to pain management for evaluation for possible injections          Other Visit Diagnoses         Dorsalgia, unspecified                Health Maintenance Topics with due status: Not Due       Topic Last Completion Date    Colorectal  Cancer Screening 03/16/2018    DEXA Scan 07/26/2021    Hemoglobin A1c (Diabetic Prevention Screening) 06/02/2023    Mammogram 09/04/2024    Lipid Panel 12/30/2024       Future Appointments   Date Time Provider Department Center   7/17/2025  8:40 AM Angel Sher MD [place] [id number]      I spent a total of 38 minutes on the day of the visit.This includes face to face time and non-face to face time preparing to see the patient (eg, review of tests), obtaining and/or reviewing separately obtained history, documenting clinical information in the electronic or other health record, independently interpreting results and communicating results to the patient/family/caregiver, or care coordinator.        Signature:  Angel Sher MD  [facility]  [address] [place] [phone]    Date of encounter: 3/18/25

## 2025-03-18 NOTE — ASSESSMENT & PLAN NOTE
Significant worsening of her low back pain with radiation of pain into her buttocks and into her upper thighs   Suspect significant spinal disease   Flexion-extension x-rays ordered of the back as well as her hips and SI joint   MRI back ordered   Referral to pain management for evaluation for possible injections

## 2025-03-19 ENCOUNTER — RESULTS FOLLOW-UP (OUTPATIENT)
Dept: INTERNAL MEDICINE | Facility: CLINIC | Age: 70
End: 2025-03-19
Payer: MEDICARE

## 2025-03-19 ENCOUNTER — TELEPHONE (OUTPATIENT)
Dept: INTERNAL MEDICINE | Facility: CLINIC | Age: 70
End: 2025-03-19
Payer: MEDICARE

## 2025-03-19 DIAGNOSIS — M48.062 SPINAL STENOSIS OF LUMBAR REGION WITH NEUROGENIC CLAUDICATION: Primary | ICD-10-CM

## 2025-03-19 NOTE — TELEPHONE ENCOUNTER
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

## 2025-03-31 ENCOUNTER — OFFICE VISIT (OUTPATIENT)
Dept: URGENT CARE | Facility: CLINIC | Age: 70
End: 2025-03-31
Payer: MEDICARE

## 2025-03-31 ENCOUNTER — DOCUMENTATION ONLY (OUTPATIENT)
Dept: RHEUMATOLOGY | Facility: CLINIC | Age: 70
End: 2025-03-31
Payer: MEDICARE

## 2025-03-31 VITALS
TEMPERATURE: 99 F | WEIGHT: 155 LBS | HEIGHT: 64 IN | DIASTOLIC BLOOD PRESSURE: 87 MMHG | HEART RATE: 83 BPM | RESPIRATION RATE: 16 BRPM | SYSTOLIC BLOOD PRESSURE: 144 MMHG | OXYGEN SATURATION: 98 % | BODY MASS INDEX: 26.46 KG/M2

## 2025-03-31 DIAGNOSIS — R05.9 COUGH, UNSPECIFIED TYPE: Primary | ICD-10-CM

## 2025-03-31 PROCEDURE — 99213 OFFICE O/P EST LOW 20 MIN: CPT | Mod: ,,, | Performed by: NURSE PRACTITIONER

## 2025-03-31 RX ORDER — AZITHROMYCIN 250 MG/1
TABLET, FILM COATED ORAL
Qty: 6 TABLET | Refills: 0 | Status: SHIPPED | OUTPATIENT
Start: 2025-03-31

## 2025-03-31 RX ORDER — ALBUTEROL SULFATE 90 UG/1
1-2 INHALANT RESPIRATORY (INHALATION) EVERY 6 HOURS PRN
Qty: 18 G | Refills: 0 | Status: SHIPPED | OUTPATIENT
Start: 2025-03-31 | End: 2025-04-30

## 2025-03-31 NOTE — PROGRESS NOTES
"Subjective:      Patient ID: Beronica Olguin is a 69 y.o. female.    Vitals:  height is 5' 4" (1.626 m) and weight is 70.3 kg (155 lb). Her temperature is 98.8 °F (37.1 °C). Her blood pressure is 144/87 (abnormal) and her pulse is 83. Her respiration is 16 and oxygen saturation is 98%.     Chief Complaint: Cough     Patient is a 69 y.o. female who presents to urgent care with complaints of dry cough, burning in her chest x4 days. Alleviating factors include mucinex with mild amount of relief. Patient denies  n/v. States she cannot get a full breath in, fever of 100 max    Cough  Pertinent negatives include no fever, headaches, myalgias, sore throat, shortness of breath or wheezing.     Constitution: Negative for fatigue and fever.   HENT: Negative.  Negative for sinus pain and sore throat.    Cardiovascular: Negative.    Eyes: Negative.    Respiratory:  Positive for cough. Negative for shortness of breath, wheezing and asthma.    Gastrointestinal:  Negative for nausea, vomiting and diarrhea.   Endocrine: negative.   Genitourinary:  Negative for dysuria, frequency, urgency and urine decreased.   Musculoskeletal:  Negative for muscle ache.   Allergic/Immunologic: Negative for asthma.   Neurological: Negative.  Negative for headaches.      Objective:     Physical Exam   Constitutional: She is oriented to person, place, and time. She appears well-developed. She is cooperative.  Non-toxic appearance. She does not appear ill. No distress.   HENT:   Head: Normocephalic and atraumatic.   Ears:   Right Ear: Hearing, tympanic membrane, external ear and ear canal normal.   Left Ear: Hearing, tympanic membrane, external ear and ear canal normal.   Nose: Nose normal. No mucosal edema, rhinorrhea or nasal deformity. No epistaxis. Right sinus exhibits no maxillary sinus tenderness and no frontal sinus tenderness. Left sinus exhibits no maxillary sinus tenderness and no frontal sinus tenderness.   Mouth/Throat: Uvula is midline, " oropharynx is clear and moist and mucous membranes are normal. No trismus in the jaw. Normal dentition. No uvula swelling. No oropharyngeal exudate, posterior oropharyngeal edema or posterior oropharyngeal erythema.   Eyes: Conjunctivae and lids are normal. No scleral icterus.   Neck: Trachea normal and phonation normal. Neck supple. No edema present. No erythema present. No neck rigidity present.   Cardiovascular: Normal rate, regular rhythm, normal heart sounds and normal pulses.   Pulmonary/Chest: Effort normal and breath sounds normal. No respiratory distress. She has no decreased breath sounds. She has no rhonchi.   Abdominal: Normal appearance.   Musculoskeletal: Normal range of motion.         General: No deformity. Normal range of motion.   Neurological: She is alert and oriented to person, place, and time. She exhibits normal muscle tone. Coordination normal.   Skin: Skin is warm, dry, intact, not diaphoretic and not pale.   Psychiatric: Her speech is normal and behavior is normal. Judgment and thought content normal.   Nursing note and vitals reviewed.    Assessment:     1. Cough, unspecified type        Plan:       Cough, unspecified type    Other orders  -     azithromycin (ZITHROMAX Z-MAX) 250 MG tablet; Take 2 tablets (500 mg) on  Day 1,  followed by 1 tablet (250 mg) once daily on Days 2 through 5.  Dispense: 6 tablet; Refill: 0  -     albuterol (VENTOLIN HFA) 90 mcg/actuation inhaler; Inhale 1-2 puffs into the lungs every 6 (six) hours as needed for Wheezing. Rescue  Dispense: 18 g; Refill: 0

## 2025-05-07 ENCOUNTER — LAB REQUISITION (OUTPATIENT)
Dept: LAB | Facility: HOSPITAL | Age: 70
End: 2025-05-07
Payer: MEDICARE

## 2025-05-07 DIAGNOSIS — R30.0 DYSURIA: ICD-10-CM

## 2025-05-07 PROCEDURE — 87086 URINE CULTURE/COLONY COUNT: CPT | Performed by: OBSTETRICS & GYNECOLOGY

## 2025-05-09 LAB — BACTERIA UR CULT: ABNORMAL

## 2025-06-04 ENCOUNTER — LAB REQUISITION (OUTPATIENT)
Dept: LAB | Facility: HOSPITAL | Age: 70
End: 2025-06-04
Payer: MEDICARE

## 2025-06-04 DIAGNOSIS — R30.0 DYSURIA: ICD-10-CM

## 2025-06-04 PROCEDURE — 87086 URINE CULTURE/COLONY COUNT: CPT | Performed by: OBSTETRICS & GYNECOLOGY

## 2025-06-06 LAB — BACTERIA UR CULT: NO GROWTH

## 2025-07-10 ENCOUNTER — TELEPHONE (OUTPATIENT)
Dept: INTERNAL MEDICINE | Facility: CLINIC | Age: 70
End: 2025-07-10
Payer: MEDICARE

## 2025-07-10 NOTE — TELEPHONE ENCOUNTER
----- Message from Med Assistant Garcia sent at 7/8/2025  7:35 AM CDT -----  Regarding: Pre-Visits 07/17/2025  Patient has an appointment on 7/17  Fasting labs not done  Please call and remind patient of appointment

## 2025-07-16 ENCOUNTER — TELEPHONE (OUTPATIENT)
Dept: INTERNAL MEDICINE | Facility: CLINIC | Age: 70
End: 2025-07-16
Payer: MEDICARE

## 2025-07-16 ENCOUNTER — LAB VISIT (OUTPATIENT)
Dept: LAB | Facility: HOSPITAL | Age: 70
End: 2025-07-16
Attending: INTERNAL MEDICINE
Payer: MEDICARE

## 2025-07-16 DIAGNOSIS — E03.9 HYPOTHYROIDISM, ACQUIRED: ICD-10-CM

## 2025-07-16 DIAGNOSIS — E78.2 MIXED HYPERLIPIDEMIA: ICD-10-CM

## 2025-07-16 LAB
25(OH)D3+25(OH)D2 SERPL-MCNC: 89 NG/ML (ref 30–80)
ALBUMIN SERPL-MCNC: 3.7 G/DL (ref 3.4–4.8)
ALBUMIN/GLOB SERPL: 1.5 RATIO (ref 1.1–2)
ALP SERPL-CCNC: 58 UNIT/L (ref 40–150)
ALT SERPL-CCNC: 8 UNIT/L (ref 0–55)
ANION GAP SERPL CALC-SCNC: 8 MEQ/L
AST SERPL-CCNC: 13 UNIT/L (ref 11–45)
BASOPHILS # BLD AUTO: 0.05 X10(3)/MCL
BASOPHILS NFR BLD AUTO: 1 %
BILIRUB SERPL-MCNC: 0.5 MG/DL
BUN SERPL-MCNC: 14.6 MG/DL (ref 9.8–20.1)
CALCIUM SERPL-MCNC: 9 MG/DL (ref 8.4–10.2)
CHLORIDE SERPL-SCNC: 107 MMOL/L (ref 98–107)
CHOLEST SERPL-MCNC: 186 MG/DL
CHOLEST/HDLC SERPL: 3 {RATIO} (ref 0–5)
CO2 SERPL-SCNC: 27 MMOL/L (ref 23–31)
CREAT SERPL-MCNC: 0.73 MG/DL (ref 0.55–1.02)
CREAT/UREA NIT SERPL: 20
EOSINOPHIL # BLD AUTO: 0.21 X10(3)/MCL (ref 0–0.9)
EOSINOPHIL NFR BLD AUTO: 4.1 %
ERYTHROCYTE [DISTWIDTH] IN BLOOD BY AUTOMATED COUNT: 13.9 % (ref 11.5–17)
GFR SERPLBLD CREATININE-BSD FMLA CKD-EPI: >60 ML/MIN/1.73/M2
GLOBULIN SER-MCNC: 2.4 GM/DL (ref 2.4–3.5)
GLUCOSE SERPL-MCNC: 95 MG/DL (ref 82–115)
HCT VFR BLD AUTO: 43.9 % (ref 37–47)
HDLC SERPL-MCNC: 74 MG/DL (ref 35–60)
HGB BLD-MCNC: 14.1 G/DL (ref 12–16)
IMM GRANULOCYTES # BLD AUTO: 0.01 X10(3)/MCL (ref 0–0.04)
IMM GRANULOCYTES NFR BLD AUTO: 0.2 %
LDLC SERPL CALC-MCNC: 96 MG/DL (ref 50–140)
LYMPHOCYTES # BLD AUTO: 1.65 X10(3)/MCL (ref 0.6–4.6)
LYMPHOCYTES NFR BLD AUTO: 32.4 %
MCH RBC QN AUTO: 28.5 PG (ref 27–31)
MCHC RBC AUTO-ENTMCNC: 32.1 G/DL (ref 33–36)
MCV RBC AUTO: 88.7 FL (ref 80–94)
MONOCYTES # BLD AUTO: 0.41 X10(3)/MCL (ref 0.1–1.3)
MONOCYTES NFR BLD AUTO: 8.1 %
NEUTROPHILS # BLD AUTO: 2.76 X10(3)/MCL (ref 2.1–9.2)
NEUTROPHILS NFR BLD AUTO: 54.2 %
NRBC BLD AUTO-RTO: 0 %
PLATELET # BLD AUTO: 248 X10(3)/MCL (ref 130–400)
PMV BLD AUTO: 9.5 FL (ref 7.4–10.4)
POTASSIUM SERPL-SCNC: 4.6 MMOL/L (ref 3.5–5.1)
PROT SERPL-MCNC: 6.1 GM/DL (ref 5.8–7.6)
RBC # BLD AUTO: 4.95 X10(6)/MCL (ref 4.2–5.4)
SODIUM SERPL-SCNC: 142 MMOL/L (ref 136–145)
TRIGL SERPL-MCNC: 82 MG/DL (ref 37–140)
TSH SERPL-ACNC: 0.03 UIU/ML (ref 0.35–4.94)
VLDLC SERPL CALC-MCNC: 16 MG/DL
WBC # BLD AUTO: 5.09 X10(3)/MCL (ref 4.5–11.5)

## 2025-07-16 PROCEDURE — 84443 ASSAY THYROID STIM HORMONE: CPT

## 2025-07-16 PROCEDURE — 80061 LIPID PANEL: CPT

## 2025-07-16 PROCEDURE — 36415 COLL VENOUS BLD VENIPUNCTURE: CPT

## 2025-07-16 PROCEDURE — 85025 COMPLETE CBC W/AUTO DIFF WBC: CPT

## 2025-07-16 PROCEDURE — 80053 COMPREHEN METABOLIC PANEL: CPT

## 2025-07-16 PROCEDURE — 82306 VITAMIN D 25 HYDROXY: CPT

## 2025-07-17 ENCOUNTER — LAB VISIT (OUTPATIENT)
Dept: LAB | Facility: HOSPITAL | Age: 70
End: 2025-07-17
Payer: MEDICARE

## 2025-07-17 DIAGNOSIS — E03.9 HYPOTHYROIDISM, ACQUIRED: Chronic | ICD-10-CM

## 2025-07-17 LAB
T3 SERPL-MCNC: 92.64 NG/DL (ref 60–180)
T4 FREE SERPL-MCNC: 0.95 NG/DL (ref 0.7–1.48)
TSH SERPL-ACNC: 0.02 UIU/ML (ref 0.35–4.94)

## 2025-07-17 PROCEDURE — 84439 ASSAY OF FREE THYROXINE: CPT

## 2025-07-17 PROCEDURE — 84443 ASSAY THYROID STIM HORMONE: CPT

## 2025-07-17 PROCEDURE — 84480 ASSAY TRIIODOTHYRONINE (T3): CPT

## 2025-07-17 PROCEDURE — 36415 COLL VENOUS BLD VENIPUNCTURE: CPT

## 2025-07-21 ENCOUNTER — OFFICE VISIT (OUTPATIENT)
Dept: INTERNAL MEDICINE | Facility: CLINIC | Age: 70
End: 2025-07-21
Payer: MEDICARE

## 2025-07-21 ENCOUNTER — LAB VISIT (OUTPATIENT)
Dept: LAB | Facility: HOSPITAL | Age: 70
End: 2025-07-21
Attending: INTERNAL MEDICINE
Payer: MEDICARE

## 2025-07-21 ENCOUNTER — TELEPHONE (OUTPATIENT)
Dept: INTERNAL MEDICINE | Facility: CLINIC | Age: 70
End: 2025-07-21

## 2025-07-21 VITALS
BODY MASS INDEX: 25.34 KG/M2 | OXYGEN SATURATION: 98 % | RESPIRATION RATE: 16 BRPM | HEIGHT: 63 IN | SYSTOLIC BLOOD PRESSURE: 118 MMHG | DIASTOLIC BLOOD PRESSURE: 80 MMHG | HEART RATE: 67 BPM | WEIGHT: 143 LBS

## 2025-07-21 DIAGNOSIS — E03.9 HYPOTHYROIDISM, ACQUIRED: Chronic | ICD-10-CM

## 2025-07-21 DIAGNOSIS — E55.9 VITAMIN D DEFICIENCY: Chronic | ICD-10-CM

## 2025-07-21 DIAGNOSIS — E78.2 MIXED HYPERLIPIDEMIA: Chronic | ICD-10-CM

## 2025-07-21 DIAGNOSIS — Z00.00 ENCOUNTER FOR ANNUAL WELLNESS EXAM IN MEDICARE PATIENT: Primary | ICD-10-CM

## 2025-07-21 DIAGNOSIS — R10.9 ABDOMINAL PAIN, UNSPECIFIED ABDOMINAL LOCATION: ICD-10-CM

## 2025-07-21 LAB
BACTERIA #/AREA URNS AUTO: ABNORMAL /HPF
BILIRUB UR QL STRIP.AUTO: NEGATIVE
CLARITY UR: CLEAR
COLOR UR AUTO: COLORLESS
GLUCOSE UR QL STRIP: NORMAL
HGB UR QL STRIP: NEGATIVE
KETONES UR QL STRIP: NEGATIVE
LEUKOCYTE ESTERASE UR QL STRIP: NEGATIVE
NITRITE UR QL STRIP: NEGATIVE
PH UR STRIP: 7 [PH]
PROT UR QL STRIP: NEGATIVE
RBC #/AREA URNS AUTO: ABNORMAL /HPF
SP GR UR STRIP.AUTO: 1 (ref 1–1.03)
SQUAMOUS #/AREA URNS LPF: ABNORMAL /HPF
UROBILINOGEN UR STRIP-ACNC: NORMAL
WBC #/AREA URNS AUTO: ABNORMAL /HPF

## 2025-07-21 PROCEDURE — G0439 PPPS, SUBSEQ VISIT: HCPCS | Mod: ,,, | Performed by: INTERNAL MEDICINE

## 2025-07-21 PROCEDURE — 81015 MICROSCOPIC EXAM OF URINE: CPT

## 2025-07-21 NOTE — PROGRESS NOTES
Angel Matos MD        PATIENT NAME: Beronica Olguin  : 1955  DATE: 25  MRN: [id number]      Patient Care Team:  Angel Matos MD as PCP - General (Internal Medicine)  Imaging, Lamont Cantrell MD as Consulting Physician (Gastroenterology)  Roger Baker MD as Consulting Physician (Obstetrics and Gynecology)       Billing Provider: Angel Matos MD  Level of Service: [place] MED[place] ANNUAL WELLNESS SUBSEQUENT VISIT  Patient PCP Information       Provider PCP Type    Angel Matos MD General            Reason for Visit / Chief Complaint: Medicare AWV Follow Up (Patient is here for her medicare wellness, labs completed prior to appointment.)       Update PCP  Update Chief Complaint         History of Present Illness / Problem Focused Workflow     Beronica Olguin presents to the clinic with Medicare AWV Follow Up (Patient is here for her medicare wellness, labs completed prior to appointment.)     Beronica is here for annual wellness exam   Since last visit she has begun hormone therapy and switched to [place] thyroid seeing another physician   She is also having abdominal pain and needs to see GI        Review of Systems   Review of Systems   Constitutional: Negative.    HENT: Negative.     Eyes: Negative.    Respiratory: Negative.     Cardiovascular: Negative.    Gastrointestinal: Negative.    Endocrine: Negative.    Genitourinary: Negative.    Musculoskeletal: Negative.    Integumentary:  Negative.   Neurological: Negative.    Psychiatric/Behavioral: Negative.          Patient Reported Health Risk Assessment       Medical / Social / Family History     Past Medical History:   Diagnosis Date    Depression     GERD (gastroesophageal reflux disease)     Hyperlipidemia     Thyroid nodule     Vitamin D deficiency        Past Surgical History:   Procedure Laterality Date    APPENDECTOMY  2019    AUGMENTATION OF BREAST      2014    BREAST SURGERY  2002     SECTION   1982&83    HYSTERECTOMY  2014    ALLI/BSO for PMB.     [place] REMOVAL OF OVARY/TUBE(S)      TONSILLECTOMY         Social History  Ms. Olguin  reports that she has never smoked. She has never been exposed to tobacco smoke. She has never used smokeless tobacco. She reports current alcohol use of about 1.0 standard drink of alcohol per week. She reports that she does not use drugs.    Family History  Ms.'s Olguin  family history includes COPD in her mother; Cancer in her mother; Depression in her mother; Diabetes in her maternal grandfather, maternal grandmother, mother, paternal grandmother, and sister; Heart disease in her maternal grandfather; Hypertension in her mother; Lung cancer in her mother; No Known Problems in her brother and father.        Medications and Allergies     Medications  Outpatient Medications Marked as Taking for the 7/21/25 encounter (Office Visit) with Angel Sher MD   Medication Sig Dispense Refill    albuterol (VENTOLIN HFA) 90 mcg/actuation inhaler Inhale 1-2 puffs into the lungs every 6 (six) hours as needed for Wheezing. Rescue 18 g 0    ARMOUR THYROID 120 mg Tab Take 120 mg by mouth every morning.      aspirin 81 MG Chew Take 1 tablet by mouth every morning.      azithromycin (ZITHROMAX Z-MAX) 250 MG tablet Take 2 tablets (500 mg) on  Day 1,  followed by 1 tablet (250 mg) once daily on Days 2 through 5. 6 tablet 0    cholecalciferol, vitamin D3, 125 mcg (5,000 unit) Tab Take 1 tablet by mouth every morning.      cyclobenzaprine (FLEXERIL) 10 MG tablet Take 10 mg by mouth every evening.      estradioL (ESTRACE) 1 MG tablet Take 1 mg by mouth once daily.      evolocumab (REPATHA SURECLICK) 140 mg/mL PnIj Inject 1 mL (140 mg total) into the skin every 14 (fourteen) days. 4 each 11    FLUoxetine 20 MG capsule Take 20 mg by mouth once daily.      omeprazole (PRILOSEC) 40 MG capsule Take 1 capsule (40 mg total) by mouth every morning. 90 capsule 3    progesterone (PROMETRIUM) 100 MG capsule  Take 100 mg by mouth once daily.      TESTOSTERONE 5 MG CAP Take 10 mg by mouth once daily.      traMADoL (ULTRAM) 50 mg tablet Take 50 mg by mouth every 12 (twelve) hours as needed for Pain.         Allergies  Review of patient's allergies indicates:   Allergen Reactions    Codeine Nausea And Vomiting     hyperemesis    Demerol [meperidine] Nausea And Vomiting     HYPEREMESIS    Plaquenil [hydroxychloroquine]      Blurry vision not checked by ophtalmology    Tramadol Nausea Only and Nausea And Vomiting       Physical Examination     Vitals:    07/21/25 0826   BP: 118/80   Pulse: 67   Resp: 16     Physical Exam  Constitutional:       Appearance: Normal appearance.   HENT:      Head: Normocephalic and atraumatic.      Right Ear: Tympanic membrane normal.      Left Ear: Tympanic membrane normal.      Nose: Nose normal.      Mouth/Throat:      Mouth: Mucous membranes are moist.   Eyes:      Extraocular Movements: Extraocular movements intact.      Pupils: Pupils are equal, round, and reactive to light.   Cardiovascular:      Rate and Rhythm: Normal rate and regular rhythm.      Pulses: Normal pulses.   Pulmonary:      Effort: Pulmonary effort is normal.      Breath sounds: Normal breath sounds.   Abdominal:      General: Abdomen is flat. Bowel sounds are normal.      Palpations: Abdomen is soft.   Musculoskeletal:         General: Normal range of motion.      Cervical back: Normal range of motion and neck supple.   Skin:     General: Skin is warm and dry.   Neurological:      General: No focal deficit present.      Mental Status: She is alert and oriented to person, place, and time.   Psychiatric:         Mood and Affect: Mood normal.         Behavior: Behavior normal.               No data to display                  7/21/2025     8:20 AM 3/18/2025     8:00 AM 1/2/2025     9:20 AM 11/13/2024     2:00 PM 6/25/2024     8:20 AM 5/19/2024     9:55 AM 1/29/2024    11:00 AM   Fall Risk Assessment - Outpatient   Mobility  Status Ambulatory Ambulatory Ambulatory Ambulatory Ambulatory Ambulatory Ambulatory   Number of falls 0 0 0 0 0 0 0   Identified as fall risk False False False False False False False                Assessment and Plan (including Health Maintenance)      Problem List  Smart Sets  Document Outside HM   :    Plan:       ICD-10-CM ICD-9-CM   1. Encounter for annual wellness exam in Medicare patient  Z00.00 V70.0   2. Mixed hyperlipidemia  E78.2 272.2   3. Hypothyroidism, acquired  E03.9 244.9   4. Vitamin D deficiency  E55.9 268.9   5. Abdominal pain, unspecified abdominal location  R10.9 789.00               Health Maintenance Due   Topic Date Due    TETANUS VACCINE  Never done    Shingles Vaccine (1 of 2) Never done    COVID-19 Vaccine (3 - 2024-25 season) 09/01/2024    Mammogram  09/06/2025       Problem List Items Addressed This Visit          Cardiac/Vascular    Hyperlipidemia (Chronic)    Current Assessment & Plan   Lipid levels excellent continue Repatha            Endocrine    Vitamin D deficiency (Chronic)    Current Assessment & Plan   Vitamin-D stable continue treatment         Hypothyroidism, acquired (Chronic)    Current Assessment & Plan   Thyroid level stable continue medication            Other    RESOLVED: Encounter for annual wellness exam in Medicare patient - Primary    Current Assessment & Plan   Discussed results of laboratory examination   She will address her thyroid issues with her functional doctor later today          Other Visit Diagnoses         Abdominal pain, unspecified abdominal location                Health Maintenance Topics with due status: Not Due       Topic Last Completion Date    Influenza Vaccine 09/30/2009    Colorectal Cancer Screening 03/16/2018    DEXA Scan 07/26/2021    Hemoglobin A1c (Diabetic Prevention Screening) 06/02/2023    Lipid Panel 07/16/2025    RSV Vaccine (Age 60+ and Pregnant patients) Not Due       Future Appointments   Date Time Provider Department Center    1/23/2026  8:20 AM Angel Sher MD [facility] [id number] Medicare Annual Wellness and Personalized Prevention Plan:   Fall Risk + Home Safety + Hearing Impairment + Depression Screen + Cognitive Impairment Screen + Health Risk Assessment all reviewed.         Advance Care Planning     Date: 07/21/2025  Patient did not wish or was not able to name a surrogate decision maker or provide an Advance Care Plan.           Opioid Screening: Patient medication list reviewed, patient is not taking prescription opioids. Patient is not using additional opioids than prescribed. Patient is at low risk of substance abuse based on this opioid use history.        Signature:  Angel Sher MD  [facility]  [address] [place] [phone]    Date of encounter: 7/21/25    Follow up in about 6 months (around 1/21/2026). In addition to their scheduled follow up, the patient has also been instructed to follow up on as needed basis.

## 2025-07-21 NOTE — ASSESSMENT & PLAN NOTE
Discussed results of laboratory examination   She will address her thyroid issues with her functional doctor later today

## 2025-08-14 DIAGNOSIS — K21.9 GASTROESOPHAGEAL REFLUX DISEASE, UNSPECIFIED WHETHER ESOPHAGITIS PRESENT: Chronic | ICD-10-CM

## 2025-08-14 DIAGNOSIS — M06.00 SERONEGATIVE RHEUMATOID ARTHRITIS: Chronic | ICD-10-CM

## 2025-08-14 DIAGNOSIS — E55.9 VITAMIN D DEFICIENCY: Chronic | ICD-10-CM

## 2025-08-14 DIAGNOSIS — E03.9 HYPOTHYROIDISM, UNSPECIFIED TYPE: Chronic | ICD-10-CM

## 2025-08-14 RX ORDER — OMEPRAZOLE 40 MG/1
40 CAPSULE, DELAYED RELEASE ORAL EVERY MORNING
Qty: 90 CAPSULE | Refills: 3 | Status: SHIPPED | OUTPATIENT
Start: 2025-08-14

## 2025-08-21 ENCOUNTER — HOSPITAL ENCOUNTER (OUTPATIENT)
Dept: RADIOLOGY | Facility: HOSPITAL | Age: 70
Discharge: HOME | End: 2025-08-21
Payer: MEDICARE

## 2025-08-21 ENCOUNTER — OFFICE VISIT (OUTPATIENT)
Dept: INTERNAL MEDICINE | Facility: CLINIC | Age: 70
End: 2025-08-21
Payer: MEDICARE

## 2025-08-21 VITALS
HEIGHT: 63 IN | DIASTOLIC BLOOD PRESSURE: 78 MMHG | WEIGHT: 145 LBS | BODY MASS INDEX: 25.69 KG/M2 | SYSTOLIC BLOOD PRESSURE: 128 MMHG | HEART RATE: 78 BPM | OXYGEN SATURATION: 98 %

## 2025-08-21 DIAGNOSIS — R06.00 DYSPNEA, UNSPECIFIED TYPE: ICD-10-CM

## 2025-08-21 DIAGNOSIS — R00.2 PALPITATIONS: Primary | ICD-10-CM

## 2025-08-21 DIAGNOSIS — E03.9 HYPOTHYROIDISM, ACQUIRED: Chronic | ICD-10-CM

## 2025-08-21 PROCEDURE — 71046 X-RAY EXAM CHEST 2 VIEWS: CPT | Mod: TC

## 2025-08-21 PROCEDURE — G2211 COMPLEX E/M VISIT ADD ON: HCPCS | Mod: ,,,

## 2025-08-21 PROCEDURE — 99214 OFFICE O/P EST MOD 30 MIN: CPT | Mod: ,,,

## 2025-08-22 ENCOUNTER — PATIENT MESSAGE (OUTPATIENT)
Dept: INTERNAL MEDICINE | Facility: CLINIC | Age: 70
End: 2025-08-22
Payer: MEDICARE

## 2025-08-22 ENCOUNTER — TELEPHONE (OUTPATIENT)
Dept: INTERNAL MEDICINE | Facility: CLINIC | Age: 70
End: 2025-08-22
Payer: MEDICARE

## 2025-08-22 DIAGNOSIS — R06.00 DYSPNEA, UNSPECIFIED TYPE: ICD-10-CM

## 2025-08-22 DIAGNOSIS — R79.89 ELEVATED D-DIMER: ICD-10-CM

## 2025-08-22 DIAGNOSIS — R07.9 CHEST PAIN, UNSPECIFIED TYPE: Primary | ICD-10-CM

## 2025-08-26 ENCOUNTER — TELEPHONE (OUTPATIENT)
Dept: INTERNAL MEDICINE | Facility: CLINIC | Age: 70
End: 2025-08-26
Payer: MEDICARE